# Patient Record
Sex: FEMALE | Race: WHITE | NOT HISPANIC OR LATINO | Employment: FULL TIME | URBAN - METROPOLITAN AREA
[De-identification: names, ages, dates, MRNs, and addresses within clinical notes are randomized per-mention and may not be internally consistent; named-entity substitution may affect disease eponyms.]

---

## 2018-10-10 PROBLEM — J30.89 ENVIRONMENTAL AND SEASONAL ALLERGIES: Status: ACTIVE | Noted: 2018-10-10

## 2018-10-10 PROBLEM — J45.909 ASTHMA: Status: ACTIVE | Noted: 2018-10-10

## 2019-08-07 PROBLEM — F32.9 REACTIVE DEPRESSION: Status: ACTIVE | Noted: 2019-08-07

## 2019-08-07 PROBLEM — I10 ESSENTIAL HYPERTENSION: Status: ACTIVE | Noted: 2019-08-07

## 2019-08-07 PROBLEM — F41.9 ANXIETY: Status: ACTIVE | Noted: 2019-08-07

## 2020-05-05 DIAGNOSIS — Z01.84 ANTIBODY RESPONSE EXAMINATION: ICD-10-CM

## 2020-06-17 PROBLEM — E78.00 ELEVATED CHOLESTEROL: Status: ACTIVE | Noted: 2020-06-17

## 2021-12-07 ENCOUNTER — OFFICE VISIT (OUTPATIENT)
Dept: NEUROLOGY | Facility: CLINIC | Age: 56
End: 2021-12-07
Payer: COMMERCIAL

## 2021-12-07 VITALS
SYSTOLIC BLOOD PRESSURE: 130 MMHG | HEART RATE: 84 BPM | DIASTOLIC BLOOD PRESSURE: 82 MMHG | HEIGHT: 64 IN | WEIGHT: 147.5 LBS | BODY MASS INDEX: 25.18 KG/M2 | RESPIRATION RATE: 16 BRPM

## 2021-12-07 DIAGNOSIS — G43.109 MIGRAINE WITH AURA AND WITHOUT STATUS MIGRAINOSUS, NOT INTRACTABLE: Primary | ICD-10-CM

## 2021-12-07 PROCEDURE — 99999 PR PBB SHADOW E&M-EST. PATIENT-LVL III: ICD-10-PCS | Mod: PBBFAC,,, | Performed by: PSYCHIATRY & NEUROLOGY

## 2021-12-07 PROCEDURE — 99999 PR PBB SHADOW E&M-EST. PATIENT-LVL III: CPT | Mod: PBBFAC,,, | Performed by: PSYCHIATRY & NEUROLOGY

## 2021-12-07 PROCEDURE — 99203 PR OFFICE/OUTPT VISIT, NEW, LEVL III, 30-44 MIN: ICD-10-PCS | Mod: S$GLB,,, | Performed by: PSYCHIATRY & NEUROLOGY

## 2021-12-07 PROCEDURE — 99203 OFFICE O/P NEW LOW 30 MIN: CPT | Mod: S$GLB,,, | Performed by: PSYCHIATRY & NEUROLOGY

## 2023-12-08 ENCOUNTER — HOSPITAL ENCOUNTER (INPATIENT)
Facility: HOSPITAL | Age: 58
LOS: 9 days | Discharge: HOME | DRG: 066 | End: 2023-12-17
Attending: EMERGENCY MEDICINE | Admitting: PSYCHIATRY & NEUROLOGY
Payer: COMMERCIAL

## 2023-12-08 DIAGNOSIS — I61.9 CEREBRAL BRAIN HEMORRHAGE: Primary | ICD-10-CM

## 2023-12-08 DIAGNOSIS — I10 ESSENTIAL HYPERTENSION: ICD-10-CM

## 2023-12-08 DIAGNOSIS — G44.1 OTHER VASCULAR HEADACHE: ICD-10-CM

## 2023-12-08 DIAGNOSIS — I60.9 NONTRAUMATIC SUBARACHNOID HEMORRHAGE: ICD-10-CM

## 2023-12-08 DIAGNOSIS — I60.9 SAH (SUBARACHNOID HEMORRHAGE): ICD-10-CM

## 2023-12-08 LAB
ABO + RH BLD: NORMAL
APTT PPP: 27 SEC (ref 21–32)
BASOPHILS # BLD AUTO: 0.03 K/UL (ref 0–0.2)
BASOPHILS NFR BLD: 0.4 % (ref 0–1.9)
BILIRUB UR QL STRIP: NEGATIVE
BLD GP AB SCN CELLS X3 SERPL QL: NORMAL
CHOLEST SERPL-MCNC: 181 MG/DL (ref 120–199)
CHOLEST/HDLC SERPL: 3.9 {RATIO} (ref 2–5)
CLARITY UR REFRACT.AUTO: CLEAR
COLOR UR AUTO: COLORLESS
DIFFERENTIAL METHOD: ABNORMAL
EOSINOPHIL # BLD AUTO: 0 K/UL (ref 0–0.5)
EOSINOPHIL NFR BLD: 0 % (ref 0–8)
ERYTHROCYTE [DISTWIDTH] IN BLOOD BY AUTOMATED COUNT: 12.8 % (ref 11.5–14.5)
ESTIMATED AVG GLUCOSE: 108 MG/DL (ref 68–131)
GLUCOSE UR QL STRIP: NEGATIVE
HBA1C MFR BLD: 5.4 % (ref 4–5.6)
HCT VFR BLD AUTO: 33.9 % (ref 37–48.5)
HDLC SERPL-MCNC: 46 MG/DL (ref 40–75)
HDLC SERPL: 25.4 % (ref 20–50)
HGB BLD-MCNC: 12.1 G/DL (ref 12–16)
HGB UR QL STRIP: NEGATIVE
IMM GRANULOCYTES # BLD AUTO: 0.02 K/UL (ref 0–0.04)
IMM GRANULOCYTES NFR BLD AUTO: 0.3 % (ref 0–0.5)
INR PPP: 1 (ref 0.8–1.2)
KETONES UR QL STRIP: ABNORMAL
LDLC SERPL CALC-MCNC: 128.4 MG/DL (ref 63–159)
LEUKOCYTE ESTERASE UR QL STRIP: NEGATIVE
LYMPHOCYTES # BLD AUTO: 0.7 K/UL (ref 1–4.8)
LYMPHOCYTES NFR BLD: 9.3 % (ref 18–48)
MCH RBC QN AUTO: 31.1 PG (ref 27–31)
MCHC RBC AUTO-ENTMCNC: 35.7 G/DL (ref 32–36)
MCV RBC AUTO: 87 FL (ref 82–98)
MONOCYTES # BLD AUTO: 0.3 K/UL (ref 0.3–1)
MONOCYTES NFR BLD: 3.8 % (ref 4–15)
NEUTROPHILS # BLD AUTO: 6.3 K/UL (ref 1.8–7.7)
NEUTROPHILS NFR BLD: 86.2 % (ref 38–73)
NITRITE UR QL STRIP: NEGATIVE
NONHDLC SERPL-MCNC: 135 MG/DL
NRBC BLD-RTO: 0 /100 WBC
PH UR STRIP: 8 [PH] (ref 5–8)
PLATELET # BLD AUTO: 191 K/UL (ref 150–450)
PMV BLD AUTO: 8.9 FL (ref 9.2–12.9)
POCT GLUCOSE: 105 MG/DL (ref 70–110)
PROT UR QL STRIP: NEGATIVE
PROTHROMBIN TIME: 10.8 SEC (ref 9–12.5)
RBC # BLD AUTO: 3.89 M/UL (ref 4–5.4)
SP GR UR STRIP: >1.03 (ref 1–1.03)
SPECIMEN OUTDATE: NORMAL
T4 FREE SERPL-MCNC: 0.91 NG/DL (ref 0.71–1.51)
TRIGL SERPL-MCNC: 33 MG/DL (ref 30–150)
TSH SERPL DL<=0.005 MIU/L-ACNC: 0.29 UIU/ML (ref 0.4–4)
URN SPEC COLLECT METH UR: ABNORMAL
WBC # BLD AUTO: 7.35 K/UL (ref 3.9–12.7)

## 2023-12-08 PROCEDURE — 86901 BLOOD TYPING SEROLOGIC RH(D): CPT

## 2023-12-08 PROCEDURE — 63600175 PHARM REV CODE 636 W HCPCS: Performed by: EMERGENCY MEDICINE

## 2023-12-08 PROCEDURE — 80061 LIPID PANEL: CPT

## 2023-12-08 PROCEDURE — 81003 URINALYSIS AUTO W/O SCOPE: CPT

## 2023-12-08 PROCEDURE — 99024 POSTOP FOLLOW-UP VISIT: CPT | Mod: ,,, | Performed by: PHYSICIAN ASSISTANT

## 2023-12-08 PROCEDURE — 85730 THROMBOPLASTIN TIME PARTIAL: CPT | Performed by: PHYSICIAN ASSISTANT

## 2023-12-08 PROCEDURE — 99291 CRITICAL CARE FIRST HOUR: CPT

## 2023-12-08 PROCEDURE — 96365 THER/PROPH/DIAG IV INF INIT: CPT

## 2023-12-08 PROCEDURE — 85610 PROTHROMBIN TIME: CPT | Performed by: PHYSICIAN ASSISTANT

## 2023-12-08 PROCEDURE — 20000000 HC ICU ROOM

## 2023-12-08 PROCEDURE — 99223 PR INITIAL HOSPITAL CARE,LEVL III: ICD-10-PCS | Mod: ,,, | Performed by: PSYCHIATRY & NEUROLOGY

## 2023-12-08 PROCEDURE — 99223 1ST HOSP IP/OBS HIGH 75: CPT | Mod: ,,, | Performed by: PSYCHIATRY & NEUROLOGY

## 2023-12-08 PROCEDURE — 99024 PR POST-OP FOLLOW-UP VISIT: ICD-10-PCS | Mod: ,,, | Performed by: PHYSICIAN ASSISTANT

## 2023-12-08 PROCEDURE — 85025 COMPLETE CBC W/AUTO DIFF WBC: CPT

## 2023-12-08 PROCEDURE — 25000003 PHARM REV CODE 250

## 2023-12-08 PROCEDURE — 94761 N-INVAS EAR/PLS OXIMETRY MLT: CPT

## 2023-12-08 PROCEDURE — 84443 ASSAY THYROID STIM HORMONE: CPT

## 2023-12-08 PROCEDURE — 83036 HEMOGLOBIN GLYCOSYLATED A1C: CPT

## 2023-12-08 PROCEDURE — 84439 ASSAY OF FREE THYROXINE: CPT

## 2023-12-08 PROCEDURE — 25000003 PHARM REV CODE 250: Performed by: PSYCHIATRY & NEUROLOGY

## 2023-12-08 PROCEDURE — 99291 PR CRITICAL CARE, E/M 30-74 MINUTES: ICD-10-PCS | Mod: ,,, | Performed by: PSYCHIATRY & NEUROLOGY

## 2023-12-08 PROCEDURE — 63600175 PHARM REV CODE 636 W HCPCS

## 2023-12-08 PROCEDURE — 99291 CRITICAL CARE FIRST HOUR: CPT | Mod: ,,, | Performed by: PSYCHIATRY & NEUROLOGY

## 2023-12-08 PROCEDURE — 25500020 PHARM REV CODE 255: Performed by: EMERGENCY MEDICINE

## 2023-12-08 RX ORDER — AMLODIPINE BESYLATE 5 MG/1
5 TABLET ORAL DAILY
Status: DISCONTINUED | OUTPATIENT
Start: 2023-12-09 | End: 2023-12-14

## 2023-12-08 RX ORDER — AMLODIPINE BESYLATE 5 MG/1
5 TABLET ORAL DAILY
Status: ON HOLD | COMMUNITY
End: 2023-12-14 | Stop reason: HOSPADM

## 2023-12-08 RX ORDER — AMOXICILLIN 250 MG
1 CAPSULE ORAL 2 TIMES DAILY
Status: DISCONTINUED | OUTPATIENT
Start: 2023-12-08 | End: 2023-12-08

## 2023-12-08 RX ORDER — LANOLIN ALCOHOL/MO/W.PET/CERES
800 CREAM (GRAM) TOPICAL
Status: DISCONTINUED | OUTPATIENT
Start: 2023-12-08 | End: 2023-12-15

## 2023-12-08 RX ORDER — ESTRADIOL 0.1 MG/D
1 PATCH TRANSDERMAL
COMMUNITY
End: 2023-12-08 | Stop reason: CLARIF

## 2023-12-08 RX ORDER — SODIUM,POTASSIUM PHOSPHATES 280-250MG
2 POWDER IN PACKET (EA) ORAL
Status: DISCONTINUED | OUTPATIENT
Start: 2023-12-08 | End: 2023-12-13

## 2023-12-08 RX ORDER — NIMODIPINE 30 MG/1
60 CAPSULE, LIQUID FILLED ORAL EVERY 4 HOURS
Status: DISCONTINUED | OUTPATIENT
Start: 2023-12-08 | End: 2023-12-12

## 2023-12-08 RX ORDER — LABETALOL HCL 20 MG/4 ML
10 SYRINGE (ML) INTRAVENOUS
Status: DISCONTINUED | OUTPATIENT
Start: 2023-12-08 | End: 2023-12-17 | Stop reason: HOSPADM

## 2023-12-08 RX ORDER — MAGNESIUM SULFATE HEPTAHYDRATE 40 MG/ML
2 INJECTION, SOLUTION INTRAVENOUS ONCE
Status: COMPLETED | OUTPATIENT
Start: 2023-12-08 | End: 2023-12-09

## 2023-12-08 RX ORDER — GABAPENTIN 300 MG/1
300 CAPSULE ORAL 3 TIMES DAILY
Status: DISCONTINUED | OUTPATIENT
Start: 2023-12-09 | End: 2023-12-15

## 2023-12-08 RX ORDER — OXYCODONE HYDROCHLORIDE 5 MG/1
5 TABLET ORAL EVERY 4 HOURS PRN
Status: DISCONTINUED | OUTPATIENT
Start: 2023-12-08 | End: 2023-12-17 | Stop reason: HOSPADM

## 2023-12-08 RX ORDER — NICARDIPINE HYDROCHLORIDE 0.2 MG/ML
0-15 INJECTION INTRAVENOUS CONTINUOUS
Status: DISCONTINUED | OUTPATIENT
Start: 2023-12-08 | End: 2023-12-11

## 2023-12-08 RX ORDER — NICARDIPINE HYDROCHLORIDE 0.2 MG/ML
INJECTION INTRAVENOUS
Status: COMPLETED
Start: 2023-12-08 | End: 2023-12-08

## 2023-12-08 RX ORDER — ACETAMINOPHEN 325 MG/1
650 TABLET ORAL EVERY 4 HOURS PRN
Status: DISCONTINUED | OUTPATIENT
Start: 2023-12-08 | End: 2023-12-14

## 2023-12-08 RX ORDER — METOCLOPRAMIDE HYDROCHLORIDE 5 MG/ML
10 INJECTION INTRAMUSCULAR; INTRAVENOUS ONCE
Status: COMPLETED | OUTPATIENT
Start: 2023-12-08 | End: 2023-12-08

## 2023-12-08 RX ORDER — ONDANSETRON 8 MG/1
8 TABLET, ORALLY DISINTEGRATING ORAL EVERY 8 HOURS PRN
Status: DISCONTINUED | OUTPATIENT
Start: 2023-12-08 | End: 2023-12-17 | Stop reason: HOSPADM

## 2023-12-08 RX ORDER — SODIUM CHLORIDE 0.9 % (FLUSH) 0.9 %
10 SYRINGE (ML) INJECTION
Status: DISCONTINUED | OUTPATIENT
Start: 2023-12-08 | End: 2023-12-08

## 2023-12-08 RX ORDER — METHOCARBAMOL 500 MG/1
500 TABLET, FILM COATED ORAL 4 TIMES DAILY
Status: DISCONTINUED | OUTPATIENT
Start: 2023-12-09 | End: 2023-12-15

## 2023-12-08 RX ORDER — PROGESTERONE 200 MG/1
200 CAPSULE ORAL DAILY
COMMUNITY
End: 2023-12-08 | Stop reason: CLARIF

## 2023-12-08 RX ORDER — TALC
6 POWDER (GRAM) TOPICAL NIGHTLY PRN
Status: DISCONTINUED | OUTPATIENT
Start: 2023-12-08 | End: 2023-12-17 | Stop reason: HOSPADM

## 2023-12-08 RX ORDER — LEVETIRACETAM 500 MG/1
500 TABLET ORAL 2 TIMES DAILY
Status: DISCONTINUED | OUTPATIENT
Start: 2023-12-08 | End: 2023-12-12

## 2023-12-08 RX ORDER — HYDROMORPHONE HYDROCHLORIDE 1 MG/ML
0.5 INJECTION, SOLUTION INTRAMUSCULAR; INTRAVENOUS; SUBCUTANEOUS
Status: COMPLETED | OUTPATIENT
Start: 2023-12-08 | End: 2023-12-08

## 2023-12-08 RX ADMIN — SENNOSIDES AND DOCUSATE SODIUM 1 TABLET: 8.6; 5 TABLET ORAL at 09:12

## 2023-12-08 RX ADMIN — NIMODIPINE 60 MG: 30 CAPSULE, LIQUID FILLED ORAL at 06:12

## 2023-12-08 RX ADMIN — NIMODIPINE 60 MG: 30 CAPSULE, LIQUID FILLED ORAL at 02:12

## 2023-12-08 RX ADMIN — METOCLOPRAMIDE 10 MG: 5 INJECTION, SOLUTION INTRAMUSCULAR; INTRAVENOUS at 11:12

## 2023-12-08 RX ADMIN — LEVETIRACETAM 500 MG: 500 TABLET, FILM COATED ORAL at 09:12

## 2023-12-08 RX ADMIN — IOHEXOL 75 ML: 350 INJECTION, SOLUTION INTRAVENOUS at 11:12

## 2023-12-08 RX ADMIN — HYDROMORPHONE HYDROCHLORIDE 0.5 MG: 1 INJECTION, SOLUTION INTRAMUSCULAR; INTRAVENOUS; SUBCUTANEOUS at 12:12

## 2023-12-08 RX ADMIN — NIMODIPINE 60 MG: 30 CAPSULE, LIQUID FILLED ORAL at 09:12

## 2023-12-08 RX ADMIN — OXYCODONE HYDROCHLORIDE 5 MG: 5 TABLET ORAL at 07:12

## 2023-12-08 RX ADMIN — NICARDIPINE HYDROCHLORIDE 5 MG/HR: 0.2 INJECTION, SOLUTION INTRAVENOUS at 11:12

## 2023-12-08 RX ADMIN — NICARDIPINE HYDROCHLORIDE 5 MG/HR: 0.2 INJECTION INTRAVENOUS at 11:12

## 2023-12-08 NOTE — H&P
[facility] - Emergency Dept  Neurocritical Care  History & Physical    Admit Date: 12/8/2023  Service Date: 12/08/2023  Length of Stay: 0    Subjective:     Chief Complaint: Subarachnoid hemorrhage    History of Present Illness: 59 y/o F w/ PMH migraines, anxiety, and HTN who [place] [facility] [place]. Patient states that she was working out this morning when she developed a sudden, severe headache, described as among the worst of her life, which prompted her to come to the [facility] [place]. According to patient, the headache's improved considerably after she took Excedrin and reduced her activity; if she sits with her eyes closed the pain is 2/10 in intensity and increases to 9/10 with movement. Upon [place] [facility] patient's BP was measured into the 200s systolics. The patient states that she had a BP into 180s at her last visit with her general practitioner but was not prescribed medication because her pressures were in normal at home. 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 The patient will be admitted to [facility] for further neuro-monitoring and higher level of care.      Past Medical History:   Diagnosis Date    Asthma     Lung collapse     Pneumothorax     spontaneous     Past Surgical History:   Procedure Laterality Date    APPENDECTOMY      bilateral groin hernia repair        Current Facility-Administered Medications on File Prior to Encounter   Medication Dose Route Frequency Provider Last Rate Last Admin    [COMPLETED] niCARdipine 40 mg/200 mL (0.2 mg/mL) infusion  0-15 mg/hr Intravenous ED 1 Time Jose Doss MD 12.5 mL/hr at 12/08/23 0940 2.5 mg/hr at 12/08/23 0940    [COMPLETED] ondansetron injection 4 mg  4 mg Intravenous ED 1 Time Jose Doss MD   4 mg at 12/08/23 0937    [DISCONTINUED] niCARdipine 40 mg/200 mL (0.2 mg/mL) infusion  0-15 mg/hr  Intravenous Continuous Jose Doss MD        [DISCONTINUED] ondansetron 4 mg/2 mL injection              Current Outpatient Medications on File Prior to Encounter   Medication Sig Dispense Refill    NON FORMULARY MEDICATION Bi-Est (50/50)/Prog/Test 4mg/100mg/1mg/ml Cream - APPLY 4 CLICKS TOPICALLY EVERY DAY AT BEDTIME AS DIRECTED      [DISCONTINUED] acyclovir (ZOVIRAX) 400 MG tablet Take 1 tablet (400 mg total) by mouth 3 (three) times daily. 271 tablet 0    [DISCONTINUED] APPLE CIDER VINEGAR ORAL Take by mouth.      [DISCONTINUED] cetirizine (ZYRTEC) 10 MG tablet Take 10 mg by mouth once daily.      [DISCONTINUED] dihydroergotamine (MIGRANAL) 0.5 mg/pump act. (4 mg/mL) nasal spray 1 spray by Nasal route as needed for Migraine. Use in one nostril as directed.  No more than 4 sprays in one hour 1 mL 0    [DISCONTINUED] fluticasone (FLONASE) 50 mcg/actuation nasal spray 1 spray by Each Nare route once daily.      [DISCONTINUED] multivit-min/ferrous fumarate (MULTI VITAMIN ORAL) Take by mouth.      [DISCONTINUED] topiramate (TOPAMAX) 50 MG tablet TAKE 1 TABLET BY MOUTH TWICE A  tablet 3      Allergies: Erythromycin and Pcn [penicillins]    N/A  Family History   Problem Relation Age of Onset    Cataracts Mother     Diabetes Mother     Hypertension Mother     Progressive Supranuclear Palsy Mother     Stroke Mother     Pulmonary embolism Mother     Cataracts Father     Heart disease Father     Hypertension Father     Asthma Father     Diabetes Father     Bipolar disorder Sister     Chronic fatigue Sister     Hyperlipidemia Sister      Social History     Tobacco Use    Smoking status: Never    Smokeless tobacco: Never   Substance Use Topics    Alcohol use: No    Drug use: No     Review of Systems   Constitutional:  Negative for fatigue and fever.   HENT: Negative.     Eyes:  Positive for photophobia.   Respiratory: Negative.     Cardiovascular:  Negative for chest pain and palpitations.   Gastrointestinal:  Negative.    Endocrine: Negative.    Genitourinary: Negative.    Musculoskeletal: Negative.  Negative for neck stiffness.   Skin: Negative.    Neurological:  Positive for headaches. Negative for dizziness, seizures, syncope, facial asymmetry, speech difficulty, weakness, light-headedness and numbness.   Psychiatric/Behavioral: Negative.       Objective:     Vitals:    Temp: 97.9 °F (36.6 °C)  Pulse: 96  BP: (!) 140/63  MAP (mmHg): 91  Resp: 12  SpO2: 96 %    Temp  Min: 97.9 °F (36.6 °C)  Max: 98.1 °F (36.7 °C)  Pulse  Min: 57  Max: 106  BP  Min: 105/58  Max: 210/105  MAP (mmHg)  Min: 77  Max: 150  Resp  Min: 10  Max: 20  SpO2  Min: 93 %  Max: 100 %    No intake/output data recorded.            Physical Exam  Constitutional:       Appearance: She is not toxic-appearing.   HENT:      Head: Normocephalic.      Nose: Nose normal.      Mouth/Throat:      Mouth: Mucous membranes are moist.   Eyes:      Pupils: Pupils are equal, round, and reactive to light.   Cardiovascular:      Rate and Rhythm: Regular rhythm. Tachycardia present.      Heart sounds: No murmur heard.  Pulmonary:      Effort: Pulmonary effort is normal.      Breath sounds: No stridor. No wheezing.   Abdominal:      General: Abdomen is flat. There is no distension.      Palpations: Abdomen is soft.      Tenderness: There is no abdominal tenderness.   Musculoskeletal:         General: Normal range of motion.      Cervical back: Normal range of motion.      Right lower leg: No edema.      Left lower leg: No edema.   Skin:     General: Skin is warm.   Neurological:      Mental Status: She is alert.      Comments: GCS 15  PEERL  5/5 BL UE and LE  EOMI  Sensation in tact  Cranial nerves in tact   Psychiatric:         Mood and Affect: Mood normal.         Behavior: Behavior normal.         Thought Content: Thought content normal.         Judgment: Judgment normal.              Today I personally reviewed pertinent medications, lines/drains/airways, imaging,  cardiology results, laboratory results, microbiology results,         Assessment/Plan:     Neuro  * Subarachnoid hemorrhage  59 y/o F w/ PMH migraines, anxiety, and HTN who presented to [facility] with SAH. Patient states that she was working out this morning when she developed a sudden, severe headache, described as among the worst of her life, which prompted her to come to the [facility] ED. According to patient, the headache's improved considerably after she took Excedrin and reduced her activity; if she sits with her eyes closed the pain is 2/10 in intensity and increases to 9/10 with movement. Upon presentation to [facility], patient's BP was measured into the 200s systolics. The patient states that she had a BP into 180s at her last visit with her general practitioner but was not prescribed medication because her pressures were in normal at home. 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 The patient will be admitted to [facility] for further neuro-monitoring and higher level of care.    Plan:  -Admit to [facility]  [facility]  -SBP <140  -Daily TCDs  -Follow up CT 4PM today  -Daily CBC, CMP, Mg, Phos, Lipids  -Nimodipine 60 mg q4hrs   -Keppra 500 mg BID 7 days  -Q1 neurochecks   -Pain control  -Consider echocardiogram  -Possible cerebral angiogram w/ IR per NSGY    Cardiac/Vascular  Elevated cholesterol  Hx of elevated lipoproteins   Will get new lipid panel     Essential hypertension  Previously on Losartan-HCTZ for HTN. Has not been on medication for extended period of time due to reported normal home pressures.  Will keep SBP <140 post SAH. Currently on Cardene drip. Will add Labetolol and Hydralazine PRNs.           The patient is being Prophylaxed for:  Venous Thromboembolism with: None  Stress Ulcer with: None  Ventilator Pneumonia with: not applicable    N/A  Family  History   Problem Relation Age of Onset    Cataracts Mother     Diabetes Mother     Hypertension Mother     Progressive Supranuclear Palsy Mother     Stroke Mother     Pulmonary embolism Mother     Cataracts Father     Heart disease Father     Hypertension Father     Asthma Father     Diabetes Father     Bipolar disorder Sister     Chronic fatigue Sister     Hyperlipidemia Sister        Activity Orders            Progressive Mobility Protocol (mobilize patient to their highest level of functioning at least twice daily) starting at 12/08 2000    Turn patient starting at 12/08 1400    Elevate HOB starting at 12/08 1324    Diet NPO: NPO starting at 12/08 1324          Full Code    Car Valentine MD  Neurocritical Care  [facility] - Emergency Dept

## 2023-12-08 NOTE — HPI
"Laura Newell is a 58 y.o. female with a significant medical history of asthma who presents to the hospital as a transfer from [facility] for evaluation of SAH.  The patient was in her usual state of health and at the gym.  She was lifting weights when she had acute onset headache.  She took Excedrin Migraine with some relief but presented to the [facility] ED about an hour after the onset of the HA.  On arrival to the OSH ED the patient's BP was 200s/100s. She denied history of HTN.  A CTH was obtained and revealed a SAH.  The patient was transferred to [facility] for higher level of care.    Currently the patient has a HA and photophobia.  She states "keeping still and not talking or moving much" helps decrease the HA though it remains.  She also states it improves w/her eyes closed.      "

## 2023-12-08 NOTE — SUBJECTIVE & OBJECTIVE
Past Medical History:   Diagnosis Date    Asthma     Lung collapse     Pneumothorax     spontaneous     Past Surgical History:   Procedure Laterality Date    APPENDECTOMY      bilateral groin hernia repair       Family History   Problem Relation Age of Onset    Cataracts Mother     Diabetes Mother     Hypertension Mother     Progressive Supranuclear Palsy Mother     Stroke Mother     Pulmonary embolism Mother     Cataracts Father     Heart disease Father     Hypertension Father     Asthma Father     Diabetes Father     Bipolar disorder Sister     Chronic fatigue Sister     Hyperlipidemia Sister      Social History     Tobacco Use    Smoking status: Never    Smokeless tobacco: Never   Substance Use Topics    Alcohol use: No    Drug use: No     Review of patient's allergies indicates:   Allergen Reactions    Erythromycin     Pcn [penicillins]        Medications: I have reviewed the current medication administration record.    Current Outpatient Medications   Medication Instructions    NON FORMULARY MEDICATION Bi-Est (50/50)/Prog/Test 4mg/100mg/1mg/ml Cream - APPLY 4 CLICKS TOPICALLY EVERY DAY AT BEDTIME AS DIRECTED       Review of Systems   Eyes:  Positive for photophobia. Negative for visual disturbance.   Neurological:  Positive for headaches. Negative for facial asymmetry, speech difficulty, weakness and numbness.     Objective:     Vital Signs (Most Recent):  Temp: 97.9 °F (36.6 °C) (12/08/23 1157)  Pulse: 106 (12/08/23 1215)  Resp: 20 (12/08/23 1244)  BP: (!) 178/84 (12/08/23 1231)  SpO2: 100 % (12/08/23 1231)    Vital Signs Range (Last 24H):  Temp:  [97.9 °F (36.6 °C)-98.1 °F (36.7 °C)]   Pulse:  []   Resp:  [10-20]   BP: (105-210)/()   SpO2:  [93 %-100 %]        Physical Exam  Vitals and nursing note reviewed.   Constitutional:       Appearance: She is ill-appearing.   HENT:      Mouth/Throat:      Mouth: Mucous membranes are moist.   Eyes:      General: No scleral icterus.        Right eye:  No discharge.         Left eye: No discharge.      Extraocular Movements: Extraocular movements intact.      Conjunctiva/sclera: Conjunctivae normal.   Cardiovascular:      Rate and Rhythm: Normal rate.   Pulmonary:      Effort: Pulmonary effort is normal. No respiratory distress.   Abdominal:      General: There is no distension.   Musculoskeletal:      Cervical back: Normal range of motion and neck supple.      Right lower leg: No edema.      Left lower leg: No edema.   Skin:     General: Skin is warm and dry.   Neurological:      Mental Status: She is alert and oriented to person, place, and time.      Sensory: No sensory deficit.      Motor: No weakness.   Psychiatric:         Mood and Affect: Mood normal.         Behavior: Behavior normal.              Neurological Exam:   LOC: alert  Attention Span: Good   Language: No aphasia  Articulation: No dysarthria  Orientation: Person, Place, Time   EOM (CN III, IV, VI): Full/intact  Facial Sensation (CN V): Normal  Facial Movement (CN VII): Symmetric facial expression    Motor: Arm left  Normal 5/5  Leg left  Normal 5/5  Arm right  Normal 5/5  Leg right Normal 5/5  Sensation: intact to light touch  Tone: Normal tone throughout      Laboratory:  CMP:   Recent Labs   Lab 12/08/23  0826   CALCIUM 9.4   ALBUMIN 4.7   PROT 7.6      K 4.3   CO2 25      BUN 15   CREATININE 0.88   ALKPHOS 97   ALT 23   AST 33   BILITOT 0.9     CBC:   Recent Labs   Lab 12/08/23  0826   WBC 4.80   RBC 4.44   HGB 13.8   HCT 41.0      MCV 92   MCH 31.1*   MCHC 33.7       Diagnostic Results:      Brain imaging:  CT pending    [facility] 12/8/2023 @0851 Impression: Small hemorrhage along the anterior and right lateral aspects of the katiuska, 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  Patchy hypodense areas involving the bilateral subcortical cerebral white matter, nonspecific and possibly reflecting chronic small vessel ischemic change.    Vessel Imaging:  CTA Head and Neck  12/8/2023 Impression: Stable subarachnoid hemorrhage.  No hydrocephalus.  Nonspecific presumed chronic white matter changes again identified.  No intracranial aneurysm or AVM is identified.    Cardiac Evaluation:   EKG 12/8/2023 NSR possible LAE

## 2023-12-08 NOTE — SUBJECTIVE & OBJECTIVE
Past Medical History:   Diagnosis Date    Asthma     Lung collapse     Pneumothorax     spontaneous     Past Surgical History:   Procedure Laterality Date    APPENDECTOMY      bilateral groin hernia repair        Current Facility-Administered Medications on File Prior to Encounter   Medication Dose Route Frequency Provider Last Rate Last Admin    [COMPLETED] niCARdipine 40 mg/200 mL (0.2 mg/mL) infusion  0-15 mg/hr Intravenous ED 1 Time Jose Doss MD 12.5 mL/hr at 12/08/23 0940 2.5 mg/hr at 12/08/23 0940    [COMPLETED] ondansetron injection 4 mg  4 mg Intravenous ED 1 Time Jose Doss MD   4 mg at 12/08/23 0937    [DISCONTINUED] niCARdipine 40 mg/200 mL (0.2 mg/mL) infusion  0-15 mg/hr Intravenous Continuous Jose Doss MD        [DISCONTINUED] ondansetron 4 mg/2 mL injection              Current Outpatient Medications on File Prior to Encounter   Medication Sig Dispense Refill    NON FORMULARY MEDICATION Bi-Est (50/50)/Prog/Test 4mg/100mg/1mg/ml Cream - APPLY 4 CLICKS TOPICALLY EVERY DAY AT BEDTIME AS DIRECTED      [DISCONTINUED] acyclovir (ZOVIRAX) 400 MG tablet Take 1 tablet (400 mg total) by mouth 3 (three) times daily. 271 tablet 0    [DISCONTINUED] APPLE CIDER VINEGAR ORAL Take by mouth.      [DISCONTINUED] cetirizine (ZYRTEC) 10 MG tablet Take 10 mg by mouth once daily.      [DISCONTINUED] dihydroergotamine (MIGRANAL) 0.5 mg/pump act. (4 mg/mL) nasal spray 1 spray by Nasal route as needed for Migraine. Use in one nostril as directed.  No more than 4 sprays in one hour 1 mL 0    [DISCONTINUED] fluticasone (FLONASE) 50 mcg/actuation nasal spray 1 spray by Each Nare route once daily.      [DISCONTINUED] multivit-min/ferrous fumarate (MULTI VITAMIN ORAL) Take by mouth.      [DISCONTINUED] topiramate (TOPAMAX) 50 MG tablet TAKE 1 TABLET BY MOUTH TWICE A  tablet 3      Allergies: Erythromycin and Pcn [penicillins]    N/A  Family History   Problem Relation Age of Onset    Cataracts  Mother     Diabetes Mother     Hypertension Mother     Progressive Supranuclear Palsy Mother     Stroke Mother     Pulmonary embolism Mother     Cataracts Father     Heart disease Father     Hypertension Father     Asthma Father     Diabetes Father     Bipolar disorder Sister     Chronic fatigue Sister     Hyperlipidemia Sister      Social History     Tobacco Use    Smoking status: Never    Smokeless tobacco: Never   Substance Use Topics    Alcohol use: No    Drug use: No     Review of Systems   Constitutional:  Negative for fatigue and fever.   HENT: Negative.     Eyes:  Positive for photophobia.   Respiratory: Negative.     Cardiovascular:  Negative for chest pain and palpitations.   Gastrointestinal: Negative.    Endocrine: Negative.    Genitourinary: Negative.    Musculoskeletal: Negative.  Negative for neck stiffness.   Skin: Negative.    Neurological:  Positive for headaches. Negative for dizziness, seizures, syncope, facial asymmetry, speech difficulty, weakness, light-headedness and numbness.   Psychiatric/Behavioral: Negative.       Objective:     Vitals:    Temp: 97.9 °F (36.6 °C)  Pulse: 96  BP: (!) 140/63  MAP (mmHg): 91  Resp: 12  SpO2: 96 %    Temp  Min: 97.9 °F (36.6 °C)  Max: 98.1 °F (36.7 °C)  Pulse  Min: 57  Max: 106  BP  Min: 105/58  Max: 210/105  MAP (mmHg)  Min: 77  Max: 150  Resp  Min: 10  Max: 20  SpO2  Min: 93 %  Max: 100 %    No intake/output data recorded.            Physical Exam  Constitutional:       Appearance: She is not toxic-appearing.   HENT:      Head: Normocephalic.      Nose: Nose normal.      Mouth/Throat:      Mouth: Mucous membranes are moist.   Eyes:      Pupils: Pupils are equal, round, and reactive to light.   Cardiovascular:      Rate and Rhythm: Regular rhythm. Tachycardia present.      Heart sounds: No murmur heard.  Pulmonary:      Effort: Pulmonary effort is normal.      Breath sounds: No stridor. No wheezing.   Abdominal:      General: Abdomen is flat. There is no  distension.      Palpations: Abdomen is soft.      Tenderness: There is no abdominal tenderness.   Musculoskeletal:         General: Normal range of motion.      Cervical back: Normal range of motion.      Right lower leg: No edema.      Left lower leg: No edema.   Skin:     General: Skin is warm.   Neurological:      Mental Status: She is alert.      Comments: GCS 15  PEERL  5/5 BL UE and LE  EOMI  Sensation in tact  Cranial nerves in tact   Psychiatric:         Mood and Affect: Mood normal.         Behavior: Behavior normal.         Thought Content: Thought content normal.         Judgment: Judgment normal.              Today I personally reviewed pertinent medications, lines/drains/airways, imaging, cardiology results, laboratory results, microbiology results,

## 2023-12-08 NOTE — HPI
59 yo female with history of HTN transferred from [facility] for NSGY evaluation of subarachnoid hemorrhage. She presented to the emergency room with complaint of acute onset headache while working out this am. States that she took Excedrin migraine which helped a little bit.  States that she has never had a headache like this before.  Patient's systolic blood pressures greater than 200 upon arrival.  CTH at OSH shows small hemorrhage along the anterior and right lateral aspects of the katiuska, no IVH. Pt is not on blood thinners.

## 2023-12-08 NOTE — CONSULTS
[facility] - Emergency Dept  Vascular Neurology  [facility]  Consult Note    Inpatient consult to Vascular Neurology  Consult performed by: Pricila Ramírez DNP, NP  Consult ordered by: Amol Chahal MD  Reason for consult: transfer, SAH      Inpatient consult to Vascular (Stroke) Neurology  Consult performed by: Pricila Ramírez DNP, NP  Consult ordered by: Car Singh MD        Assessment/Plan:     Essential hypertension  -Stroke risk factor.  Noted in the patient's record as of 8/2019.  Patient not currently prescribed medications.  -Initial SBP 200s.  -Currently on Cardene gtt  -Monitor for need to d/c on home BP meds    Subarachnoid hemorrhage  Laura Newell is a 58 y.o. female with a significant medical history of asthma who presents to the hospital as a [place] evaluation of SAH.  She was hypertensive on presentation to the OSH ED, 200s/100s.  A CTH showed SAH.  Cardene gtt was started.  The patient was transferred for higher level of care.      Antithrombotics for secondary stroke prevention: Antiplatelets: None: Intracerebral Hemorrhage    Statins for secondary stroke prevention and hyperlipidemia, if present:   Statins: None: Reason: Not indicated in acute SAH though if LDL is >70 consider starting    Aggressive risk factor modification: None     Rehab efforts: The patient has been evaluated by a stroke team provider and the therapy needs have been fully considered based off the presenting complaints and exam findings. The following therapy evaluations are needed: PT evaluate and treat, OT evaluate and treat    Diagnostics ordered/pending: CT scan of head without contrast to asses brain parenchyma, HgbA1C to assess blood glucose levels, Lipid Profile to assess cholesterol levels, TTE to assess cardiac function/status , TSH to assess thyroid function, Other: DSA    VTE prophylaxis: Mechanical prophylaxis: Place SCDs  None: Reason for No  Pharmacological VTE Prophylaxis: SAH    BP parameters: SAH: SBP<140        Elevated cholesterol  -Stroke risk factor.  Noted in the patient's record as of 6/2020.  Patient not currently prescribed statin.  -Lipid panel is pending        STROKE DOCUMENTATION          NIH Scale:  Interval: baseline  1a. Level of Consciousness: 0-->Alert, keenly responsive  1b. LOC Questions: 0-->Answers both questions correctly  1c. LOC Commands: 0-->Performs both tasks correctly  2. Best Gaze: 0-->Normal  3. Visual: 0-->No visual loss  4. Facial Palsy: 0-->Normal symmetrical movements  5a. Motor Arm, Left: 0-->No drift, limb holds 90 (or 45) degrees for full 10 secs  5b. Motor Arm, Right: 0-->No drift, limb holds 90 (or 45) degrees for full 10 secs  6a. Motor Leg, Left: 0-->No drift, leg holds 30 degree position for full 5 secs  6b. Motor Leg, Right: 0-->No drift, leg holds 30 degree position for full 5 secs  7. Limb Ataxia: 0-->Absent  8. Sensory: 0-->Normal, no sensory loss  9. Best Language: 0-->No aphasia, normal  10. Dysarthria: 0-->Normal  11. Extinction and Inattention (formerly Neglect): 0-->No abnormality  Total (NIH Stroke Scale): 0    Modified Huerfano Score: 0  Zaleski Coma Scale:15   ABCD2 Score:    [id number] Score:   HAS -BLED Score:   ICH Score:0  Hunt & Bravo Classification:Grade I      Thrombolysis Candidate? No, CT findings (ICH, SAH, Large core infarct)     Delays to Thrombolysis?  Not Applicable    Interventional Revascularization Candidate?   Is the patient eligible for mechanical endovascular reperfusion (SHAWN)?   No, SAH    Delays to Thrombectomy? Not Applicable    Hemorrhagic change of an Ischemic Stroke: Does this patient have an ischemic stroke with hemorrhagic changes? No     Subjective:     History of Present Illness:  Laura Newell is a 58 y.o. female with a significant medical history of asthma who presents to the hospital as a transfer from [facility] for evaluation of SAH.  The  "patient was in her usual state of health and at the gym.  She was lifting weights when she had acute onset headache.  She took Excedrin Migraine with some relief but presented to the OSH ED about an hour after the onset of the HA.  On arrival to the OSH ED the patient's BP was 200s/100s. She denied history of HTN.  A CTH was obtained and revealed a SAH.  The patient was transferred to [facility] for higher level of care.    Currently the patient has a HA and photophobia.  She states "keeping still and not talking or moving much" helps decrease the HA though it remains.  She also states it improves w/her eyes closed.              Past Medical History:   Diagnosis Date    Asthma     Lung collapse     Pneumothorax     spontaneous     Past Surgical History:   Procedure Laterality Date    APPENDECTOMY      bilateral groin hernia repair       Family History   Problem Relation Age of Onset    Cataracts Mother     Diabetes Mother     Hypertension Mother     Progressive Supranuclear Palsy Mother     Stroke Mother     Pulmonary embolism Mother     Cataracts Father     Heart disease Father     Hypertension Father     Asthma Father     Diabetes Father     Bipolar disorder Sister     Chronic fatigue Sister     Hyperlipidemia Sister      Social History     Tobacco Use    Smoking status: Never    Smokeless tobacco: Never   Substance Use Topics    Alcohol use: No    Drug use: No     Review of patient's allergies indicates:   Allergen Reactions    Erythromycin     Pcn [penicillins]        Medications: I have reviewed the current medication administration record.    Current Outpatient Medications   Medication Instructions    NON FORMULARY MEDICATION Bi-Est (50/50)/Prog/Test 4mg/100mg/1mg/ml Cream - APPLY 4 CLICKS TOPICALLY EVERY DAY AT BEDTIME AS DIRECTED       Review of Systems   Eyes:  Positive for photophobia. Negative for visual disturbance.   Neurological:  Positive for headaches. Negative for facial asymmetry, speech " difficulty, weakness and numbness.     Objective:     Vital Signs (Most Recent):  Temp: 97.9 °F (36.6 °C) (12/08/23 1157)  Pulse: 106 (12/08/23 1215)  Resp: 20 (12/08/23 1244)  BP: (!) 178/84 (12/08/23 1231)  SpO2: 100 % (12/08/23 1231)    Vital Signs Range (Last 24H):  Temp:  [97.9 °F (36.6 °C)-98.1 °F (36.7 °C)]   Pulse:  []   Resp:  [10-20]   BP: (105-210)/()   SpO2:  [93 %-100 %]        Physical Exam  Vitals and nursing note reviewed.   Constitutional:       Appearance: She is ill-appearing.   HENT:      Mouth/Throat:      Mouth: Mucous membranes are moist.   Eyes:      General: No scleral icterus.        Right eye: No discharge.         Left eye: No discharge.      Extraocular Movements: Extraocular movements intact.      Conjunctiva/sclera: Conjunctivae normal.   Cardiovascular:      Rate and Rhythm: Normal rate.   Pulmonary:      Effort: Pulmonary effort is normal. No respiratory distress.   Abdominal:      General: There is no distension.   Musculoskeletal:      Cervical back: Normal range of motion and neck supple.      Right lower leg: No edema.      Left lower leg: No edema.   Skin:     General: Skin is warm and dry.   Neurological:      Mental Status: She is alert and oriented to person, place, and time.      Sensory: No sensory deficit.      Motor: No weakness.   Psychiatric:         Mood and Affect: Mood normal.         Behavior: Behavior normal.              Neurological Exam:   LOC: alert  Attention Span: Good   Language: No aphasia  Articulation: No dysarthria  Orientation: Person, Place, Time   EOM (CN III, IV, VI): Full/intact  Facial Sensation (CN V): Normal  Facial Movement (CN VII): Symmetric facial expression    Motor: Arm left  Normal 5/5  Leg left  Normal 5/5  Arm right  Normal 5/5  Leg right Normal 5/5  Sensation: intact to light touch  Tone: Normal tone throughout      Laboratory:  CMP:   Recent Labs   Lab 12/08/23  0826   CALCIUM 9.4   ALBUMIN 4.7   PROT 7.6      K 4.3    CO2 25      BUN 15   CREATININE 0.88   ALKPHOS 97   ALT 23   AST 33   BILITOT 0.9     CBC:   Recent Labs   Lab 12/08/23  0826   WBC 4.80   RBC 4.44   HGB 13.8   HCT 41.0      MCV 92   MCH 31.1*   MCHC 33.7       Diagnostic Results:      Brain imaging:  CTH pending    CTH 12/8/2023 @0851 Impression: Small hemorrhage along the anterior and right lateral aspects of the katiuska, 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  Patchy hypodense areas involving the bilateral subcortical cerebral white matter, nonspecific and possibly reflecting chronic small vessel ischemic change.    Vessel Imaging:  CTA Head and Neck 12/8/2023 Impression: Stable subarachnoid hemorrhage.  No hydrocephalus.  Nonspecific presumed chronic white matter changes again identified.  No intracranial aneurysm or AVM is identified.    Cardiac Evaluation:   EKG 12/8/2023 NSR possible AIDA Ramírez, LENCHO, NP  [facility]  Department of Vascular Neurology   [facility] - Emergency Dept

## 2023-12-08 NOTE — HPI
57 y/o F w/ PMH migraines, anxiety, and HTN who presented to [facility] with SAH. Patient states that she was working out this morning when she developed a sudden, severe headache, described as among the worst of her life, which prompted her to come to the [facility] ED. According to patient, the headache's improved considerably after she took Excedrin and reduced her activity; if she sits with her eyes closed the pain is 2/10 in intensity and increases to 9/10 with movement. Upon presentation to [facility], patient's BP was measured into the 200s systolics. The patient states that she had a BP into 180s at her last visit with her general practitioner but was not prescribed medication because her pressures were in normal at home. 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 The patient will be admitted to [facility] for further neuro-monitoring and higher level of care.

## 2023-12-08 NOTE — ED NOTES
"C/C: 58 y.o. female arrived to ED via EMS transfer from [place] for NSGY consult. Patient dx'd with "ruptured aneurysm at the katiuska" upon working out this morning. Patient endorses 5/10 HA when speaking or stimulated, but otherwise feels asymptomatic at rest. Denies numbness/tingling, unilateral weakness, N/V, or visual changes.     APPEARANCE: awake, alert, and appears to be in mild discomfort. Pain score 5/10. Pt placed on cont cardiac monitoring, auto BP cuff, and cont pulse ox. Bed in lowest and locked position with side rails up x2.    SKIN: warm, dry and intact. No visible breakdown or bruising noted to extremities.   MUSCULOSKELETAL: Head normocephalic, atraumatic. Patient moving all extremities spontaneously, no obvious swelling or deformities noted. Ambulates independently.  RESPIRATORY: Airway open and patent. Denies shortness of breath. Respirations even, unlabored, equal bilaterally on inspiration and expiration.   CARDIAC: Regular HR. Denies CP, 2+ DP pulses bilaterally; no peripheral edema.  ABDOMEN: S/ND/NT, Denies N/V/D. Pt denies abnormal BM.   : Pt voids spontaneously, denies dysuria, hematuria, urinary frequency, or incontinence  NEUROLOGIC: AAO x 4; speaking and following commands appropriately. Equal strength in all extremities; denies numbness/tingling. +bifocal use. No dysarthria or aphasia. +occipital HA  "

## 2023-12-08 NOTE — ED PROVIDER NOTES
Encounter Date: 12/8/2023       History     Chief Complaint   Patient presents with    Transfer     Arrives via EMS from [facility] needing neuro for a ruptured basilar tip aneurysm on cardene drip 5mg      HPI  58-year-old female with past medical history as noted below coming in as a transfer for subarachnoid hemorrhage concern for ruptured aneurism.  She was working out in her usual state of health when she developed sudden onset severe headache.  She presents to outside hospital which showed CT scan as per below.    She was started on nicardipine drip and transferred here for neurosurgery.    She is complaining of a 5/10 headache, and some nausea worse with movement.  No chest pain, shortness breath, abdominal pain.  No neuro complaints.    She is not on blood thinners, however she did take some Excedrin after the headache started.  She thinks it is Excedrin with Tylenol and caffeine but is unsure of the exact and gradients.    Review of patient's allergies indicates:   Allergen Reactions    Erythromycin     Pcn [penicillins]      Past Medical History:   Diagnosis Date    Asthma     Lung collapse     Pneumothorax     spontaneous     Past Surgical History:   Procedure Laterality Date    APPENDECTOMY      bilateral groin hernia repair       Family History   Problem Relation Age of Onset    Cataracts Mother     Diabetes Mother     Hypertension Mother     Progressive Supranuclear Palsy Mother     Stroke Mother     Pulmonary embolism Mother     Cataracts Father     Heart disease Father     Hypertension Father     Asthma Father     Diabetes Father     Bipolar disorder Sister     Chronic fatigue Sister     Hyperlipidemia Sister      Social History     Tobacco Use    Smoking status: Never    Smokeless tobacco: Never   Substance Use Topics    Alcohol use: No    Drug use: No     Review of Systems    Physical Exam     Initial Vitals   BP Pulse Resp Temp SpO2   12/08/23 1115 12/08/23 1115 12/08/23 1115 12/08/23 1157 12/08/23  1115   (!) 143/91 98 16 97.9 °F (36.6 °C) 98 %      MAP       --                Physical Exam    Physical Exam:  CONSTITUTIONAL: Well developed, well nourished, in no acute distress.  HENT: Normocephalic, atraumatic    EYES: Sclerae anicteric, pupils equal round reactive, extraocular is are intact.   NECK: Supple, no thyroid enlargement  CARDIOVASCULAR: Regular rate and rhythm without any murmurs, gallops, rubs.  RESPIRATORY: Speaking in full sentences. Breathing comfortably. Auscultation of the lungs revealed normal breath sounds b/l, no wheezing, no rales, no rhonchi.  ABDOMEN: Soft and nontender, no masses, no rebound or guarding   NEUROLOGIC: Alert, interacting normally. No facial droop.  Intact strength upper and lower extremities, normal sensation to light touch upper and lower extremities, normal finger-nose bilaterally.  MSK: Moving all four extremities.  Skin: Warm and dry. No visible rash on exposed areas of skin.    Psych: Mood and affect normal.       ED Course   Procedures  Labs Reviewed   CBC W/ AUTO DIFFERENTIAL - Abnormal; Notable for the following components:       Result Value    RBC 3.89 (*)     Hematocrit 33.9 (*)     MCH 31.1 (*)     MPV 8.9 (*)     Lymph # 0.7 (*)     Gran % 86.2 (*)     Lymph % 9.3 (*)     Mono % 3.8 (*)     All other components within normal limits   TSH - Abnormal; Notable for the following components:    TSH 0.291 (*)     All other components within normal limits   APTT   PROTIME-INR   LIPID PANEL   HEMOGLOBIN A1C   T4, FREE   URINALYSIS, REFLEX TO URINE CULTURE   TYPE & SCREEN   POCT GLUCOSE   POCT GLUCOSE MONITORING CONTINUOUS          Imaging Results              CT Head Without Contrast (Final result)  Result time 12/08/23 17:16:55      Final result by Vic Browning MD (12/08/23 17:16:55)                   Impression:      Stable appearance of the subarachnoid hemorrhage.  No hydrocephalus.    Nonspecific prominent white matter changes.      Electronically signed  by: Vic Browning  Date:    12/08/2023  Time:    17:16               Narrative:    EXAMINATION:  CT HEAD WITHOUT CONTRAST    CLINICAL HISTORY:  Subarachnoid hemorrhage (SAH) suspected;    TECHNIQUE:  Low dose axial images were obtained through the head.  Coronal and sagittal reformations were also performed. Contrast was not administered.    COMPARISON:  12/08/2023    FINDINGS:  There is a stable appearance of mild subarachnoid hemorrhage along the right side of the brainstem.  No hydrocephalus.  No evidence of new hemorrhage.    No acute territorial infarct.  Prominent areas of decreased attenuation within the periventricular and subcortical white matter again noted nonspecific but may reflect advanced chronic small vessel ischemic change or other leukoencephalopathy, for clinical correlation as the appearance is somewhat atypical for more typically visualized chronic small vessel ischemic change.                                       CTA Head and Neck (xpd) (Final result)  Result time 12/08/23 12:03:59      Final result by Vic Browning MD (12/08/23 12:03:59)                   Impression:      Stable subarachnoid hemorrhage.  No hydrocephalus.    Nonspecific presumed chronic white matter changes again identified.    No intracranial aneurysm or AVM is identified.      Electronically signed by: Vic Browning  Date:    12/08/2023  Time:    12:03               Narrative:    EXAMINATION:  CTA HEAD AND NECK (XPD)    CLINICAL HISTORY:  Subarachnoid hemorrhage.    TECHNIQUE:  Non contrast low dose axial images were obtained through the head.  CT angiogram was performed from the level of the nat to the top of the head following the IV administration of 75mL of Omnipaque 350.   Sagittal and coronal reconstructions and maximum intensity projection reconstructions were performed. Arterial stenosis percentages are based on NASCET measurement criteria.    3D reformats were created on an independent workstation to  evaluate the [id number].    COMPARISON:  Head CT 12/08/2023    FINDINGS:  Head:    Mild subarachnoid hemorrhage along the right side of the brainstem extending into the ambient cistern.  No intraventricular hemorrhage is identified.  No hydrocephalus.    No acute territorial infarct.  Decreased attenuation within the periventricular and subcortical white matter is nonspecific but may reflect moderate chronic small vessel ischemic change versus other leukoencephalopathy.    No enhancing intracranial mass.    CTA:    There is no advanced stenosis at the origins of the vessels from the aortic arch.    No advanced stenosis at the origin of the vertebral arteries from the subclavian arteries. No advanced stenosis of the vertebral arteries in the neck.    There is no significant stenosis at the carotid bifurcations by NASCET criteria.    Intracranially there is no major branch advanced stenosis/occlusion at the [place] of fair.  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    No aneurysm or AVM is identified.  The venous sinuses are patent.    No soft tissue mass is identified in the neck.                                       Medications   niCARdipine 40 mg/200 mL (0.2 mg/mL) infusion (2.5 mg/hr Intravenous Rate/Dose Change 12/8/23 1899)   potassium bicarbonate disintegrating tablet 50 mEq (has no administration in time range)   potassium bicarbonate disintegrating tablet 35 mEq (has no administration in time range)   potassium bicarbonate disintegrating tablet 60 mEq (has no administration in time range)   magnesium oxide tablet 800 mg (has no administration in time range)   magnesium oxide tablet 800 mg (has no administration in time range)   potassium, sodium phosphates 280-160-250 mg packet 2 packet (has no administration in time range)   potassium, sodium phosphates 280-160-250 mg packet 2 packet (has no administration in time range)   potassium, sodium phosphates 280-160-250 mg packet 2 packet (has no  administration in time range)   acetaminophen tablet 650 mg (has no administration in time range)   oxyCODONE immediate release tablet 5 mg (has no administration in time range)   ondansetron disintegrating tablet 8 mg (has no administration in time range)   melatonin tablet 6 mg (has no administration in time range)   niMODipine capsule 60 mg (60 mg Oral Given 12/8/23 1411)   labetalol 20 mg/4 mL (5 mg/mL) IV syring (has no administration in time range)   senna-docusate 8.6-50 mg per tablet 1 tablet (has no administration in time range)   levETIRAcetam tablet 500 mg (has no administration in time range)   iohexoL (OMNIPAQUE 350) injection 75 mL (75 mLs Intravenous Given 12/8/23 1138)   HYDROmorphone injection 0.5 mg (0.5 mg Intravenous Given 12/8/23 1244)     Medical Decision Making  Amount and/or Complexity of Data Reviewed  Radiology: ordered.    Risk  Prescription drug management.  Decision regarding hospitalization.      58-year-old female with past medical history as noted coming in with subarachnoid hemorrhage not on blood thinners but did take some Excedrin after the headache started.  She is neuro intact.  On nicardipine drip.    Nicardipine drip ordered, will order CTA of the head and neck.  She declines further pain control this time.    Consult with Neuro Critical Care, Neurosurgery, vascular team.    Update:    CTA shows no aneurysms and no increased bleeding.  Given small dose of Dilaudid for pain control and up titrating nicardipine to meet blood pressure bolus.    Discussed platelets with neuro surgery team given her recent use of Excedrin, given the fact that bleed is stable plan is to not give at this time.    Admit to neuro critical care.    Critical Care Time:     The high probability of sudden, clinically significant deterioration in the patient's condition required the highest level of my preparedness to intervene urgently.    Services included the following: chart data review, reviewing  nursing notes and/or old charts, documentation time, consultant collaboration regarding findings and treatment options, medication orders and management, direct patient care, vital sign assessments and ordering, interpreting and reviewing diagnostic studies/lab tests.     I spent 35 minutes on total Critical Care time, which includes only time during which I was engaged in work directly related to the patient's care, as described above, whether at the bedside or elsewhere in the Emergency Department.  It did not include time spent on separately billable procedures nor did it include the time spent by residents, students, nurses or physician assistants on this patient's care.    Critical Care was needed secondary to the following conditions:  Subarachnoid hemorrhage                                      Clinical Impression:  Final diagnoses:  [I60.9] SAH (subarachnoid hemorrhage)          ED Disposition Condition    Admit                 Amol Chahal MD  12/08/23 1735       Amol Chahal MD  12/08/23 1737

## 2023-12-08 NOTE — CONSULTS
[facility] - Emergency Dept  Neurosurgery  Consult Note    Inpatient consult to Neurosurgery  Consult performed by: Kitty Shepherd PA-C  Consult ordered by: Amol Chahal MD        Subjective:     Chief Complaint/Reason for Admission: subarachnoid hemorrhage    History of Present Illness: 57 yo female with history of HTN transferred from [facility] for NSGY evaluation of subarachnoid hemorrhage. She presented to the emergency room with complaint of acute onset headache while working out this am. States that she took Excedrin migraine which helped a little bit.  States that she has never had a headache like this before.  Patient's systolic blood pressures greater than 200 upon arrival.  CTH at OSH shows small hemorrhage along the anterior and right lateral aspects of the katiuska, no IVH. Pt is not on blood thinners.      (Not in a hospital admission)      Review of patient's allergies indicates:   Allergen Reactions    Erythromycin     Pcn [penicillins]        Past Medical History:   Diagnosis Date    Asthma     Lung collapse     Pneumothorax     spontaneous     Past Surgical History:   Procedure Laterality Date    APPENDECTOMY      bilateral groin hernia repair       Family History       Problem Relation (Age of Onset)    Asthma Father    Bipolar disorder Sister    Cataracts Mother, Father    Chronic fatigue Sister    Diabetes Mother, Father    Heart disease Father    Hyperlipidemia Sister    Hypertension Mother, Father    Progressive Supranuclear Palsy Mother    Pulmonary embolism Mother    Stroke Mother          Tobacco Use    Smoking status: Never    Smokeless tobacco: Never   Substance and Sexual Activity    Alcohol use: No    Drug use: No    Sexual activity: Yes     Partners: Male     Review of Systems  Objective:     Weight: 63.5 kg (140 lb)  Body mass index is 25.61 kg/m².  Vital Signs (Most Recent):  Temp: 97.9 °F (36.6 °C) (12/08/23 1157)  Pulse: 96 (12/08/23 1300)  Resp: 12 (12/08/23 1300)  BP:  "(!) 140/63 (12/08/23 1300)  SpO2: 96 % (12/08/23 1300) Vital Signs (24h Range):  Temp:  [97.9 °F (36.6 °C)-98.1 °F (36.7 °C)] 97.9 °F (36.6 °C)  Pulse:  [] 96  Resp:  [10-20] 12  SpO2:  [93 %-100 %] 96 %  BP: (105-210)/() 140/63               Physical Exam     General: well developed, well nourished, no distress.   Head: normocephalic, atraumatic  Neurologic: Alert and oriented. Thought content appropriate.  GCS: Motor: 6/Verbal: 5/Eyes: 4 GCS Total: 15  Mental Status: Awake, Alert, Oriented x3  Cranial nerves: face symmetric, tongue midline, CN II-XII grossly intact.   Eyes: pupils equal, round, reactive to light with accomodation, EOMI.   Sensory: intact to light touch throughout  Motor Strength:Moves all extremities spontaneously with good tone.  Full strength upper and lower extremities. No abnormal movements seen.   Pronator Drift: no drift noted  Finger-to-nose: Intact bilaterally  Mild nuchal rigidity     Significant Labs:  Recent Labs   Lab 12/08/23  0826         K 4.3      CO2 25   BUN 15   CREATININE 0.88   CALCIUM 9.4     Recent Labs   Lab 12/08/23  0826   WBC 4.80   HGB 13.8   HCT 41.0        No results for input(s): "LABPT", "INR", "APTT" in the last 48 hours.  Microbiology Results (last 7 days)       ** No results found for the last 168 hours. **          All pertinent labs from the last 24 hours have been reviewed.    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 Patchy hypodense areas involving the bilateral subcortical cerebral white matter, nonspecific and possibly reflecting chronic small vessel ischemic change. Findings discussed with Dr. Doss at the time of this dictation. Electronically signed by: Praveen Olivas MD Date:    12/08/2023 Time:    09:08   Assessment/Plan:     Subarachnoid " hemorrhage  59 yo female with history of HTN who presents with nontraumatic prepontine subarachnoid hemorrhage, HH2F2    -Admit to [facility]  -Q1h neuro checks  -Imaging and Diagnostics reviewed  -CTA shows stable subarachnoid hemorrhage.  No hydrocephalus. No intracranial aneurysm or AVM is identified.  -Recommend cerebral angiogram with IR  -SBP<140  -HOB @30  -Keppra 500mg BID x 7 days  -TCDs daily  x 14d  -Nimotop 60q4h x 21d  -Eunatremic  -Continue to follow clinically and notify NSGY with any decline in neuro status. Further recommendations pending CTA  -Discussed with TONEY SalgadoC  Neurosurgery  [facility] - Emergency Dept

## 2023-12-08 NOTE — ED NOTES
Patient given socks, two warm blankets, and pillow for comfort measures. Denies any further complaints. Supportive spouse remains at bedside. Care ongoing.

## 2023-12-08 NOTE — SUBJECTIVE & OBJECTIVE
(Not in a hospital admission)      Review of patient's allergies indicates:   Allergen Reactions    Erythromycin     Pcn [penicillins]        Past Medical History:   Diagnosis Date    Asthma     Lung collapse     Pneumothorax     spontaneous     Past Surgical History:   Procedure Laterality Date    APPENDECTOMY      bilateral groin hernia repair       Family History       Problem Relation (Age of Onset)    Asthma Father    Bipolar disorder Sister    Cataracts Mother, Father    Chronic fatigue Sister    Diabetes Mother, Father    Heart disease Father    Hyperlipidemia Sister    Hypertension Mother, Father    Progressive Supranuclear Palsy Mother    Pulmonary embolism Mother    Stroke Mother          Tobacco Use    Smoking status: Never    Smokeless tobacco: Never   Substance and Sexual Activity    Alcohol use: No    Drug use: No    Sexual activity: Yes     Partners: Male     Review of Systems  Objective:     Weight: 63.5 kg (140 lb)  Body mass index is 25.61 kg/m².  Vital Signs (Most Recent):  Temp: 97.9 °F (36.6 °C) (12/08/23 1157)  Pulse: 96 (12/08/23 1300)  Resp: 12 (12/08/23 1300)  BP: (!) 140/63 (12/08/23 1300)  SpO2: 96 % (12/08/23 1300) Vital Signs (24h Range):  Temp:  [97.9 °F (36.6 °C)-98.1 °F (36.7 °C)] 97.9 °F (36.6 °C)  Pulse:  [] 96  Resp:  [10-20] 12  SpO2:  [93 %-100 %] 96 %  BP: (105-210)/() 140/63               Physical Exam     General: well developed, well nourished, no distress.   Head: normocephalic, atraumatic  Neurologic: Alert and oriented. Thought content appropriate.  GCS: Motor: 6/Verbal: 5/Eyes: 4 GCS Total: 15  Mental Status: Awake, Alert, Oriented x3  Cranial nerves: face symmetric, tongue midline, CN II-XII grossly intact.   Eyes: pupils equal, round, reactive to light with accomodation, EOMI.   Sensory: intact to light touch throughout  Motor Strength:Moves all extremities spontaneously with good tone.  Full strength upper and lower extremities. No abnormal movements seen.  "  Pronator Drift: no drift noted  Finger-to-nose: Intact bilaterally  Mild nuchal rigidity     Significant Labs:  Recent Labs   Lab 12/08/23  0826         K 4.3      CO2 25   BUN 15   CREATININE 0.88   CALCIUM 9.4     Recent Labs   Lab 12/08/23  0826   WBC 4.80   HGB 13.8   HCT 41.0        No results for input(s): "LABPT", "INR", "APTT" in the last 48 hours.  Microbiology Results (last 7 days)       ** No results found for the last 168 hours. **          All pertinent labs from the last 24 hours have been reviewed.    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 Patchy hypodense areas involving the bilateral subcortical cerebral white matter, nonspecific and possibly reflecting chronic small vessel ischemic change. Findings discussed with Dr. Doss at the time of this dictation. Electronically signed by: Praveen Olivas MD Date:    12/08/2023 Time:    09:08   "

## 2023-12-09 LAB
ALBUMIN SERPL BCP-MCNC: 3.7 G/DL (ref 3.5–5.2)
ALP SERPL-CCNC: 81 U/L (ref 55–135)
ALT SERPL W/O P-5'-P-CCNC: 15 U/L (ref 10–44)
ANION GAP SERPL CALC-SCNC: 9 MMOL/L (ref 8–16)
ASCENDING AORTA: 2.7 CM
AST SERPL-CCNC: 18 U/L (ref 10–40)
AV INDEX (PROSTH): 0.85
AV MEAN GRADIENT: 4 MMHG
AV PEAK GRADIENT: 9 MMHG
AV VALVE AREA BY VELOCITY RATIO: 2.13 CM²
AV VALVE AREA: 2.51 CM²
AV VELOCITY RATIO: 0.72
BASOPHILS # BLD AUTO: 0.04 K/UL (ref 0–0.2)
BASOPHILS NFR BLD: 0.5 % (ref 0–1.9)
BILIRUB SERPL-MCNC: 1 MG/DL (ref 0.1–1)
BSA FOR ECHO PROCEDURE: 1.71 M2
BUN SERPL-MCNC: 11 MG/DL (ref 6–20)
CALCIUM SERPL-MCNC: 8.9 MG/DL (ref 8.7–10.5)
CHLORIDE SERPL-SCNC: 109 MMOL/L (ref 95–110)
CO2 SERPL-SCNC: 18 MMOL/L (ref 23–29)
CREAT SERPL-MCNC: 0.9 MG/DL (ref 0.5–1.4)
CV ECHO LV RWT: 0.48 CM
DIFFERENTIAL METHOD: ABNORMAL
DOP CALC AO PEAK VEL: 1.51 M/S
DOP CALC AO VTI: 30.62 CM
DOP CALC LVOT AREA: 3 CM2
DOP CALC LVOT DIAMETER: 1.94 CM
DOP CALC LVOT PEAK VEL: 1.09 M/S
DOP CALC LVOT STROKE VOLUME: 76.76 CM3
DOP CALCLVOT PEAK VEL VTI: 25.98 CM
E WAVE DECELERATION TIME: 170.69 MSEC
E/A RATIO: 1.18
E/E' RATIO: 9.52 M/S
ECHO LV POSTERIOR WALL: 0.79 CM (ref 0.6–1.1)
EJECTION FRACTION: 65 %
EOSINOPHIL # BLD AUTO: 0.1 K/UL (ref 0–0.5)
EOSINOPHIL NFR BLD: 0.9 % (ref 0–8)
ERYTHROCYTE [DISTWIDTH] IN BLOOD BY AUTOMATED COUNT: 12.8 % (ref 11.5–14.5)
EST. GFR  (NO RACE VARIABLE): >60 ML/MIN/1.73 M^2
FRACTIONAL SHORTENING: 30 % (ref 28–44)
GLUCOSE SERPL-MCNC: 108 MG/DL (ref 70–110)
HCT VFR BLD AUTO: 37.3 % (ref 37–48.5)
HGB BLD-MCNC: 13.2 G/DL (ref 12–16)
IMM GRANULOCYTES # BLD AUTO: 0.02 K/UL (ref 0–0.04)
IMM GRANULOCYTES NFR BLD AUTO: 0.2 % (ref 0–0.5)
INTERVENTRICULAR SEPTUM: 0.91 CM (ref 0.6–1.1)
IVRT: 114.18 MSEC
LA MAJOR: 5.17 CM
LA MINOR: 4.77 CM
LA WIDTH: 3.53 CM
LEFT ATRIUM SIZE: 2.77 CM
LEFT ATRIUM VOLUME INDEX MOD: 25.7 ML/M2
LEFT ATRIUM VOLUME INDEX: 24.3 ML/M2
LEFT ATRIUM VOLUME MOD: 43.7 CM3
LEFT ATRIUM VOLUME: 41.24 CM3
LEFT INTERNAL DIMENSION IN SYSTOLE: 2.33 CM (ref 2.1–4)
LEFT VENTRICLE DIASTOLIC VOLUME INDEX: 26.34 ML/M2
LEFT VENTRICLE DIASTOLIC VOLUME: 44.77 ML
LEFT VENTRICLE MASS INDEX: 44 G/M2
LEFT VENTRICLE SYSTOLIC VOLUME INDEX: 11 ML/M2
LEFT VENTRICLE SYSTOLIC VOLUME: 18.78 ML
LEFT VENTRICULAR INTERNAL DIMENSION IN DIASTOLE: 3.32 CM (ref 3.5–6)
LEFT VENTRICULAR MASS: 75.41 G
LV LATERAL E/E' RATIO: 9.09 M/S
LV SEPTAL E/E' RATIO: 10 M/S
LYMPHOCYTES # BLD AUTO: 1.8 K/UL (ref 1–4.8)
LYMPHOCYTES NFR BLD: 21.8 % (ref 18–48)
MAGNESIUM SERPL-MCNC: 3.3 MG/DL (ref 1.6–2.6)
MCH RBC QN AUTO: 30.7 PG (ref 27–31)
MCHC RBC AUTO-ENTMCNC: 35.4 G/DL (ref 32–36)
MCV RBC AUTO: 87 FL (ref 82–98)
MONOCYTES # BLD AUTO: 0.6 K/UL (ref 0.3–1)
MONOCYTES NFR BLD: 6.9 % (ref 4–15)
MV PEAK A VEL: 0.85 M/S
MV PEAK E VEL: 1 M/S
MV STENOSIS PRESSURE HALF TIME: 49.5 MS
MV VALVE AREA P 1/2 METHOD: 4.44 CM2
NEUTROPHILS # BLD AUTO: 5.7 K/UL (ref 1.8–7.7)
NEUTROPHILS NFR BLD: 69.7 % (ref 38–73)
NRBC BLD-RTO: 0 /100 WBC
PHOSPHATE SERPL-MCNC: 3.4 MG/DL (ref 2.7–4.5)
PISA TR MAX VEL: 1.93 M/S
PLATELET # BLD AUTO: 227 K/UL (ref 150–450)
PMV BLD AUTO: 9.1 FL (ref 9.2–12.9)
POTASSIUM SERPL-SCNC: 3.9 MMOL/L (ref 3.5–5.1)
PROT SERPL-MCNC: 6.9 G/DL (ref 6–8.4)
RA MAJOR: 3.99 CM
RA PRESSURE ESTIMATED: 3 MMHG
RA WIDTH: 2.82 CM
RBC # BLD AUTO: 4.3 M/UL (ref 4–5.4)
RIGHT VENTRICULAR END-DIASTOLIC DIMENSION: 2.79 CM
RV TB RVSP: 5 MMHG
SINUS: 3.27 CM
SODIUM SERPL-SCNC: 136 MMOL/L (ref 136–145)
STJ: 2.67 CM
TDI LATERAL: 0.11 M/S
TDI SEPTAL: 0.1 M/S
TDI: 0.11 M/S
TR MAX PG: 15 MMHG
TRICUSPID ANNULAR PLANE SYSTOLIC EXCURSION: 1.45 CM
TV REST PULMONARY ARTERY PRESSURE: 18 MMHG
WBC # BLD AUTO: 8.13 K/UL (ref 3.9–12.7)
Z-SCORE OF LEFT VENTRICULAR DIMENSION IN END DIASTOLE: -3.53
Z-SCORE OF LEFT VENTRICULAR DIMENSION IN END SYSTOLE: -1.81

## 2023-12-09 PROCEDURE — 25000003 PHARM REV CODE 250

## 2023-12-09 PROCEDURE — 97530 THERAPEUTIC ACTIVITIES: CPT

## 2023-12-09 PROCEDURE — 84100 ASSAY OF PHOSPHORUS: CPT

## 2023-12-09 PROCEDURE — 92610 EVALUATE SWALLOWING FUNCTION: CPT

## 2023-12-09 PROCEDURE — 99291 CRITICAL CARE FIRST HOUR: CPT | Mod: ,,, | Performed by: PSYCHIATRY & NEUROLOGY

## 2023-12-09 PROCEDURE — 85025 COMPLETE CBC W/AUTO DIFF WBC: CPT

## 2023-12-09 PROCEDURE — 99233 SBSQ HOSP IP/OBS HIGH 50: CPT | Mod: ,,, | Performed by: NEUROLOGICAL SURGERY

## 2023-12-09 PROCEDURE — 94761 N-INVAS EAR/PLS OXIMETRY MLT: CPT

## 2023-12-09 PROCEDURE — 99233 PR SUBSEQUENT HOSPITAL CARE,LEVL III: ICD-10-PCS | Mod: ,,, | Performed by: NEUROLOGICAL SURGERY

## 2023-12-09 PROCEDURE — 97535 SELF CARE MNGMENT TRAINING: CPT

## 2023-12-09 PROCEDURE — 83735 ASSAY OF MAGNESIUM: CPT

## 2023-12-09 PROCEDURE — 25000003 PHARM REV CODE 250: Performed by: PSYCHIATRY & NEUROLOGY

## 2023-12-09 PROCEDURE — 63600175 PHARM REV CODE 636 W HCPCS

## 2023-12-09 PROCEDURE — 97165 OT EVAL LOW COMPLEX 30 MIN: CPT

## 2023-12-09 PROCEDURE — 99291 PR CRITICAL CARE, E/M 30-74 MINUTES: ICD-10-PCS | Mod: ,,, | Performed by: PSYCHIATRY & NEUROLOGY

## 2023-12-09 PROCEDURE — 20000000 HC ICU ROOM

## 2023-12-09 PROCEDURE — 99233 SBSQ HOSP IP/OBS HIGH 50: CPT | Mod: ,,, | Performed by: STUDENT IN AN ORGANIZED HEALTH CARE EDUCATION/TRAINING PROGRAM

## 2023-12-09 PROCEDURE — 99233 PR SUBSEQUENT HOSPITAL CARE,LEVL III: ICD-10-PCS | Mod: ,,, | Performed by: STUDENT IN AN ORGANIZED HEALTH CARE EDUCATION/TRAINING PROGRAM

## 2023-12-09 PROCEDURE — 80053 COMPREHEN METABOLIC PANEL: CPT

## 2023-12-09 RX ADMIN — LEVETIRACETAM 500 MG: 500 TABLET, FILM COATED ORAL at 09:12

## 2023-12-09 RX ADMIN — SODIUM CHLORIDE 500 ML: 9 INJECTION, SOLUTION INTRAVENOUS at 12:12

## 2023-12-09 RX ADMIN — METHOCARBAMOL 500 MG: 500 TABLET ORAL at 09:12

## 2023-12-09 RX ADMIN — GABAPENTIN 300 MG: 300 CAPSULE ORAL at 02:12

## 2023-12-09 RX ADMIN — ACETAMINOPHEN 650 MG: 325 TABLET ORAL at 05:12

## 2023-12-09 RX ADMIN — AMLODIPINE BESYLATE 5 MG: 5 TABLET ORAL at 09:12

## 2023-12-09 RX ADMIN — NIMODIPINE 60 MG: 30 CAPSULE, LIQUID FILLED ORAL at 01:12

## 2023-12-09 RX ADMIN — GABAPENTIN 300 MG: 300 CAPSULE ORAL at 09:12

## 2023-12-09 RX ADMIN — ONDANSETRON 8 MG: 8 TABLET, ORALLY DISINTEGRATING ORAL at 10:12

## 2023-12-09 RX ADMIN — NIMODIPINE 60 MG: 30 CAPSULE, LIQUID FILLED ORAL at 06:12

## 2023-12-09 RX ADMIN — NIMODIPINE 60 MG: 30 CAPSULE, LIQUID FILLED ORAL at 09:12

## 2023-12-09 RX ADMIN — NIMODIPINE 60 MG: 30 CAPSULE, LIQUID FILLED ORAL at 05:12

## 2023-12-09 RX ADMIN — OXYCODONE HYDROCHLORIDE 5 MG: 5 TABLET ORAL at 07:12

## 2023-12-09 RX ADMIN — MAGNESIUM SULFATE HEPTAHYDRATE 2 G: 40 INJECTION, SOLUTION INTRAVENOUS at 12:12

## 2023-12-09 RX ADMIN — OXYCODONE HYDROCHLORIDE 5 MG: 5 TABLET ORAL at 01:12

## 2023-12-09 RX ADMIN — ACETAMINOPHEN 650 MG: 325 TABLET ORAL at 07:12

## 2023-12-09 RX ADMIN — METHOCARBAMOL 500 MG: 500 TABLET ORAL at 05:12

## 2023-12-09 NOTE — PLAN OF CARE
Goals remain appropriate.  Problem: Occupational Therapy  Goal: Occupational Therapy Goal  Description: Goals set 12/9 to be addressed for 14 days with expiration date, 12/23:  Patient will increase functional independence with ADLs by performing:  Patient will demonstrate stand pivot transfers with independence.   Not met  Patient will demonstrate grooming while standing with independence.   Not met  Patient will demonstrate upper body dressing with independence while seated EOB.   Not met  Patient will demonstrate lower body dressing with independence while seated EOB.   Not met  Patient will demonstrate toileting with independence.   Not met  Patient will demonstrate bathing while seated EOB with independence.   Not met  Patient's family / caregiver will demonstrate independence and safety with assisting patient with self-care skills and functional mobility.     Not met          Outcome: Ongoing, Progressing

## 2023-12-09 NOTE — HOSPITAL COURSE
12/9/23 patient reports improvement in HA, rates pain 2/10, persistent photophobia, exam nonfocal, CTA negative for aneurysm or other cerebrovascular abnormality, DSA pending, TCD pending  12/12/23: NAEON. Neuro exam stable. Patient continues to have intermittent headaches, neck pain, and nausea. Patient reports relief with zofran. Tylenol was given for headache and neck pain. Daily TCDs in progress, no evidence of vasospasms noted for today's TCD. Patient stable for NPU stepdown.  12/13/23: NAEON. Neuro exam stable. Patient continues to have persistent photophobia and headaches. TCDs stable. Patient pending NPU stepdown.  12/14/23: HA more uncontrolled today with new phonophobia, STAT CTH pending, increasing amlodipine, adjusting HA medication regimen, repeating CTA tomorrow  12/15/23: HA exacerbated overnight, improved this AM.  Reglan, standing tylenol, and increasing GBP helped.  Pending CTA.

## 2023-12-09 NOTE — HOSPITAL COURSE
12/9/23: Follow up CTH shows stable SAH. Likely to go for cerebral angiogram later this afternoon.  12/10/23: 1 liter bolus  12/11/23: Post SAH day 3. NAEO. TCDs stable. To go for cerebral angiogram today. Headache pain controlled w/ current pain regimen.  12/12/23: Normal cerebral angiogram. Will go for CTA Friday. If normal, can discharge home. Discontinued Keppra and Nimodipine.

## 2023-12-09 NOTE — SUBJECTIVE & OBJECTIVE
Interval History: 12/9: HA improving this AM, no n/v. Hypertensive initially. Pending angiogram.     Medications:  Continuous Infusions:   nicardipine Stopped (12/08/23 2322)     Scheduled Meds:   amLODIPine  5 mg Oral Daily    gabapentin  300 mg Oral TID    levETIRAcetam  500 mg Oral BID    methocarbamoL  500 mg Oral QID    niMODipine  60 mg Oral Q4H     PRN Meds:acetaminophen, labetalol, magnesium oxide, magnesium oxide, melatonin, ondansetron, oxyCODONE, potassium bicarbonate, potassium bicarbonate, potassium bicarbonate, potassium, sodium phosphates, potassium, sodium phosphates, potassium, sodium phosphates     Review of Systems  Objective:     Weight: 65.1 kg (143 lb 8.3 oz)  Body mass index is 24.64 kg/m².  Vital Signs (Most Recent):  Temp: 98.1 °F (36.7 °C) (12/09/23 0705)  Pulse: 72 (12/09/23 0825)  Resp: 16 (12/09/23 0705)  BP: (!) 125/58 (12/09/23 0705)  SpO2: 96 % (12/09/23 0825) Vital Signs (24h Range):  Temp:  [97.9 °F (36.6 °C)-98.2 °F (36.8 °C)] 98.1 °F (36.7 °C)  Pulse:  [] 72  Resp:  [10-28] 16  SpO2:  [91 %-100 %] 96 %  BP: (101-198)/(52-98) 125/58     Date 12/09/23 0700 - 12/10/23 0659   Shift 0616-1239 0165-7895 0987-8037 24 Hour Total   INTAKE   P.O. 400   400   Shift Total(mL/kg) 400(6.1)   400(6.1)   OUTPUT   Urine(mL/kg/hr) 900   900   Shift Total(mL/kg) 900(13.8)   900(13.8)   Weight (kg) 65.1 65.1 65.1 65.1                               Physical Exam         Neurosurgery Physical Exam    Physical Exam  General: well developed, well nourished, no distress.   Head: normocephalic, atraumatic  Neurologic: Alert and oriented. Thought content appropriate.  GCS: Motor: 6/Verbal: 5/Eyes: 4 GCS Total: 15  Mental Status: Awake, Alert, Oriented x3  Cranial nerves: face symmetric, tongue midline, CN II-XII grossly intact.   Eyes: pupils equal, round, reactive to light with accomodation, EOMI.   Sensory: intact to light touch throughout  Motor Strength:Moves all extremities spontaneously with  good tone.  Full strength upper and lower extremities. No abnormal movements seen.   Pronator Drift: no drift noted  Finger-to-nose: Intact bilaterally  Mild nuchal rigidity     Significant Labs:  Recent Labs   Lab 12/08/23  0826 12/09/23  0202    108    136   K 4.3 3.9    109   CO2 25 18*   BUN 15 11   CREATININE 0.88 0.9   CALCIUM 9.4 8.9   MG  --  3.3*     Recent Labs   Lab 12/08/23  0826 12/08/23  1403 12/09/23  0202   WBC 4.80 7.35 8.13   HGB 13.8 12.1 13.2   HCT 41.0 33.9* 37.3    191 227     Recent Labs   Lab 12/08/23  1214   INR 1.0   APTT 27.0     Microbiology Results (last 7 days)       ** No results found for the last 168 hours. **          All pertinent labs from the last 24 hours have been reviewed.    Significant Diagnostics:  CT: CT Head Without Contrast    Result Date: 12/8/2023  Stable appearance of the subarachnoid hemorrhage.  No hydrocephalus. Nonspecific prominent white matter changes. 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 Patchy hypodense areas involving the bilateral subcortical cerebral white matter, nonspecific and possibly reflecting chronic small vessel ischemic change. Findings discussed with Dr. Doss at the time of this dictation. Electronically signed by: Praveen Olivas MD Date:    12/08/2023 Time:    09:08   MRI: No results found in the last 24 hours.

## 2023-12-09 NOTE — PROGRESS NOTES
[facility] - Neuro Critical Care  Vascular Neurology  Comprehensive Stroke Center  Progress Note    Assessment/Plan:     * Nontraumatic subarachnoid hemorrhage  Laura Newell is a 58 y.o. female with a significant medical history of asthma who presents to the hospital as a transfer from [facility] for evaluation of SAH.  She was hypertensive on presentation to the OSH ED, 200s/100s.  CT with perimesencephalic SAH. CTA negative for aneurysm or other cerebrovascular abnormalities. DSA pending    Continue daily TCDs, nimodipine daily      Antithrombotics for secondary stroke prevention: Antiplatelets: None: Intracerebral Hemorrhage    Statins for secondary stroke prevention and hyperlipidemia, if present:   Statins: None: Reason: SAH    Aggressive risk factor modification: None     Rehab efforts: The patient has been evaluated by a stroke team provider and the therapy needs have been fully considered based off the presenting complaints and exam findings. The following therapy evaluations are needed: PT evaluate and treat, OT evaluate and treat    Diagnostics ordered/pending: Other: DSA, daily TCDs    VTE prophylaxis: Mechanical prophylaxis: Place SCDs  None: Reason for No Pharmacological VTE Prophylaxis: SAH    BP parameters: SAH: SBP<140        Elevated cholesterol  -Stroke risk factor.  Noted in the patient's record as of 6/2020.  Patient not currently prescribed statin.  -Lipid panel WNL  -statin not indicated    Essential hypertension  -Stroke risk factor.  Noted in the patient's record as of 8/2019.  Patient not currently prescribed medications.  -Initial SBP 200s.  -SBP <140  -Initially on cardene, currently off at this time  -on amlodipine 5 mg daily  -Monitor for need to d/c on home BP meds         12/9/23 patient reports improvement in HA, rates pain 2/10, persistent photophobia, exam nonfocal, CTA negative for aneurysm or other cerebrovascular abnormality, DSA pending, TCD  pending    STROKE DOCUMENTATION        NIH Scale:  1a. Level of Consciousness: 0-->Alert, keenly responsive  1b. LOC Questions: 0-->Answers both questions correctly  1c. LOC Commands: 0-->Performs both tasks correctly  2. Best Gaze: 0-->Normal  3. Visual: 0-->No visual loss  4. Facial Palsy: 0-->Normal symmetrical movements  5a. Motor Arm, Left: 0-->No drift, limb holds 90 (or 45) degrees for full 10 secs  5b. Motor Arm, Right: 0-->No drift, limb holds 90 (or 45) degrees for full 10 secs  6a. Motor Leg, Left: 0-->No drift, leg holds 30 degree position for full 5 secs  6b. Motor Leg, Right: 0-->No drift, leg holds 30 degree position for full 5 secs  7. Limb Ataxia: 0-->Absent  8. Sensory: 0-->Normal, no sensory loss  9. Best Language: 0-->No aphasia, normal  10. Dysarthria: 0-->Normal  11. Extinction and Inattention (formerly Neglect): 0-->No abnormality  Total (NIH Stroke Scale): 0       Modified [place] Score: 0  [place] Coma Scale:    ABCD2 Score:    [id number] Score:   HAS -BLED Score:   ICH Score:0  Hunt & Bravo Classification:Grade I     Hemorrhagic change of an Ischemic Stroke: Does this patient have an ischemic stroke with hemorrhagic changes? No     Neurologic Chief Complaint: HA and photophobia    Subjective:     Interval History: Patient is seen for follow-up neurological assessment and treatment recommendations:  patient reports improvement in HA, rates pain 2/10, persistent photophobia, exam nonfocal, CTA negative for aneurysm or other cerebrovascular abnormality, DSA pending, TCD pending    HPI, Past Medical, Family, and Social History remains the same as documented in the initial encounter.     Review of Systems   Eyes:  Positive for photophobia.   Neurological:  Positive for headaches. Negative for facial asymmetry, speech difficulty, weakness and numbness.     Scheduled Meds:   amLODIPine  5 mg Oral Daily    gabapentin  300 mg Oral TID    levETIRAcetam  500 mg Oral BID    methocarbamoL  500 mg Oral  QID    niMODipine  60 mg Oral Q4H     Continuous Infusions:   nicardipine Stopped (12/08/23 2322)     PRN Meds:acetaminophen, labetalol, magnesium oxide, magnesium oxide, melatonin, ondansetron, oxyCODONE, potassium bicarbonate, potassium bicarbonate, potassium bicarbonate, potassium, sodium phosphates, potassium, sodium phosphates, potassium, sodium phosphates    Objective:     Vital Signs (Most Recent):  Temp: 98.2 °F (36.8 °C) (12/09/23 1105)  Pulse: 80 (12/09/23 1305)  Resp: 20 (12/09/23 1308)  BP: 102/65 (12/09/23 1305)  SpO2: 100 % (12/09/23 1305)  BP Location: Left arm    Vital Signs Range (Last 24H):  Temp:  [98 °F (36.7 °C)-98.2 °F (36.8 °C)]   Pulse:  []   Resp:  [10-28]   BP: ()/(52-74)   SpO2:  [91 %-100 %]   BP Location: Left arm       Physical Exam  Vitals reviewed.   Constitutional:       General: She is not in acute distress.  HENT:      Head: Normocephalic and atraumatic.   Cardiovascular:      Rate and Rhythm: Normal rate.   Pulmonary:      Effort: Pulmonary effort is normal. No respiratory distress.   Skin:     General: Skin is warm and dry.   Neurological:      Mental Status: She is alert.              Neurological Exam:   LOC: alert  Attention Span: Good   Language: No aphasia  Articulation: No dysarthria  Orientation: Person, Place, Time   Visual Fields: Full  EOM (CN III, IV, VI): Full/intact  Facial Movement (CN VII): Symmetric facial expression    Motor: Arm left  Normal 5/5  Leg left  Normal 5/5  Arm right  Normal 5/5  Leg right Normal 5/5  Cerebellum: No evidence of appendicular or axial ataxia  Sensation: Intact to light touch, temperature and vibration  Tone: Normal tone throughout    Laboratory:  CMP:   Recent Labs   Lab 12/09/23 0202   CALCIUM 8.9   ALBUMIN 3.7   PROT 6.9      K 3.9   CO2 18*      BUN 11   CREATININE 0.9   ALKPHOS 81   ALT 15   AST 18   BILITOT 1.0     CBC:   Recent Labs   Lab 12/09/23 0202   WBC 8.13   RBC 4.30   HGB 13.2   HCT 37.3     "  MCV 87   MCH 30.7   MCHC 35.4     Lipid Panel:   Recent Labs   Lab 12/08/23  1403   CHOL 181   LDLCALC 128.4   HDL 46   TRIG 33     Coagulation:   Recent Labs   Lab 12/08/23  1214   INR 1.0   APTT 27.0     Platelet Aggregation Study: No results for input(s): "PLTAGG", "PLTAGINTERP", "PLTAGREGLACO", "ADPPLTAGGREG" in the last 168 hours.  Hgb A1C:   Recent Labs   Lab 12/08/23  1403   HGBA1C 5.4     TSH:   Recent Labs   Lab 12/08/23  1403   TSH 0.291*       Diagnostic Results     Brain Imaging   CT 12/8/2023 @1657  Impression:     Stable appearance of the subarachnoid hemorrhage.  No hydrocephalus.     Nonspecific prominent white matter changes.     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  Patchy hypodense areas involving the bilateral subcortical cerebral white matter, nonspecific and possibly reflecting chronic small vessel ischemic change.    Vessel Imaging   CTA Head and Neck 12/8/2023     Impression: Stable subarachnoid hemorrhage.  No hydrocephalus.  Nonspecific presumed chronic white matter changes again identified.  No intracranial aneurysm or AVM is identified.    Cardiac Imaging   TTE pending    Cande Ham PA-C  Comprehensive Stroke Center  Department of Vascular Neurology   [facility] - Neuro Critical Care        "

## 2023-12-09 NOTE — HOSPITAL COURSE
12/9: HA improving this AM, no n/v. Hypertensive initially. Pending angiogram.   12/10 naeon, angiogram today.  12/11/2023 NAEON. Neuro exam stable  12/12/2023 NAEON Neuro exam stable. Vasospasm watch  12/13/2023 naeon exam stable, continue to monitor until PBD7  12/14: PBD 6. NAEO. Complains of persistent headache but no new complaints. Exam stable.   12/15/2023 PBD 7. NAEON. AFVSS. Pt reports bad HA last night but much improved this am after Reglan and other meds. No other complaints. Awaiting repeat CTA today.   12/16/2023: PBD 8. CTA with resolution of SAH and otherwise negative. MRI pending. Patient with headache improved with current regimen.

## 2023-12-09 NOTE — NURSING
Patient arrived to [facility] from ED 1     Type of stroke/diagnosis:  SAH     Current symptoms: AAO x 4, photophobia and headache present. Moving everything spontaneously. No numbness or tingling    Skin Assessment done: Yes  Wounds noted: none  *If wounds noted, was Wound Care consulted? no  *If wounds noted, LDA placed? no  Skin Assessment Verified by:  Clayton Portillo Completed? Yes    Patient Belongings on Admit: apple watch    [facility] notified: Carol JAY

## 2023-12-09 NOTE — PLAN OF CARE
Recommendations     1. When able, ADAT to Regular.   2. RD to monitor & follow-up.     Goals: Meet % EEN, EPN by RD f/u date  Nutrition Goal Status: new  Communication of RD Recs: reviewed with RN

## 2023-12-09 NOTE — SUBJECTIVE & OBJECTIVE
Neurologic Chief Complaint: HA and photophobia    Subjective:     Interval History: Patient is seen for follow-up neurological assessment and treatment recommendations:  patient reports improvement in HA, rates pain 2/10, persistent photophobia, exam nonfocal, CTA negative for aneurysm or other cerebrovascular abnormality, DSA pending, TCD pending    HPI, Past Medical, Family, and Social History remains the same as documented in the initial encounter.     Review of Systems   Eyes:  Positive for photophobia.   Neurological:  Positive for headaches. Negative for facial asymmetry, speech difficulty, weakness and numbness.     Scheduled Meds:   amLODIPine  5 mg Oral Daily    gabapentin  300 mg Oral TID    levETIRAcetam  500 mg Oral BID    methocarbamoL  500 mg Oral QID    niMODipine  60 mg Oral Q4H     Continuous Infusions:   nicardipine Stopped (12/08/23 2322)     PRN Meds:acetaminophen, labetalol, magnesium oxide, magnesium oxide, melatonin, ondansetron, oxyCODONE, potassium bicarbonate, potassium bicarbonate, potassium bicarbonate, potassium, sodium phosphates, potassium, sodium phosphates, potassium, sodium phosphates    Objective:     Vital Signs (Most Recent):  Temp: 98.2 °F (36.8 °C) (12/09/23 1105)  Pulse: 80 (12/09/23 1305)  Resp: 20 (12/09/23 1308)  BP: 102/65 (12/09/23 1305)  SpO2: 100 % (12/09/23 1305)  BP Location: Left arm    Vital Signs Range (Last 24H):  Temp:  [98 °F (36.7 °C)-98.2 °F (36.8 °C)]   Pulse:  []   Resp:  [10-28]   BP: ()/(52-74)   SpO2:  [91 %-100 %]   BP Location: Left arm       Physical Exam  Vitals reviewed.   Constitutional:       General: She is not in acute distress.  HENT:      Head: Normocephalic and atraumatic.   Cardiovascular:      Rate and Rhythm: Normal rate.   Pulmonary:      Effort: Pulmonary effort is normal. No respiratory distress.   Skin:     General: Skin is warm and dry.   Neurological:      Mental Status: She is alert.              Neurological Exam:   LOC:  "alert  Attention Span: Good   Language: No aphasia  Articulation: No dysarthria  Orientation: Person, Place, Time   Visual Fields: Full  EOM (CN III, IV, VI): Full/intact  Facial Movement (CN VII): Symmetric facial expression    Motor: Arm left  Normal 5/5  Leg left  Normal 5/5  Arm right  Normal 5/5  Leg right Normal 5/5  Cerebellum: No evidence of appendicular or axial ataxia  Sensation: Intact to light touch, temperature and vibration  Tone: Normal tone throughout    Laboratory:  CMP:   Recent Labs   Lab 12/09/23  0202   CALCIUM 8.9   ALBUMIN 3.7   PROT 6.9      K 3.9   CO2 18*      BUN 11   CREATININE 0.9   ALKPHOS 81   ALT 15   AST 18   BILITOT 1.0     CBC:   Recent Labs   Lab 12/09/23  0202   WBC 8.13   RBC 4.30   HGB 13.2   HCT 37.3      MCV 87   MCH 30.7   MCHC 35.4     Lipid Panel:   Recent Labs   Lab 12/08/23  1403   CHOL 181   LDLCALC 128.4   HDL 46   TRIG 33     Coagulation:   Recent Labs   Lab 12/08/23  1214   INR 1.0   APTT 27.0     Platelet Aggregation Study: No results for input(s): "PLTAGG", "PLTAGINTERP", "PLTAGREGLACO", "ADPPLTAGGREG" in the last 168 hours.  Hgb A1C:   Recent Labs   Lab 12/08/23  1403   HGBA1C 5.4     TSH:   Recent Labs   Lab 12/08/23  1403   TSH 0.291*       Diagnostic Results     Brain Imaging   [facility] 12/8/2023 @1657  Impression:     Stable appearance of the subarachnoid hemorrhage.  No hydrocephalus.     Nonspecific prominent white matter changes.     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  Patchy hypodense areas involving the bilateral subcortical cerebral white matter, nonspecific and possibly reflecting chronic small vessel ischemic change.    Vessel Imaging   [facility] 12/8/2023     Impression: Stable subarachnoid hemorrhage.  No hydrocephalus.  Nonspecific presumed chronic white matter changes again identified.  No " intracranial aneurysm or AVM is identified.    Cardiac Imaging   TTE pending

## 2023-12-09 NOTE — CONSULTS
"[facility] - Neuro Critical Care  Adult Nutrition  Consult Note    SUMMARY     Recommendations    1. When able, ADAT to Regular.   2. RD to monitor & follow-up.    Goals: Meet % EEN, EPN by RD f/u date  Nutrition Goal Status: new  Communication of RD Recs: reviewed with RN    Assessment and Plan    Nutrition Problem:  Inadequate energy intake    Related to (etiology):   Inability to consume sufficient energy     Signs and Symptoms (as evidenced by):   NPO    Interventions(treatment strategy):  Collaboration of nutrition care w/ other providers  ADAT    Nutrition Diagnosis Status:   New    Reason for Assessment    Reason For Assessment: consult  Diagnosis: other (see comments) (SAH)  Relevant Medical History: HTN  Interdisciplinary Rounds: did not attend    General Information Comments: SLP recs Regular diet w/ thin liquids, however pt remains NPO. Pt reports excellent appetite PTA & UBW of 147#. Appears nourished w/ no indicators of malnutrition.  Nutrition Discharge Planning: Adequate PO intake    Nutrition/Diet History    Patient Reported Diet/Restrictions/Preferences: general  Spiritual, Cultural Beliefs, Jewish Practices, Values that Affect Care: no  Factors Affecting Nutritional Intake: NPO    Anthropometrics    Temp: 98.1 °F (36.7 °C)  Height Method: Stated  Height: 5' 4" (162.6 cm)  Height (inches): 64 in  Weight Method: Bed Scale  Weight: 65.1 kg (143 lb 8.3 oz)  Weight (lb): 143.52 lb  Ideal Body Weight (IBW), Female: 120 lb  % Ideal Body Weight, Female (lb): 119.6 %  BMI (Calculated): 24.6  BMI Grade: 18.5-24.9 - normal    Lab/Procedures/Meds    Pertinent Labs Reviewed: reviewed  Pertinent Medications Reviewed: reviewed  Pertinent Medications Comments: Nicardipine    Estimated/Assessed Needs    Weight Used For Calorie Calculations: 65.1 kg (143 lb 8.3 oz)    Energy Calorie Requirements (kcal): 1508 kcal/d  Energy Need Method: Jayleen Brothers (1.25 PAL)    Protein Requirements: 65 g/d (1 " g/kg)  Weight Used For Protein Calculations: 65.1 kg (143 lb 8.3 oz)    Estimated Fluid Requirement Method: other (see comments) (Per MD or 1 mL/kcal)  RDA Method (mL): 1508    Nutrition Prescription Ordered    Current Diet Order: NPO    Evaluation of Received Nutrient/Fluid Intake    I/O: -4.5L since admit    Comments: LBM: 12/8    Nutrition Risk    Level of Risk/Frequency of Follow-up:  (1x/week)     Monitor and Evaluation    Food and Nutrient Intake: food and beverage intake, energy intake  Food and Nutrient Adminstration: diet order  Physical Activity and Function: nutrition-related ADLs and IADLs  Anthropometric Measurements: weight change  Biochemical Data, Medical Tests and Procedures: glucose/endocrine profile, lipid profile, inflammatory profile, gastrointestinal profile, electrolyte and renal panel  Nutrition-Focused Physical Findings: overall appearance     Nutrition Follow-Up    RD Follow-up?: Yes

## 2023-12-09 NOTE — ED NOTES
Took report from Verna RN and Carol RN, and assumed care of pt at this time with Lisa ESPARZA. Pt resting comfortably and independently repositioned in stretcher with bed locked in lowest position for safety. NAD noted at this time. Respirations even and unlabored and visible chest rise noted.  Patient offered bathroom assistance and denies need at this time. Pt instructed to call if assistance is needed. Pt on continuous cardiac, BP, and O2 monitoring. Call light within reach. Family at bedside. No needs at this time. Will continue to monitor.

## 2023-12-09 NOTE — SUBJECTIVE & OBJECTIVE
Interval History:  NAEO. Continues to experience headache and photophobia.  Follow up CTH shows stable SAH. Likely to go for cerebral angiogram later this afternoon.    Review of Systems   Constitutional:  Negative for fatigue and fever.   HENT: Negative.     Eyes:  Positive for photophobia.   Respiratory: Negative.     Cardiovascular:  Negative for chest pain and palpitations.   Gastrointestinal: Negative.    Endocrine: Negative.    Genitourinary: Negative.    Musculoskeletal: Negative.  Negative for neck stiffness.   Skin: Negative.    Neurological:  Positive for headaches. Negative for dizziness, seizures, syncope, facial asymmetry, speech difficulty, weakness, light-headedness and numbness.   Psychiatric/Behavioral: Negative.         Objective:     Vitals:  Temp: 98.1 °F (36.7 °C)  Pulse: 70  Rhythm: normal sinus rhythm  BP: 106/61  MAP (mmHg): 78  Resp: 16  SpO2: 97 %    Temp  Min: 97.9 °F (36.6 °C)  Max: 98.2 °F (36.8 °C)  Pulse  Min: 69  Max: 111  BP  Min: 101/55  Max: 178/84  MAP (mmHg)  Min: 73  Max: 120  Resp  Min: 10  Max: 28  SpO2  Min: 91 %  Max: 100 %    12/08 0701 - 12/09 0700  In: 847 [I.V.:355.4]  Out: 800 [Urine:800]            Physical Exam  Constitutional:       Appearance: She is not toxic-appearing.   HENT:      Head: Normocephalic.      Nose: Nose normal.      Mouth/Throat:      Mouth: Mucous membranes are moist.   Eyes:      Pupils: Pupils are equal, round, and reactive to light.   Cardiovascular:      Rate and Rhythm: Normal rate and regular rhythm.      Heart sounds: No murmur heard.  Pulmonary:      Effort: Pulmonary effort is normal.      Breath sounds: No stridor. No wheezing.   Abdominal:      General: Abdomen is flat. There is no distension.      Palpations: Abdomen is soft.      Tenderness: There is no abdominal tenderness.   Musculoskeletal:         General: Normal range of motion.      Cervical back: Normal range of motion.      Right lower leg: No edema.      Left lower leg: No  edema.   Skin:     General: Skin is warm.   Neurological:      Mental Status: She is alert.      Comments: GCS 15  PEERL  5/5 BL UE and LE  EOMI  Sensation in tact  Cranial nerves in tact   Psychiatric:         Mood and Affect: Mood normal.         Behavior: Behavior normal.         Thought Content: Thought content normal.         Judgment: Judgment normal.              Medications:  Continuousnicardipine, Last Rate: Stopped (12/08/23 3012)    ScheduledamLODIPine, 5 mg, Daily  gabapentin, 300 mg, TID  levETIRAcetam, 500 mg, BID  methocarbamoL, 500 mg, QID  niMODipine, 60 mg, Q4H    PRNacetaminophen, 650 mg, Q4H PRN  labetalol, 10 mg, Q15 Min PRN  magnesium oxide, 800 mg, PRN  magnesium oxide, 800 mg, PRN  melatonin, 6 mg, Nightly PRN  ondansetron, 8 mg, Q8H PRN  oxyCODONE, 5 mg, Q4H PRN  potassium bicarbonate, 35 mEq, PRN  potassium bicarbonate, 50 mEq, PRN  potassium bicarbonate, 60 mEq, PRN  potassium, sodium phosphates, 2 packet, PRN  potassium, sodium phosphates, 2 packet, PRN  potassium, sodium phosphates, 2 packet, PRN      Today I personally reviewed pertinent medications, lines/drains/airways, imaging, cardiology results, laboratory results, microbiology results, notably:    Diet  Diet Adult Regular (IDDSI Level 7) Thin  Diet Adult Regular (IDDSI Level 7) Thin

## 2023-12-09 NOTE — PLAN OF CARE
Problem: SLP  Goal: SLP Goal  Description: Speech Language Pathology Goals  Goals expected to be met by 12/16:  1. Pt will demonstrate tolerance of regular solids with thin liquids without overt s/s airway threat  2. Pt will participate in assessment of speech/language/cognitive skills to determine future therapeutic plan of care.   Outcome: Ongoing, Progressing  SLP rec: Regular diet with thin liquids following safe swallow precautions. Continue ST per POC.  12/9/2023

## 2023-12-09 NOTE — PT/OT/SLP EVAL
"Occupational Therapy   Evaluation    Name: Laura Newell  MRN: [id number]  Admitting Diagnosis: Subarachnoid hemorrhage  Recent Surgery: * No surgery found *      Recommendations:     Discharge Recommendations: No Therapy Indicated  Discharge Equipment Recommendations:  none  Barriers to discharge:  None    Assessment:     Laura Newell is a 58 y.o. female with a medical diagnosis of Subarachnoid hemorrhage.  She presents with performance deficits affecting function: impaired self care skills, impaired functional mobility.      Rehab Prognosis: Good; patient would benefit from acute skilled OT services to address these deficits and reach maximum level of function.       Plan:     Patient to be seen 1 x/week to address the above listed problems via neuromuscular re-education, therapeutic activities, therapeutic exercises, self-care/home management  Plan of Care Expires: 01/06/24  Plan of Care Reviewed with: patient    Subjective     Patient:  "I was working out and then got an extreme headache that went down my neck."    Occupational Profile:  Patient resides in [place] with her  in one story home with no steps to enter.  Patient is right handed.  PTA patient independent with ADLs including driving. Currently owns no DME.  Patient assists in caring for her father who lives in [place] with 3 steps to enter his home.  Patient works: RN, Charge Master Nurse .  Hobbies: reading, working out.    Pain/Comfort:  Pain Rating 1: 0/10  Pain Rating Post-Intervention 1: 0/10    Patients cultural, spiritual, Anabaptist conflicts given the current situation: no    Objective:     Communicated with: Nurse prior to session.  Patient found supine with bed alarm, peripheral IV, telemetry, pulse ox (continuous), PureWick, blood pressure cuff upon OT entry to room.    General Precautions: Standard, fall  Orthopedic Precautions: N/A  Braces: N/A  Respiratory Status: Room air    Occupational Performance:    Bed Mobility:  "   Patient completed Rolling/Turning to Left with  modified independence  Patient completed Rolling/Turning to Right with modified independence  Patient completed Supine to Sit with modified independence  Patient completed Sit to Supine with modified independence    Functional Mobility/Transfers:  Modified independent with stand pivot transfers    Activities of Daily Living:  Grooming: modified independence    Upper Body Dressing: modified independence    Lower Body Dressing: modified independence      Cognitive/Visual Perceptual:  Cognitive/Psychosocial Skills:     -       Oriented to: Person, Place, Time, and Situation   -       Follows Commands/attention:Follows one-step commands  -       Communication: clear/fluent    Physical Exam:  Postural examination/scapula alignment:    -       Rounded shoulders  Upper Extremity Range of Motion:     -       Right Upper Extremity: WNL  -       Left Upper Extremity: WNL  Upper Extremity Strength:    -       Right Upper Extremity: WNL  -       Left Upper Extremity: WNL    AMPAC 6 Click ADL:  AMPAC Total Score: 24    Treatment & Education:  Patient alert and oriented x 3; able to follow 4/4 one step commands.  Patient attentive and interactive throughout the session.      Patient left supine with all lines intact, call button in reach, and bed alarm on    GOALS:   Multidisciplinary Problems       Occupational Therapy Goals          Problem: Occupational Therapy    Goal Priority Disciplines Outcome Interventions   Occupational Therapy Goal     OT, PT/OT Ongoing, Progressing    Description: Goals set 12/9 to be addressed for 14 days with expiration date, 12/23:  Patient will increase functional independence with ADLs by performing:  Patient will demonstrate stand pivot transfers with independence.   Not met  Patient will demonstrate grooming while standing with independence.   Not met  Patient will demonstrate upper body dressing with independence while seated EOB.   Not  met  Patient will demonstrate lower body dressing with independence while seated EOB.   Not met  Patient will demonstrate toileting with independence.   Not met  Patient will demonstrate bathing while seated EOB with independence.   Not met  Patient's family / caregiver will demonstrate independence and safety with assisting patient with self-care skills and functional mobility.     Not met                               History:     Past Medical History:   Diagnosis Date    Asthma     Lung collapse     Pneumothorax     spontaneous         Past Surgical History:   Procedure Laterality Date    APPENDECTOMY      bilateral groin hernia repair         Time Tracking:     OT Date of Treatment: 12/09/23  OT Start Time: 0510  OT Stop Time: 0528  OT Total Time (min): 18 min    Billable Minutes:Evaluation 10  Therapeutic Activity 8    12/9/2023

## 2023-12-09 NOTE — PROGRESS NOTES
[facility] - Neuro Critical Care  Neurosurgery  Progress Note    Subjective:     History of Present Illness: 59 yo female with history of HTN transferred from [facility] for NSGY evaluation of subarachnoid hemorrhage. She presented to the emergency room with complaint of acute onset headache while working out this am. States that she took Excedrin migraine which helped a little bit.  States that she has never had a headache like this before.  Patient's systolic blood pressures greater than 200 upon arrival.  CTH at OSH shows small hemorrhage along the anterior and right lateral aspects of the katiuska, no IVH. Pt is not on blood thinners.      Post-Op Info:  * No surgery found *       Interval History: 12/9: HA improving this AM, no n/v. Hypertensive initially. Pending angiogram.     Medications:  Continuous Infusions:   nicardipine Stopped (12/08/23 2322)     Scheduled Meds:   amLODIPine  5 mg Oral Daily    gabapentin  300 mg Oral TID    levETIRAcetam  500 mg Oral BID    methocarbamoL  500 mg Oral QID    niMODipine  60 mg Oral Q4H     PRN Meds:acetaminophen, labetalol, magnesium oxide, magnesium oxide, melatonin, ondansetron, oxyCODONE, potassium bicarbonate, potassium bicarbonate, potassium bicarbonate, potassium, sodium phosphates, potassium, sodium phosphates, potassium, sodium phosphates     Review of Systems  Objective:     Weight: 65.1 kg (143 lb 8.3 oz)  Body mass index is 24.64 kg/m².  Vital Signs (Most Recent):  Temp: 98.1 °F (36.7 °C) (12/09/23 0705)  Pulse: 72 (12/09/23 0825)  Resp: 16 (12/09/23 0705)  BP: (!) 125/58 (12/09/23 0705)  SpO2: 96 % (12/09/23 0825) Vital Signs (24h Range):  Temp:  [97.9 °F (36.6 °C)-98.2 °F (36.8 °C)] 98.1 °F (36.7 °C)  Pulse:  [] 72  Resp:  [10-28] 16  SpO2:  [91 %-100 %] 96 %  BP: (101-198)/(52-98) 125/58     Date 12/09/23 0700 - 12/10/23 0659   Shift 5361-9223 0873-3134 4417-2471 24 Hour Total   INTAKE   P.O. 400   400   Shift Total(mL/kg) 400(6.1)   400(6.1)    OUTPUT   Urine(mL/kg/hr) 900   900   Shift Total(mL/kg) 900(13.8)   900(13.8)   Weight (kg) 65.1 65.1 65.1 65.1                               Physical Exam         Neurosurgery Physical Exam    Physical Exam  General: well developed, well nourished, no distress.   Head: normocephalic, atraumatic  Neurologic: Alert and oriented. Thought content appropriate.  GCS: Motor: 6/Verbal: 5/Eyes: 4 GCS Total: 15  Mental Status: Awake, Alert, Oriented x3  Cranial nerves: face symmetric, tongue midline, CN II-XII grossly intact.   Eyes: pupils equal, round, reactive to light with accomodation, EOMI.   Sensory: intact to light touch throughout  Motor Strength:Moves all extremities spontaneously with good tone.  Full strength upper and lower extremities. No abnormal movements seen.   Pronator Drift: no drift noted  Finger-to-nose: Intact bilaterally  Mild nuchal rigidity     Significant Labs:  Recent Labs   Lab 12/08/23  0826 12/09/23  0202    108    136   K 4.3 3.9    109   CO2 25 18*   BUN 15 11   CREATININE 0.88 0.9   CALCIUM 9.4 8.9   MG  --  3.3*     Recent Labs   Lab 12/08/23  0826 12/08/23  1403 12/09/23  0202   WBC 4.80 7.35 8.13   HGB 13.8 12.1 13.2   HCT 41.0 33.9* 37.3    191 227     Recent Labs   Lab 12/08/23  1214   INR 1.0   APTT 27.0     Microbiology Results (last 7 days)       ** No results found for the last 168 hours. **          All pertinent labs from the last 24 hours have been reviewed.    Significant Diagnostics:  CT: CT Head Without Contrast    Result Date: 12/8/2023  Stable appearance of the subarachnoid hemorrhage.  No hydrocephalus. Nonspecific prominent white matter changes. 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 Patchy hypodense areas involving the  bilateral subcortical cerebral white matter, nonspecific and possibly reflecting chronic small vessel ischemic change. Findings discussed with Dr. Doss at the time of this dictation. Electronically signed by: Praveen Olivas MD Date:    12/08/2023 Time:    09:08   MRI: No results found in the last 24 hours.  Assessment/Plan:     * Subarachnoid hemorrhage  57 yo female with history of HTN who presents with nontraumatic prepontine subarachnoid hemorrhage, HH2F2    -Admit to [facility]  -Q1h neuro checks  -Imaging and Diagnostics reviewed  -CTA shows stable subarachnoid hemorrhage.  No hydrocephalus. No intracranial aneurysm or AVM is identified.  -Recommend cerebral angiogram with IR  -SBP<140  -HOB @30  -Keppra 500mg BID x 7 days  -TCDs daily  x 14d  -Nimotop 60q4h x 21d  -Eunatremic  -Continue to follow clinically and notify NSGY with any decline in neuro status. Further recommendations pending CTA  -Discussed with Dr. Ron Cason MD  Neurosurgery  [facility] - Neuro Critical Care

## 2023-12-09 NOTE — PROGRESS NOTES
[facility]  Neurocritical Care  Progress Note    Admit Date: 12/8/2023  Service Date: 12/09/2023  Length of Stay: 1    Subjective:     Chief Complaint: Subarachnoid hemorrhage    History of Present Illness: 57 y/o F w/ PMH migraines, anxiety, and HTN who presented to [facility] with SAH. Patient states that she was working out this morning when she developed a sudden, severe headache, described as among the worst of her life, which prompted her to come to the [facility] ED. According to patient, the headache's improved considerably after she took Excedrin and reduced her activity; if she sits with her eyes closed the pain is 2/10 in intensity and increases to 9/10 with movement. Upon presentation to [facility], patient's BP was measured into the 200s systolics. The patient states that she had a BP into 180s at her last visit with her general practitioner but was not prescribed medication because her pressures were in normal at home. 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 The patient will be admitted to [facility] for further neuro-monitoring and higher level of care.    Hospital Course: 12/9/23: Follow up CTH shows stable SAH. Likely to go for cerebral angiogram later this afternoon.    Interval History:  NAEO. Continues to experience headache and photophobia.  Follow up CTH shows stable SAH. Likely to go for cerebral angiogram later this afternoon.    Review of Systems   Constitutional:  Negative for fatigue and fever.   HENT: Negative.     Eyes:  Positive for photophobia.   Respiratory: Negative.     Cardiovascular:  Negative for chest pain and palpitations.   Gastrointestinal: Negative.    Endocrine: Negative.    Genitourinary: Negative.    Musculoskeletal: Negative.  Negative for neck stiffness.   Skin: Negative.    Neurological:  Positive for headaches. Negative for  dizziness, seizures, syncope, facial asymmetry, speech difficulty, weakness, light-headedness and numbness.   Psychiatric/Behavioral: Negative.         Objective:     Vitals:  Temp: 98.1 °F (36.7 °C)  Pulse: 70  Rhythm: normal sinus rhythm  BP: 106/61  MAP (mmHg): 78  Resp: 16  SpO2: 97 %    Temp  Min: 97.9 °F (36.6 °C)  Max: 98.2 °F (36.8 °C)  Pulse  Min: 69  Max: 111  BP  Min: 101/55  Max: 178/84  MAP (mmHg)  Min: 73  Max: 120  Resp  Min: 10  Max: 28  SpO2  Min: 91 %  Max: 100 %    12/08 0701 - 12/09 0700  In: 847 [I.V.:355.4]  Out: 800 [Urine:800]            Physical Exam  Constitutional:       Appearance: She is not toxic-appearing.   HENT:      Head: Normocephalic.      Nose: Nose normal.      Mouth/Throat:      Mouth: Mucous membranes are moist.   Eyes:      Pupils: Pupils are equal, round, and reactive to light.   Cardiovascular:      Rate and Rhythm: Normal rate and regular rhythm.      Heart sounds: No murmur heard.  Pulmonary:      Effort: Pulmonary effort is normal.      Breath sounds: No stridor. No wheezing.   Abdominal:      General: Abdomen is flat. There is no distension.      Palpations: Abdomen is soft.      Tenderness: There is no abdominal tenderness.   Musculoskeletal:         General: Normal range of motion.      Cervical back: Normal range of motion.      Right lower leg: No edema.      Left lower leg: No edema.   Skin:     General: Skin is warm.   Neurological:      Mental Status: She is alert.      Comments: GCS 15  PEERL  5/5 BL UE and LE  EOMI  Sensation in tact  Cranial nerves in tact   Psychiatric:         Mood and Affect: Mood normal.         Behavior: Behavior normal.         Thought Content: Thought content normal.         Judgment: Judgment normal.              Medications:  Continuousnicardipine, Last Rate: Stopped (12/08/23 2322)    ScheduledamLODIPine, 5 mg, Daily  gabapentin, 300 mg, TID  levETIRAcetam, 500 mg, BID  methocarbamoL, 500 mg, QID  niMODipine, 60 mg,  Q4H    PRNacetaminophen, 650 mg, Q4H PRN  labetalol, 10 mg, Q15 Min PRN  magnesium oxide, 800 mg, PRN  magnesium oxide, 800 mg, PRN  melatonin, 6 mg, Nightly PRN  ondansetron, 8 mg, Q8H PRN  oxyCODONE, 5 mg, Q4H PRN  potassium bicarbonate, 35 mEq, PRN  potassium bicarbonate, 50 mEq, PRN  potassium bicarbonate, 60 mEq, PRN  potassium, sodium phosphates, 2 packet, PRN  potassium, sodium phosphates, 2 packet, PRN  potassium, sodium phosphates, 2 packet, PRN      Today I personally reviewed pertinent medications, lines/drains/airways, imaging, cardiology results, laboratory results, microbiology results, notably:    Diet  Diet Adult Regular (IDDSI Level 7) Thin  Diet Adult Regular (IDDSI Level 7) Thin        Assessment/Plan:     Neuro  * Subarachnoid hemorrhage  59 y/o F w/ PMH migraines, anxiety, and HTN who presented to [facility] with SAH. Patient states that she was working out this morning when she developed a sudden, severe headache, described as among the worst of her life, which prompted her to come to the [facility] ED. According to patient, the headache's improved considerably after she took Excedrin and reduced her activity; if she sits with her eyes closed the pain is 2/10 in intensity and increases to 9/10 with movement. Upon presentation to [facility], patient's BP was measured into the 200s systolics. The patient states that she had a BP into 180s at her last visit with her general practitioner but was not prescribed medication because her pressures were in normal at home. 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 The patient will be admitted to [facility] for further neuro-monitoring and higher level of care.    Plan:  -Admit to [facility]  -NSGY and Vasc Neuro following  -SBP <140  -Daily TCDs  -Follow up CT 4PM today  -Daily CBC, CMP, Mg, Phos, Lipids  -Nimodipine 60 mg q4hrs   -Keppra  500 mg BID 7 days  -Q1 neurochecks   -Pain control  -Consider echocardiogram  -Possible cerebral angiogram w/ IR per NSGY    12/9: Will go for cerebral angiogram w/ neuro IR today to assess for presence of aneurysm or AVM    Cardiac/Vascular  Elevated cholesterol  Hx of elevated lipoproteins   Will get new lipid panel     Essential hypertension  Previously on Losartan-HCTZ for HTN. Has not been on medication for extended period of time due to reported normal home pressures.  Will keep SBP <140 post SAH. Currently on Cardene drip. Will add Labetolol and Hydralazine PRNs.     12/9: BP controlled of cardene drip. Amlodipine 5mg added.          The patient is being Prophylaxed for:  Venous Thromboembolism with: None  Stress Ulcer with: None  Ventilator Pneumonia with: not applicable    Activity Orders            Diet Adult Regular (IDDSI Level 7) Thin: Regular starting at 12/09 1109    Progressive Mobility Protocol (mobilize patient to their highest level of functioning at least twice daily) starting at 12/08 2000    Turn patient starting at 12/08 1400    Elevate HOB starting at 12/08 1324          Full Code    Car Valentine MD  Neurocritical Care  [facility] Neuro [facility]

## 2023-12-10 PROBLEM — I61.9 CEREBRAL BRAIN HEMORRHAGE: Status: ACTIVE | Noted: 2023-12-10

## 2023-12-10 PROBLEM — R51.9 CEPHALALGIA: Status: ACTIVE | Noted: 2023-12-10

## 2023-12-10 LAB
ALBUMIN SERPL BCP-MCNC: 3.4 G/DL (ref 3.5–5.2)
ALP SERPL-CCNC: 76 U/L (ref 55–135)
ALT SERPL W/O P-5'-P-CCNC: 14 U/L (ref 10–44)
ANION GAP SERPL CALC-SCNC: 7 MMOL/L (ref 8–16)
AST SERPL-CCNC: 14 U/L (ref 10–40)
BASOPHILS # BLD AUTO: 0.06 K/UL (ref 0–0.2)
BASOPHILS NFR BLD: 1.1 % (ref 0–1.9)
BILIRUB SERPL-MCNC: 0.8 MG/DL (ref 0.1–1)
BUN SERPL-MCNC: 12 MG/DL (ref 6–20)
CALCIUM SERPL-MCNC: 8.9 MG/DL (ref 8.7–10.5)
CHLORIDE SERPL-SCNC: 109 MMOL/L (ref 95–110)
CO2 SERPL-SCNC: 21 MMOL/L (ref 23–29)
CREAT SERPL-MCNC: 0.9 MG/DL (ref 0.5–1.4)
DIFFERENTIAL METHOD: ABNORMAL
EOSINOPHIL # BLD AUTO: 0.2 K/UL (ref 0–0.5)
EOSINOPHIL NFR BLD: 4 % (ref 0–8)
ERYTHROCYTE [DISTWIDTH] IN BLOOD BY AUTOMATED COUNT: 12.6 % (ref 11.5–14.5)
EST. GFR  (NO RACE VARIABLE): >60 ML/MIN/1.73 M^2
GLUCOSE SERPL-MCNC: 111 MG/DL (ref 70–110)
HCT VFR BLD AUTO: 39.1 % (ref 37–48.5)
HGB BLD-MCNC: 13.1 G/DL (ref 12–16)
IMM GRANULOCYTES # BLD AUTO: 0.01 K/UL (ref 0–0.04)
IMM GRANULOCYTES NFR BLD AUTO: 0.2 % (ref 0–0.5)
LYMPHOCYTES # BLD AUTO: 2 K/UL (ref 1–4.8)
LYMPHOCYTES NFR BLD: 36.1 % (ref 18–48)
MAGNESIUM SERPL-MCNC: 1.9 MG/DL (ref 1.6–2.6)
MCH RBC QN AUTO: 30.8 PG (ref 27–31)
MCHC RBC AUTO-ENTMCNC: 33.5 G/DL (ref 32–36)
MCV RBC AUTO: 92 FL (ref 82–98)
MONOCYTES # BLD AUTO: 0.5 K/UL (ref 0.3–1)
MONOCYTES NFR BLD: 8.8 % (ref 4–15)
NEUTROPHILS # BLD AUTO: 2.7 K/UL (ref 1.8–7.7)
NEUTROPHILS NFR BLD: 49.8 % (ref 38–73)
NRBC BLD-RTO: 0 /100 WBC
PHOSPHATE SERPL-MCNC: 2.9 MG/DL (ref 2.7–4.5)
PLATELET # BLD AUTO: 266 K/UL (ref 150–450)
PMV BLD AUTO: 9.1 FL (ref 9.2–12.9)
POTASSIUM SERPL-SCNC: 3.8 MMOL/L (ref 3.5–5.1)
PROT SERPL-MCNC: 6.5 G/DL (ref 6–8.4)
RBC # BLD AUTO: 4.26 M/UL (ref 4–5.4)
SODIUM SERPL-SCNC: 137 MMOL/L (ref 136–145)
WBC # BLD AUTO: 5.46 K/UL (ref 3.9–12.7)

## 2023-12-10 PROCEDURE — 85025 COMPLETE CBC W/AUTO DIFF WBC: CPT

## 2023-12-10 PROCEDURE — 99233 PR SUBSEQUENT HOSPITAL CARE,LEVL III: ICD-10-PCS | Mod: FS,,, | Performed by: NEUROLOGICAL SURGERY

## 2023-12-10 PROCEDURE — 80053 COMPREHEN METABOLIC PANEL: CPT

## 2023-12-10 PROCEDURE — 94761 N-INVAS EAR/PLS OXIMETRY MLT: CPT

## 2023-12-10 PROCEDURE — 25000003 PHARM REV CODE 250

## 2023-12-10 PROCEDURE — 25000003 PHARM REV CODE 250: Performed by: NURSE PRACTITIONER

## 2023-12-10 PROCEDURE — 99499 NO LOS: ICD-10-PCS | Mod: ,,, | Performed by: NURSE PRACTITIONER

## 2023-12-10 PROCEDURE — 83735 ASSAY OF MAGNESIUM: CPT

## 2023-12-10 PROCEDURE — 84100 ASSAY OF PHOSPHORUS: CPT

## 2023-12-10 PROCEDURE — 25000003 PHARM REV CODE 250: Performed by: PSYCHIATRY & NEUROLOGY

## 2023-12-10 PROCEDURE — 20000000 HC ICU ROOM

## 2023-12-10 PROCEDURE — 97161 PT EVAL LOW COMPLEX 20 MIN: CPT

## 2023-12-10 PROCEDURE — 99291 PR CRITICAL CARE, E/M 30-74 MINUTES: ICD-10-PCS | Mod: FS,,, | Performed by: PSYCHIATRY & NEUROLOGY

## 2023-12-10 PROCEDURE — 99499 UNLISTED E&M SERVICE: CPT | Mod: ,,, | Performed by: NURSE PRACTITIONER

## 2023-12-10 PROCEDURE — 99233 SBSQ HOSP IP/OBS HIGH 50: CPT | Mod: FS,,, | Performed by: NEUROLOGICAL SURGERY

## 2023-12-10 PROCEDURE — 99291 CRITICAL CARE FIRST HOUR: CPT | Mod: FS,,, | Performed by: PSYCHIATRY & NEUROLOGY

## 2023-12-10 RX ADMIN — LEVETIRACETAM 500 MG: 500 TABLET, FILM COATED ORAL at 09:12

## 2023-12-10 RX ADMIN — OXYCODONE HYDROCHLORIDE 5 MG: 5 TABLET ORAL at 03:12

## 2023-12-10 RX ADMIN — METHOCARBAMOL 500 MG: 500 TABLET ORAL at 08:12

## 2023-12-10 RX ADMIN — METHOCARBAMOL 500 MG: 500 TABLET ORAL at 09:12

## 2023-12-10 RX ADMIN — NIMODIPINE 60 MG: 30 CAPSULE, LIQUID FILLED ORAL at 09:12

## 2023-12-10 RX ADMIN — GABAPENTIN 300 MG: 300 CAPSULE ORAL at 09:12

## 2023-12-10 RX ADMIN — ACETAMINOPHEN 650 MG: 325 TABLET ORAL at 08:12

## 2023-12-10 RX ADMIN — GABAPENTIN 300 MG: 300 CAPSULE ORAL at 08:12

## 2023-12-10 RX ADMIN — ACETAMINOPHEN 650 MG: 325 TABLET ORAL at 06:12

## 2023-12-10 RX ADMIN — NIMODIPINE 60 MG: 30 CAPSULE, LIQUID FILLED ORAL at 02:12

## 2023-12-10 RX ADMIN — AMLODIPINE BESYLATE 5 MG: 5 TABLET ORAL at 08:12

## 2023-12-10 RX ADMIN — NIMODIPINE 60 MG: 30 CAPSULE, LIQUID FILLED ORAL at 06:12

## 2023-12-10 RX ADMIN — LEVETIRACETAM 500 MG: 500 TABLET, FILM COATED ORAL at 08:12

## 2023-12-10 RX ADMIN — METHOCARBAMOL 500 MG: 500 TABLET ORAL at 05:12

## 2023-12-10 RX ADMIN — NIMODIPINE 60 MG: 30 CAPSULE, LIQUID FILLED ORAL at 01:12

## 2023-12-10 RX ADMIN — OXYCODONE HYDROCHLORIDE 5 MG: 5 TABLET ORAL at 01:12

## 2023-12-10 RX ADMIN — GABAPENTIN 300 MG: 300 CAPSULE ORAL at 02:12

## 2023-12-10 RX ADMIN — NIMODIPINE 60 MG: 30 CAPSULE, LIQUID FILLED ORAL at 05:12

## 2023-12-10 RX ADMIN — ONDANSETRON 8 MG: 8 TABLET, ORALLY DISINTEGRATING ORAL at 11:12

## 2023-12-10 RX ADMIN — SODIUM CHLORIDE 1000 ML: 9 INJECTION, SOLUTION INTRAVENOUS at 08:12

## 2023-12-10 NOTE — PLAN OF CARE
"[facility] Care Plan    POC reviewed with Laura Newell and family at 1400. Pt verbalized understanding. Questions and concerns addressed. No acute events today. Pt progressing toward goals. Will continue to monitor. See below and flowsheets for full assessment and VS info.     -daily TCD completed  -echo completed  -SLP with patient  -headache mgmt with PRN meds  -possible angiogram tmr, NPO at midnight        Is this a stroke patient? yes- Stroke booklet reviewed with patient and family, risk factors identified for patient and stroke booklet remains at bedside for ongoing education.     Neuro:  [place] Coma Scale  Best Eye Response: 4-->(E4) spontaneous  Best Motor Response: 6-->(M6) obeys commands  Best Verbal Response: 5-->(V5) oriented  Balbir Coma Scale Score: 15  Assessment Qualifiers: patient not sedated/intubated  Pupil PERRLA: yes     24 hr Temp:  [98 °F (36.7 °C)-98.5 °F (36.9 °C)]     CV:   Rhythm: normal sinus rhythm  BP goals:   SBP < 140  MAP > 65    Resp:           Plan: N/A    GI/:     Diet/Nutrition Received: NPO  Last Bowel Movement: 12/08/23  Voiding Characteristics: voids spontaneously without difficulty    Intake/Output Summary (Last 24 hours) at 12/9/2023 1823  Last data filed at 12/9/2023 1805  Gross per 24 hour   Intake 1846.96 ml   Output 2000 ml   Net -153.04 ml          Labs/Accuchecks:  Recent Labs   Lab 12/09/23  0202   WBC 8.13   RBC 4.30   HGB 13.2   HCT 37.3         Recent Labs   Lab 12/09/23  0202      K 3.9   CO2 18*      BUN 11   CREATININE 0.9   ALKPHOS 81   ALT 15   AST 18   BILITOT 1.0      Recent Labs   Lab 12/08/23  1214   INR 1.0   APTT 27.0    No results for input(s): "CPK", "CPKMB", "TROPONINI", "MB" in the last 168 hours.    Electrolytes: N/A - electrolytes WDL  Accuchecks: Q6H    Gtts:   nicardipine Stopped (12/08/23 2322)       LDA/Wounds:  Lines/Drains/Airways       Peripheral Intravenous Line  Duration                  Peripheral IV - Single Lumen " 12/08/23 0931 20 G Anterior;Left;Proximal Forearm 1 day         Peripheral IV - Single Lumen 12/08/23 2037 18 G Right Antecubital <1 day                  Wounds: No  Wound care consulted: No

## 2023-12-10 NOTE — SUBJECTIVE & OBJECTIVE
Interval History:  1 liter bolus, possible angio today    Review of Systems   Reason unable to perform ROS: decrease LOC.   Constitutional: Negative.    HENT: Negative.     Eyes: Negative.    Respiratory: Negative.     Cardiovascular: Negative.    Gastrointestinal: Negative.    Endocrine: Negative.    Genitourinary: Negative.    Musculoskeletal: Negative.    Skin: Negative.    Neurological:  Positive for headaches.      Unable to obtain a complete ROS due to level of consciousness.  Objective:     Vitals:  Temp: 98.2 °F (36.8 °C)  Pulse: 75  Rhythm: normal sinus rhythm  BP: (!) 113/59  MAP (mmHg): 82  Resp: 16  SpO2: 97 %    Temp  Min: 98.2 °F (36.8 °C)  Max: 98.6 °F (37 °C)  Pulse  Min: 66  Max: 86  BP  Min: 98/55  Max: 141/69  MAP (mmHg)  Min: 73  Max: 102  Resp  Min: 13  Max: 22  SpO2  Min: 93 %  Max: 100 %    12/09 0701 - 12/10 0700  In: 1850 [P.O.:1850]  Out: 2900 [Urine:2900]            Physical Exam  Vitals and nursing note reviewed.   Constitutional:       Appearance: She is ill-appearing.   HENT:      Head: Normocephalic.      Nose: Nose normal.      Mouth/Throat:      Mouth: Mucous membranes are moist.      Pharynx: Oropharynx is clear.   Eyes:      Pupils: Pupils are equal, round, and reactive to light.   Cardiovascular:      Rate and Rhythm: Normal rate and regular rhythm.      Pulses: Normal pulses.      Heart sounds: Normal heart sounds.   Pulmonary:      Effort: Pulmonary effort is normal.      Breath sounds: Normal breath sounds.   Abdominal:      General: Bowel sounds are normal.      Palpations: Abdomen is soft.   Skin:     General: Skin is warm and dry.      Capillary Refill: Capillary refill takes 2 to 3 seconds.   Neurological:      Comments: GCS 15  PEERL  5/5 BL UE and LE  EOMI  Sensation in tact  Cranial nerves intact            Medications:  Continuousnicardipine, Last Rate: Stopped (12/08/23 3812)    ScheduledamLODIPine, 5 mg, Daily  gabapentin, 300 mg, TID  levETIRAcetam, 500 mg,  BID  methocarbamoL, 500 mg, QID  niMODipine, 60 mg, Q4H    PRNacetaminophen, 650 mg, Q4H PRN  labetalol, 10 mg, Q15 Min PRN  magnesium oxide, 800 mg, PRN  magnesium oxide, 800 mg, PRN  melatonin, 6 mg, Nightly PRN  ondansetron, 8 mg, Q8H PRN  oxyCODONE, 5 mg, Q4H PRN  potassium bicarbonate, 35 mEq, PRN  potassium bicarbonate, 50 mEq, PRN  potassium bicarbonate, 60 mEq, PRN  potassium, sodium phosphates, 2 packet, PRN  potassium, sodium phosphates, 2 packet, PRN  potassium, sodium phosphates, 2 packet, PRN      Today I personally reviewed pertinent medications, lines/drains/airways, imaging, cardiology results, laboratory results, microbiology results, notably:    Diet  Diet NPO  Diet NPO

## 2023-12-10 NOTE — CONSULTS
Inpatient consult to Physical Medicine Rehab  Consult performed by: Cande Fontana NP  Consult ordered by: Car Singh MD  Reason for consult: Assesss rehab needs      Reviewed patient history and current admission.  Rehab team following.  Full consult to follow.    MANSOOR Patrick, FNP-C  Physical Medicine & Rehabilitation   12/10/2023

## 2023-12-10 NOTE — SUBJECTIVE & OBJECTIVE
Interval History: 12/10 naeon, angiogram today.    Medications:  Continuous Infusions:   nicardipine Stopped (12/08/23 2322)     Scheduled Meds:   amLODIPine  5 mg Oral Daily    gabapentin  300 mg Oral TID    levETIRAcetam  500 mg Oral BID    methocarbamoL  500 mg Oral QID    niMODipine  60 mg Oral Q4H     PRN Meds:acetaminophen, labetalol, magnesium oxide, magnesium oxide, melatonin, ondansetron, oxyCODONE, potassium bicarbonate, potassium bicarbonate, potassium bicarbonate, potassium, sodium phosphates, potassium, sodium phosphates, potassium, sodium phosphates     Review of Systems  Objective:     Weight: 64.9 kg (143 lb)  Body mass index is 24.55 kg/m².  Vital Signs (Most Recent):  Temp: 98.3 °F (36.8 °C) (12/10/23 0705)  Pulse: 72 (12/10/23 0905)  Resp: 18 (12/10/23 0905)  BP: 121/71 (12/10/23 0905)  SpO2: 97 % (12/10/23 0905) Vital Signs (24h Range):  Temp:  [98.2 °F (36.8 °C)-98.6 °F (37 °C)] 98.3 °F (36.8 °C)  Pulse:  [65-86] 72  Resp:  [11-22] 18  SpO2:  [93 %-100 %] 97 %  BP: ()/(52-77) 121/71     Date 12/10/23 0700 - 12/11/23 0659   Shift 1580-0202 8340-7212 5839-5133 24 Hour Total   INTAKE   P.O. 50   50   IV Piggyback 645.5   645.5   Shift Total(mL/kg) 695.5(10.7)   695.5(10.7)   OUTPUT   Urine(mL/kg/hr) 300   300   Shift Total(mL/kg) 300(4.6)   300(4.6)   Weight (kg) 64.9 64.9 64.9 64.9                                 Physical Exam         Neurosurgery Physical Exam    Physical Exam  General: well developed, well nourished, no distress.   Head: normocephalic, atraumatic  Neurologic: Alert and oriented. Thought content appropriate.  GCS: Motor: 6/Verbal: 5/Eyes: 4 GCS Total: 15  Mental Status: Awake, Alert, Oriented x3  Cranial nerves: face symmetric, tongue midline, CN II-XII grossly intact.   Eyes: pupils equal, round, reactive to light with accomodation, EOMI.   Sensory: intact to light touch throughout  Motor Strength:Moves all extremities spontaneously with good tone.  Full strength upper and  lower extremities. No abnormal movements seen.   Pronator Drift: no drift noted  Finger-to-nose: Intact bilaterally  Mild nuchal rigidity     Significant Labs:  Recent Labs   Lab 12/09/23  0202 12/10/23  0207    111*    137   K 3.9 3.8    109   CO2 18* 21*   BUN 11 12   CREATININE 0.9 0.9   CALCIUM 8.9 8.9   MG 3.3* 1.9       Recent Labs   Lab 12/08/23  1403 12/09/23  0202 12/10/23  0207   WBC 7.35 8.13 5.46   HGB 12.1 13.2 13.1   HCT 33.9* 37.3 39.1    227 266       Recent Labs   Lab 12/08/23  1214   INR 1.0   APTT 27.0       Microbiology Results (last 7 days)       ** No results found for the last 168 hours. **          All pertinent labs from the last 24 hours have been reviewed.    Significant Diagnostics:  CT: CT Head Without Contrast    Result Date: 12/8/2023  Stable appearance of the subarachnoid hemorrhage.  No hydrocephalus. Nonspecific prominent white matter changes. 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 Patchy hypodense areas involving the bilateral subcortical cerebral white matter, nonspecific and possibly reflecting chronic small vessel ischemic change. Findings discussed with Dr. Doss at the time of this dictation. Electronically signed by: Praveen Olivas MD Date:    12/08/2023 Time:    09:08   MRI: No results found in the last 24 hours.

## 2023-12-10 NOTE — PROGRESS NOTES
[facility] Neuro Critical Care  Neurocritical Care  Progress Note    Admit Date: 12/8/2023  Service Date: 12/10/2023  Length of Stay: 2    Subjective:     Chief Complaint: Nontraumatic subarachnoid hemorrhage    History of Present Illness: 57 y/o F w/ PMH migraines, anxiety, and HTN who presented to [facility] with SAH. Patient states that she was working out this morning when she developed a sudden, severe headache, described as among the worst of her life, which prompted her to come to the [facility] ED. According to patient, the headache's improved considerably after she took Excedrin and reduced her activity; if she sits with her eyes closed the pain is 2/10 in intensity and increases to 9/10 with movement. Upon presentation to [facility], patient's BP was measured into the 200s systolics. The patient states that she had a BP into 180s at her last visit with her general practitioner but was not prescribed medication because her pressures were in normal at home. 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 The patient will be admitted to [facility] for further neuro-monitoring and higher level of care.    Hospital Course: 12/9/23: Follow up CTH shows stable SAH. Likely to go for cerebral angiogram later this afternoon.  12/10/23: 1 liter bolus    Interval History:  1 liter bolus, possible angio today    Review of Systems   Reason unable to perform ROS: decrease LOC.   Constitutional: Negative.    HENT: Negative.     Eyes: Negative.    Respiratory: Negative.     Cardiovascular: Negative.    Gastrointestinal: Negative.    Endocrine: Negative.    Genitourinary: Negative.    Musculoskeletal: Negative.    Skin: Negative.    Neurological:  Positive for headaches.      Unable to obtain a complete ROS due to level of consciousness.  Objective:     Vitals:  Temp: 98.2 °F (36.8 °C)  Pulse: 75  Rhythm:  normal sinus rhythm  BP: (!) 113/59  MAP (mmHg): 82  Resp: 16  SpO2: 97 %    Temp  Min: 98.2 °F (36.8 °C)  Max: 98.6 °F (37 °C)  Pulse  Min: 66  Max: 86  BP  Min: 98/55  Max: 141/69  MAP (mmHg)  Min: 73  Max: 102  Resp  Min: 13  Max: 22  SpO2  Min: 93 %  Max: 100 %    12/09 0701 - 12/10 0700  In: 1850 [P.O.:1850]  Out: 2900 [Urine:2900]            Physical Exam  Vitals and nursing note reviewed.   Constitutional:       Appearance: She is ill-appearing.   HENT:      Head: Normocephalic.      Nose: Nose normal.      Mouth/Throat:      Mouth: Mucous membranes are moist.      Pharynx: Oropharynx is clear.   Eyes:      Pupils: Pupils are equal, round, and reactive to light.   Cardiovascular:      Rate and Rhythm: Normal rate and regular rhythm.      Pulses: Normal pulses.      Heart sounds: Normal heart sounds.   Pulmonary:      Effort: Pulmonary effort is normal.      Breath sounds: Normal breath sounds.   Abdominal:      General: Bowel sounds are normal.      Palpations: Abdomen is soft.   Skin:     General: Skin is warm and dry.      Capillary Refill: Capillary refill takes 2 to 3 seconds.   Neurological:      Comments: GCS 15  PEERL  5/5 BL UE and LE  EOMI  Sensation in tact  Cranial nerves intact            Medications:  Continuousnicardipine, Last Rate: Stopped (12/08/23 2322)    ScheduledamLODIPine, 5 mg, Daily  gabapentin, 300 mg, TID  levETIRAcetam, 500 mg, BID  methocarbamoL, 500 mg, QID  niMODipine, 60 mg, Q4H    PRNacetaminophen, 650 mg, Q4H PRN  labetalol, 10 mg, Q15 Min PRN  magnesium oxide, 800 mg, PRN  magnesium oxide, 800 mg, PRN  melatonin, 6 mg, Nightly PRN  ondansetron, 8 mg, Q8H PRN  oxyCODONE, 5 mg, Q4H PRN  potassium bicarbonate, 35 mEq, PRN  potassium bicarbonate, 50 mEq, PRN  potassium bicarbonate, 60 mEq, PRN  potassium, sodium phosphates, 2 packet, PRN  potassium, sodium phosphates, 2 packet, PRN  potassium, sodium phosphates, 2 packet, PRN      Today I personally reviewed pertinent  medications, lines/drains/airways, imaging, cardiology results, laboratory results, microbiology results, notably:    Diet  Diet NPO  Diet NPO        Assessment/Plan:     Neuro  * Nontraumatic subarachnoid hemorrhage  57 y/o F w/ PMH migraines, anxiety, and HTN who presented to [facility] with SAH. Patient states that she was working out this morning when she developed a sudden, severe headache, described as among the worst of her life, which prompted her to come to the [facility] ED. According to patient, the headache's improved considerably after she took Excedrin and reduced her activity; if she sits with her eyes closed the pain is 2/10 in intensity and increases to 9/10 with movement. Upon presentation to [facility], patient's BP was measured into the 200s systolics. The patient states that she had a BP into 180s at her last visit with her general practitioner but was not prescribed medication because her pressures were in normal at home. 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 The patient will be admitted to [facility] for further neuro-monitoring and higher level of care.    Plan:  -Admit to [facility]  -NSGY and [facility] Neuro following  -SBP <140  -Daily TCDs  -Follow up CT 4PM today  -Daily CBC, CMP, Mg, Phos, Lipids  -Nimodipine 60 mg q4hrs   -Keppra 500 mg BID 7 days  -Q1 neurochecks   -Pain control  -Consider echocardiogram  -Possible cerebral angiogram w/ IR per NSGY    12/9: Will go for cerebral angiogram w/ neuro IR today to assess for presence of aneurysm or AVM  12/10: angio today    Cardiac/Vascular  Elevated cholesterol  Hx of elevated lipoproteins   Will get new lipid panel     Essential hypertension  Previously on Losartan-HCTZ for HTN. Has not been on medication for extended period of time due to reported normal home pressures.  Will keep SBP <140 post SAH. Currently on Cardene  drip. Will add Labetolol and Hydralazine PRNs.     12/9: BP controlled of cardene drip. Amlodipine 5mg added.          The patient is being Prophylaxed for:  Venous Thromboembolism with: Mechanical or Chemical  Stress Ulcer with: Not Applicable   Ventilator Pneumonia with: not applicable    Activity Orders            Diet NPO: NPO starting at 12/10 0001    Progressive Mobility Protocol (mobilize patient to their highest level of functioning at least twice daily) starting at 12/08 2000    Turn patient starting at 12/08 1400    Elevate HOB starting at 12/08 1324          Full Code  Critical care time 40 mins  Danii Stack NP  Neurocritical Care  [facility] - Neuro Critical Care

## 2023-12-10 NOTE — PT/OT/SLP EVAL
"Physical Therapy Evaluation and Discharge Note    Patient Name:  Laura Newell   MRN:  [id number]  Admitting Diagnosis:  Nontraumatic subarachnoid hemorrhage   Recent Surgery: Procedure(s) (LRB):  ANGIOGRAM-CEREBRAL; Need Anesthesia; Dr. Byron Iniguez (N/A) Day of Surgery  Admit Date: 12/8/2023  Length of Stay: 2 days    Recommendations:     Discharge Recommendations:  No Therapy Indicated   Discharge Equipment Recommendations: none   Justification for Equipment: N/A  Barriers to discharge: None    Assessment:     Laura Newell is a 58 y.o. female admitted with a medical diagnosis of Nontraumatic subarachnoid hemorrhage. .  At this time, patient is functioning at their prior level of function and does not require further acute PT services.     Highest level of mobility achieved this visit: ambulates 20ft w/ HHA and CGA    Treatment Tolerated: Well    Plan:     During this hospitalization, patient does not require further acute PT services.  Please re-consult if situation changes.      Subjective     ISAIAS Drew notified prior to session. Pt's  present upon PT entrance into room.    Chief Complaint: light sensitivity  Patient/Family Comments/goals: "I no longer have pain in my head like I did"  Pain/Comfort:  Pain Rating 1: 0/10    Social History:  Residence: lives with their spouse 1-story house with 0 MARTHA.  Support available: Spouse  Equipment Used: none  Equipment Owned (not using): None  Prior level of function: independent  Work: Employed.   Drive: yes.     Objective:     Additional staff present: none    Patient found left sidelying with bed alarm, telemetry, pulse ox (continuous), blood pressure cuff upon PT entry to room.    General Precautions: Standard, fall   Orthopedic Precautions:N/A   Braces: N/A   Body mass index is 24.55 kg/m².  Oxygen Device: Room Air  Vitals: /63 (BP Location: Left arm, Patient Position: Lying)   Pulse 70   Temp 97.6 °F (36.4 °C) (Axillary)   Resp 18   Ht 5' 4" (1.626 m)  "  Wt 64.9 kg (143 lb)   SpO2 97%   BMI 24.55 kg/m²     Exam:  Cognition:   Alert and Cooperative  Command following: Follows two-step verbal commands  Fluency: clear/fluent  Skin Integrity: Visible skin intact  Edema: None noted  Sensation: Intact to light touch  Hearing: Intact  Vision:  Intact  Coordination: grossly intact  Range of Motion:  RLE: WFL  LLE: WFL  Strength Exam:  Bilateral Lower Extremity Strength: grossly WFL    Outcome Measures:  AM-PAC 6 CLICK MOBILITY  Turning over in bed (including adjusting bedclothes, sheets and blankets)?: 4  Sitting down on and standing up from a chair with arms (e.g., wheelchair, bedside commode, etc.): 4  Moving from lying on back to sitting on the side of the bed?: 4  Moving to and from a bed to a chair (including a wheelchair)?: 4  Need to walk in hospital room?: 3  Climbing 3-5 steps with a railing?: 3  Basic Mobility Total Score: 22     Functional Mobility:    Bed Mobility:   Rolling/Turning to Left: independence  Supine to Sit: supervision; from R side of bed  Scooting anteriorly to EOB to have both feet planted on floor: supervision  Sit to Supine: supervision; to L side of bed    Sitting Balance at Edge of Bed:  Assistance Level Required: Supervision  Time: 5 min  Postural deviations noted: no deviations noted    Transfers:   Sit <> Stand Transfer: contact guard assistance with no assistive device   Stand <> Sit Transfer: contact guard assistance with no assistive device   [id number] from EOB    Standing Balance:  Assistance Level Required: Contact Guard Assistance  Patient used: no assistive device   Time: 5 min  Postural deviations noted: no deviations noted    Gait:   Patient ambulated: 20ft   Patient required: contact guard  Patient used:  no assistive device   Gait Pattern observed: swing-through gait  Gait Deviation(s): occasional unsteady gait  Impairments due to: impaired balance  all lines remained intact throughout ambulation trial  Comments: pt does  everything with eyes closed due to sensitivity    Education:  Time provided for education, counseling and discussion of health disposition in regards to patient's current status  Pt educated on proper body mechanics, safety techniques, and energy conservation with PT facilitation and cueing throughout session    Patient left HOB elevated with all lines intact, call button in reach, and  present.    History:     Past Medical History:   Diagnosis Date    Asthma     Lung collapse     Pneumothorax     spontaneous       Past Surgical History:   Procedure Laterality Date    APPENDECTOMY      bilateral groin hernia repair         Time Tracking:     PT Received On: 12/10/23  PT Start Time: 1102     PT Stop Time: 1112  PT Total Time (min): 10 min     Billable Minutes: Evaluation 1 procedure    Filemon West PT, DPT  12/10/2023

## 2023-12-10 NOTE — PROGRESS NOTES
[facility] - Neuro Critical Care  Neurosurgery  Progress Note    Subjective:     History of Present Illness: 59 yo female with history of HTN transferred from [facility] for NSGY evaluation of subarachnoid hemorrhage. She presented to the emergency room with complaint of acute onset headache while working out this am. States that she took Excedrin migraine which helped a little bit.  States that she has never had a headache like this before.  Patient's systolic blood pressures greater than 200 upon arrival.  CTH at OSH shows small hemorrhage along the anterior and right lateral aspects of the katiuska, no IVH. Pt is not on blood thinners.      Post-Op Info:  Procedure(s) (LRB):  ANGIOGRAM-CEREBRAL; Need Anesthesia; Dr. Byron Iniguez (N/A)       Interval History: 12/10 kasie, angiogram today.    Medications:  Continuous Infusions:   nicardipine Stopped (12/08/23 2322)     Scheduled Meds:   amLODIPine  5 mg Oral Daily    gabapentin  300 mg Oral TID    levETIRAcetam  500 mg Oral BID    methocarbamoL  500 mg Oral QID    niMODipine  60 mg Oral Q4H     PRN Meds:acetaminophen, labetalol, magnesium oxide, magnesium oxide, melatonin, ondansetron, oxyCODONE, potassium bicarbonate, potassium bicarbonate, potassium bicarbonate, potassium, sodium phosphates, potassium, sodium phosphates, potassium, sodium phosphates     Review of Systems  Objective:     Weight: 64.9 kg (143 lb)  Body mass index is 24.55 kg/m².  Vital Signs (Most Recent):  Temp: 98.3 °F (36.8 °C) (12/10/23 0705)  Pulse: 72 (12/10/23 0905)  Resp: 18 (12/10/23 0905)  BP: 121/71 (12/10/23 0905)  SpO2: 97 % (12/10/23 0905) Vital Signs (24h Range):  Temp:  [98.2 °F (36.8 °C)-98.6 °F (37 °C)] 98.3 °F (36.8 °C)  Pulse:  [65-86] 72  Resp:  [11-22] 18  SpO2:  [93 %-100 %] 97 %  BP: ()/(52-77) 121/71     Date 12/10/23 0700 - 12/11/23 0659   Shift 5344-4960 3065-1798 0800-3761 24 Hour Total   INTAKE   P.O. 50   50   IV Piggyback 645.5   645.5   Shift Total(mL/kg)  695.5(10.7)   695.5(10.7)   OUTPUT   Urine(mL/kg/hr) 300   300   Shift Total(mL/kg) 300(4.6)   300(4.6)   Weight (kg) 64.9 64.9 64.9 64.9                                Physical Exam         Neurosurgery Physical Exam    Physical Exam  General: well developed, well nourished, no distress.   Head: normocephalic, atraumatic  Neurologic: Alert and oriented. Thought content appropriate.  GCS: Motor: 6/Verbal: 5/Eyes: 4 GCS Total: 15  Mental Status: Awake, Alert, Oriented x3  Cranial nerves: face symmetric, tongue midline, CN II-XII grossly intact.   Eyes: pupils equal, round, reactive to light with accomodation, EOMI.   Sensory: intact to light touch throughout  Motor Strength:Moves all extremities spontaneously with good tone.  Full strength upper and lower extremities. No abnormal movements seen.   Pronator Drift: no drift noted  Finger-to-nose: Intact bilaterally  Mild nuchal rigidity     Significant Labs:  Recent Labs   Lab 12/09/23  0202 12/10/23  0207    111*    137   K 3.9 3.8    109   CO2 18* 21*   BUN 11 12   CREATININE 0.9 0.9   CALCIUM 8.9 8.9   MG 3.3* 1.9       Recent Labs   Lab 12/08/23  1403 12/09/23  0202 12/10/23  0207   WBC 7.35 8.13 5.46   HGB 12.1 13.2 13.1   HCT 33.9* 37.3 39.1    227 266       Recent Labs   Lab 12/08/23  1214   INR 1.0   APTT 27.0       Microbiology Results (last 7 days)       ** No results found for the last 168 hours. **          All pertinent labs from the last 24 hours have been reviewed.    Significant Diagnostics:  CT: CT Head Without Contrast    Result Date: 12/8/2023  Stable appearance of the subarachnoid hemorrhage.  No hydrocephalus. Nonspecific prominent white matter changes. 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 Patchy  hypodense areas involving the bilateral subcortical cerebral white matter, nonspecific and possibly reflecting chronic small vessel ischemic change. Findings discussed with Dr. Doss at the time of this dictation. Electronically signed by: Praveen Olivas MD Date:    12/08/2023 Time:    09:08   MRI: No results found in the last 24 hours.  Assessment/Plan:     * Nontraumatic subarachnoid hemorrhage  59 yo female with history of HTN who presents with nontraumatic prepontine subarachnoid hemorrhage, HH2F2    -Admit to [facility]  -Q1h neuro checks  -Imaging and Diagnostics reviewed  -CTA shows stable subarachnoid hemorrhage.  No hydrocephalus. No intracranial aneurysm or AVM is identified.  -Recommend cerebral angiogram with IR  -SBP<140  -HOB @30  -Keppra 500mg BID x 7 days  -TCDs daily  x 14d  -Nimotop 60q4h x 21d  -Eunatremic  -Continue to follow clinically and notify NSGY with any decline in neuro status. Further recommendations pending CTA  -Discussed with Dr. Ron Miles MD  Neurosurgery  [facility] - Neuro Critical Care

## 2023-12-10 NOTE — PLAN OF CARE
"[facility] Care Plan    POC reviewed with Laura Newell and family at 0300. Pt verbalized understanding. Questions and concerns addressed. No acute events overnight. Pt progressing toward goals. Will continue to monitor. See below and flowsheets for full assessment and VS info.     -NPO at midnight  -DSA with IR planned today   -PRN oxy utilized for headache control. Patient states she gets most relief with this.   -Some dizziness and nausea overnight. No vomiting. Neuro exam unchanged. PRN zofran utilized for nausea with some relief. Remains alert, often sleeping but easily aroused.   -BP WDL without use of cardene or PRN meds.        Is this a stroke patient? yes- Stroke booklet reviewed with patient, risk factors identified for patient and stroke booklet remains at bedside for ongoing education.     Neuro:  Balbir Coma Scale  Best Eye Response: 4-->(E4) spontaneous  Best Motor Response: 6-->(M6) obeys commands  Best Verbal Response: 5-->(V5) oriented  [place] Coma Scale Score: 15  Assessment Qualifiers: patient not sedated/intubated  Pupil PERRLA: yes     24hr Temp:  [98.1 °F (36.7 °C)-98.6 °F (37 °C)]     CV:   Rhythm: normal sinus rhythm  BP goals:   SBP < 140  MAP > 65    Resp:           Plan: N/A    GI/:     Diet/Nutrition Received: NPO  Last Bowel Movement: 12/08/23  Voiding Characteristics: voids spontaneously without difficulty    Intake/Output Summary (Last 24 hours) at 12/10/2023 0510  Last data filed at 12/10/2023 0202  Gross per 24 hour   Intake 1850 ml   Output 2900 ml   Net -1050 ml          Labs/Accuchecks:  Recent Labs   Lab 12/10/23  0207   WBC 5.46   RBC 4.26   HGB 13.1   HCT 39.1         Recent Labs   Lab 12/10/23  0207      K 3.8   CO2 21*      BUN 12   CREATININE 0.9   ALKPHOS 76   ALT 14   AST 14   BILITOT 0.8      Recent Labs   Lab 12/08/23  1214   INR 1.0   APTT 27.0    No results for input(s): "CPK", "CPKMB", "TROPONINI", "MB" in the last 168 hours.    Electrolytes: N/A - " electrolytes WDL  Accuchecks: Q6H    Gtts:   nicardipine Stopped (12/08/23 2322)       LDA/Wounds:  Lines/Drains/Airways       Peripheral Intravenous Line  Duration                  Peripheral IV - Single Lumen 12/08/23 0931 20 G Anterior;Left;Proximal Forearm 1 day         Peripheral IV - Single Lumen 12/08/23 2037 18 G Right Antecubital 1 day                  Wounds: No  Wound care consulted: No

## 2023-12-11 LAB
ABO + RH BLD: NORMAL
ALBUMIN SERPL BCP-MCNC: 3.5 G/DL (ref 3.5–5.2)
ALP SERPL-CCNC: 70 U/L (ref 55–135)
ALT SERPL W/O P-5'-P-CCNC: 12 U/L (ref 10–44)
ANION GAP SERPL CALC-SCNC: 10 MMOL/L (ref 8–16)
AST SERPL-CCNC: 14 U/L (ref 10–40)
BASOPHILS # BLD AUTO: 0.08 K/UL (ref 0–0.2)
BASOPHILS NFR BLD: 1.6 % (ref 0–1.9)
BILIRUB SERPL-MCNC: 0.6 MG/DL (ref 0.1–1)
BLD GP AB SCN CELLS X3 SERPL QL: NORMAL
BUN SERPL-MCNC: 11 MG/DL (ref 6–20)
CALCIUM SERPL-MCNC: 9 MG/DL (ref 8.7–10.5)
CHLORIDE SERPL-SCNC: 107 MMOL/L (ref 95–110)
CO2 SERPL-SCNC: 20 MMOL/L (ref 23–29)
CREAT SERPL-MCNC: 0.8 MG/DL (ref 0.5–1.4)
DIFFERENTIAL METHOD: ABNORMAL
EOSINOPHIL # BLD AUTO: 0.3 K/UL (ref 0–0.5)
EOSINOPHIL NFR BLD: 5.1 % (ref 0–8)
ERYTHROCYTE [DISTWIDTH] IN BLOOD BY AUTOMATED COUNT: 12.1 % (ref 11.5–14.5)
EST. GFR  (NO RACE VARIABLE): >60 ML/MIN/1.73 M^2
GLUCOSE SERPL-MCNC: 81 MG/DL (ref 70–110)
HCT VFR BLD AUTO: 39 % (ref 37–48.5)
HGB BLD-MCNC: 13.1 G/DL (ref 12–16)
IMM GRANULOCYTES # BLD AUTO: 0.01 K/UL (ref 0–0.04)
IMM GRANULOCYTES NFR BLD AUTO: 0.2 % (ref 0–0.5)
LYMPHOCYTES # BLD AUTO: 1.6 K/UL (ref 1–4.8)
LYMPHOCYTES NFR BLD: 32.4 % (ref 18–48)
MAGNESIUM SERPL-MCNC: 1.8 MG/DL (ref 1.6–2.6)
MCH RBC QN AUTO: 30.7 PG (ref 27–31)
MCHC RBC AUTO-ENTMCNC: 33.6 G/DL (ref 32–36)
MCV RBC AUTO: 91 FL (ref 82–98)
MONOCYTES # BLD AUTO: 0.4 K/UL (ref 0.3–1)
MONOCYTES NFR BLD: 9 % (ref 4–15)
NEUTROPHILS # BLD AUTO: 2.5 K/UL (ref 1.8–7.7)
NEUTROPHILS NFR BLD: 51.7 % (ref 38–73)
NRBC BLD-RTO: 0 /100 WBC
PHOSPHATE SERPL-MCNC: 3 MG/DL (ref 2.7–4.5)
PLATELET # BLD AUTO: 242 K/UL (ref 150–450)
PMV BLD AUTO: 9.1 FL (ref 9.2–12.9)
POTASSIUM SERPL-SCNC: 3.7 MMOL/L (ref 3.5–5.1)
PROT SERPL-MCNC: 6.5 G/DL (ref 6–8.4)
RBC # BLD AUTO: 4.27 M/UL (ref 4–5.4)
SODIUM SERPL-SCNC: 137 MMOL/L (ref 136–145)
SPECIMEN OUTDATE: NORMAL
WBC # BLD AUTO: 4.88 K/UL (ref 3.9–12.7)

## 2023-12-11 PROCEDURE — 25000003 PHARM REV CODE 250: Performed by: NURSE PRACTITIONER

## 2023-12-11 PROCEDURE — 85025 COMPLETE CBC W/AUTO DIFF WBC: CPT

## 2023-12-11 PROCEDURE — 25000003 PHARM REV CODE 250

## 2023-12-11 PROCEDURE — 84100 ASSAY OF PHOSPHORUS: CPT

## 2023-12-11 PROCEDURE — 25000003 PHARM REV CODE 250: Performed by: STUDENT IN AN ORGANIZED HEALTH CARE EDUCATION/TRAINING PROGRAM

## 2023-12-11 PROCEDURE — 25000003 PHARM REV CODE 250: Performed by: PSYCHIATRY & NEUROLOGY

## 2023-12-11 PROCEDURE — 83735 ASSAY OF MAGNESIUM: CPT

## 2023-12-11 PROCEDURE — 63600175 PHARM REV CODE 636 W HCPCS

## 2023-12-11 PROCEDURE — 25000003 PHARM REV CODE 250: Performed by: PHYSICIAN ASSISTANT

## 2023-12-11 PROCEDURE — 86850 RBC ANTIBODY SCREEN: CPT | Performed by: PHYSICIAN ASSISTANT

## 2023-12-11 PROCEDURE — 80053 COMPREHEN METABOLIC PANEL: CPT

## 2023-12-11 PROCEDURE — 99233 SBSQ HOSP IP/OBS HIGH 50: CPT | Mod: ,,, | Performed by: PSYCHIATRY & NEUROLOGY

## 2023-12-11 PROCEDURE — 20000000 HC ICU ROOM

## 2023-12-11 PROCEDURE — 63600175 PHARM REV CODE 636 W HCPCS: Performed by: STUDENT IN AN ORGANIZED HEALTH CARE EDUCATION/TRAINING PROGRAM

## 2023-12-11 PROCEDURE — 99233 PR SUBSEQUENT HOSPITAL CARE,LEVL III: ICD-10-PCS | Mod: ,,, | Performed by: PSYCHIATRY & NEUROLOGY

## 2023-12-11 PROCEDURE — 94761 N-INVAS EAR/PLS OXIMETRY MLT: CPT

## 2023-12-11 RX ORDER — SODIUM CHLORIDE 9 MG/ML
INJECTION, SOLUTION INTRAVENOUS CONTINUOUS
Status: DISCONTINUED | OUTPATIENT
Start: 2023-12-11 | End: 2023-12-12

## 2023-12-11 RX ORDER — FENTANYL CITRATE 50 UG/ML
INJECTION, SOLUTION INTRAMUSCULAR; INTRAVENOUS
Status: COMPLETED | OUTPATIENT
Start: 2023-12-11 | End: 2023-12-11

## 2023-12-11 RX ORDER — LIDOCAINE HYDROCHLORIDE 10 MG/ML
INJECTION INFILTRATION; PERINEURAL
Status: COMPLETED | OUTPATIENT
Start: 2023-12-11 | End: 2023-12-11

## 2023-12-11 RX ORDER — MIDAZOLAM HYDROCHLORIDE 1 MG/ML
INJECTION INTRAMUSCULAR; INTRAVENOUS
Status: COMPLETED | OUTPATIENT
Start: 2023-12-11 | End: 2023-12-11

## 2023-12-11 RX ORDER — SODIUM CHLORIDE 0.9 % (FLUSH) 0.9 %
10 SYRINGE (ML) INJECTION
Status: DISCONTINUED | OUTPATIENT
Start: 2023-12-11 | End: 2023-12-12

## 2023-12-11 RX ADMIN — LEVETIRACETAM 500 MG: 500 TABLET, FILM COATED ORAL at 08:12

## 2023-12-11 RX ADMIN — NIMODIPINE 60 MG: 30 CAPSULE, LIQUID FILLED ORAL at 05:12

## 2023-12-11 RX ADMIN — ONDANSETRON 8 MG: 8 TABLET, ORALLY DISINTEGRATING ORAL at 09:12

## 2023-12-11 RX ADMIN — METHOCARBAMOL 500 MG: 500 TABLET ORAL at 05:12

## 2023-12-11 RX ADMIN — FENTANYL CITRATE 50 MCG: 50 INJECTION, SOLUTION INTRAMUSCULAR; INTRAVENOUS at 02:12

## 2023-12-11 RX ADMIN — MIDAZOLAM HYDROCHLORIDE 0.5 MG: 2 INJECTION, SOLUTION INTRAMUSCULAR; INTRAVENOUS at 02:12

## 2023-12-11 RX ADMIN — OXYCODONE HYDROCHLORIDE 5 MG: 5 TABLET ORAL at 02:12

## 2023-12-11 RX ADMIN — METHOCARBAMOL 500 MG: 500 TABLET ORAL at 09:12

## 2023-12-11 RX ADMIN — OXYCODONE HYDROCHLORIDE 5 MG: 5 TABLET ORAL at 07:12

## 2023-12-11 RX ADMIN — OXYCODONE HYDROCHLORIDE 5 MG: 5 TABLET ORAL at 03:12

## 2023-12-11 RX ADMIN — SODIUM CHLORIDE 1000 ML: 9 INJECTION, SOLUTION INTRAVENOUS at 04:12

## 2023-12-11 RX ADMIN — ONDANSETRON 8 MG: 8 TABLET, ORALLY DISINTEGRATING ORAL at 07:12

## 2023-12-11 RX ADMIN — SODIUM CHLORIDE: 0.9 INJECTION, SOLUTION INTRAVENOUS at 12:12

## 2023-12-11 RX ADMIN — NIMODIPINE 60 MG: 30 CAPSULE, LIQUID FILLED ORAL at 09:12

## 2023-12-11 RX ADMIN — LIDOCAINE HYDROCHLORIDE 5 ML: 10 INJECTION, SOLUTION INFILTRATION; PERINEURAL at 02:12

## 2023-12-11 RX ADMIN — LEVETIRACETAM 500 MG: 500 TABLET, FILM COATED ORAL at 09:12

## 2023-12-11 RX ADMIN — SODIUM CHLORIDE 500 ML: 9 INJECTION, SOLUTION INTRAVENOUS at 05:12

## 2023-12-11 RX ADMIN — LABETALOL HYDROCHLORIDE 10 MG: 5 INJECTION, SOLUTION INTRAVENOUS at 04:12

## 2023-12-11 RX ADMIN — MIDAZOLAM HYDROCHLORIDE 1 MG: 2 INJECTION, SOLUTION INTRAMUSCULAR; INTRAVENOUS at 02:12

## 2023-12-11 RX ADMIN — OXYCODONE HYDROCHLORIDE 5 MG: 5 TABLET ORAL at 09:12

## 2023-12-11 RX ADMIN — FENTANYL CITRATE 25 MCG: 50 INJECTION, SOLUTION INTRAMUSCULAR; INTRAVENOUS at 02:12

## 2023-12-11 RX ADMIN — GABAPENTIN 300 MG: 300 CAPSULE ORAL at 09:12

## 2023-12-11 RX ADMIN — NIMODIPINE 60 MG: 30 CAPSULE, LIQUID FILLED ORAL at 02:12

## 2023-12-11 NOTE — SUBJECTIVE & OBJECTIVE
"Interval History: NAEON. Neuro exam stable.    Medications:  Continuous Infusions:   sodium chloride 0.9%       Scheduled Meds:   amLODIPine  5 mg Oral Daily    gabapentin  300 mg Oral TID    levETIRAcetam  500 mg Oral BID    methocarbamoL  500 mg Oral QID    niMODipine  60 mg Oral Q4H     PRN Meds:acetaminophen, labetalol, magnesium oxide, magnesium oxide, melatonin, ondansetron, oxyCODONE, potassium bicarbonate, potassium bicarbonate, potassium bicarbonate, potassium, sodium phosphates, potassium, sodium phosphates, potassium, sodium phosphates     Review of Systems  Objective:     Weight: 64.9 kg (143 lb)  Body mass index is 24.55 kg/m².  Vital Signs (Most Recent):  Temp: 97.6 °F (36.4 °C) (12/11/23 1102)  Pulse: 75 (12/11/23 1102)  Resp: 16 (12/11/23 1102)  BP: 133/64 (12/11/23 1102)  SpO2: 98 % (12/11/23 1102) Vital Signs (24h Range):  Temp:  [97.4 °F (36.3 °C)-98 °F (36.7 °C)] 97.6 °F (36.4 °C)  Pulse:  [64-83] 75  Resp:  [10-20] 16  SpO2:  [94 %-100 %] 98 %  BP: (102-138)/(55-69) 133/64     Date 12/11/23 0700 - 12/12/23 0659   Shift 0880-6369 4770-0876 3876-1877 24 Hour Total   INTAKE   Shift Total(mL/kg)       OUTPUT   Urine(mL/kg/hr) 500   500   Shift Total(mL/kg) 500(7.7)   500(7.7)   Weight (kg) 64.9 64.9 64.9 64.9                               Physical Exam   Aox3. E4V5M6  PERRL, EOMI  CN II-XII intact grossly.  Face Symmetric, tongue midline, symmetric shoulder shrug.  BUE 5/5, BLE 5/5  Sensation intact throughout.  No drift.      Neurosurgery Physical Exam    Significant Labs:  Recent Labs   Lab 12/10/23  0207 12/11/23  0230   * 81    137   K 3.8 3.7    107   CO2 21* 20*   BUN 12 11   CREATININE 0.9 0.8   CALCIUM 8.9 9.0   MG 1.9 1.8     Recent Labs   Lab 12/10/23  0207 12/11/23  0230   WBC 5.46 4.88   HGB 13.1 13.1   HCT 39.1 39.0    242     No results for input(s): "LABPT", "INR", "APTT" in the last 48 hours.  Microbiology Results (last 7 days)       ** No results found for " the last 168 hours. **          All pertinent labs from the last 24 hours have been reviewed.    Significant Diagnostics:  I have reviewed and interpreted all pertinent imaging results/findings within the past 24 hours.

## 2023-12-11 NOTE — PLAN OF CARE
"[facility] Care Plan    POC reviewed with Laura Newell and family at 1400. Pt verbalized understanding. Questions and concerns addressed. No acute events today. Pt progressing toward goals. Will continue to monitor. See below and flowsheets for full assessment and VS info.     -1L NS bolus given per orders  -accuchecks dc'd  -pain mgmt w PRN medications  -PT with patient today  -team aware pt NPO since midnight, angio pending      Is this a stroke patient? yes- Stroke booklet reviewed with patient, risk factors identified for patient and stroke booklet remains at bedside for ongoing education.     Neuro:  Balbir Coma Scale  Best Eye Response: 4-->(E4) spontaneous  Best Motor Response: 6-->(M6) obeys commands  Best Verbal Response: 5-->(V5) oriented  [place] Coma Scale Score: 15  Assessment Qualifiers: patient not sedated/intubated  Pupil PERRLA: yes     24 hr Temp:  [97.6 °F (36.4 °C)-98.6 °F (37 °C)]     CV:   Rhythm: normal sinus rhythm  BP goals:   SBP < 140  MAP > 65    Resp:           Plan: N/A    GI/:     Diet/Nutrition Received: NPO  Last Bowel Movement: 12/08/23  Voiding Characteristics: voids spontaneously without difficulty    Intake/Output Summary (Last 24 hours) at 12/10/2023 1829  Last data filed at 12/10/2023 1705  Gross per 24 hour   Intake 1859.06 ml   Output 2850 ml   Net -990.94 ml          Labs/Accuchecks:  Recent Labs   Lab 12/10/23  0207   WBC 5.46   RBC 4.26   HGB 13.1   HCT 39.1         Recent Labs   Lab 12/10/23  0207      K 3.8   CO2 21*      BUN 12   CREATININE 0.9   ALKPHOS 76   ALT 14   AST 14   BILITOT 0.8      Recent Labs   Lab 12/08/23  1214   INR 1.0   APTT 27.0    No results for input(s): "CPK", "CPKMB", "TROPONINI", "MB" in the last 168 hours.    Electrolytes: N/A - electrolytes WDL  Accuchecks: none    Gtts:   nicardipine Stopped (12/08/23 2342)       LDA/Wounds:  Lines/Drains/Airways       Peripheral Intravenous Line  Duration                  Peripheral IV - " Single Lumen 12/08/23 0931 20 G Anterior;Left;Proximal Forearm 2 days         Peripheral IV - Single Lumen 12/08/23 2037 18 G Right Antecubital 1 day                  Wounds: No  Wound care consulted: No

## 2023-12-11 NOTE — CONSULTS
Interventional Neuroradiology Pre-procedure Note    Procedure: Diagnostic cerebral angiogram    History of Present Illness:  Laura Newell is a 58 y.o. female with PMH migraines, anxiety, and HTN who [place] [facility] [place] the anterior and right lateral aspects of the katiuska without IVH on 12/10. [facility] consulted for diagnostic angiogram. Admission H&P reviewed. Admission H&P reviewed.    ROS:   Hematological: no known coagulopathies  Respiratory: no shortness of breath  Cardiovascular: no chest pain  Gastrointestinal: no abdominal pain  Genito-Urinary: no dysuria  Musculoskeletal: negative  Neurological: +headache, no numbness/weakness     Scheduled Meds:    amLODIPine  5 mg Oral Daily    gabapentin  300 mg Oral TID    levETIRAcetam  500 mg Oral BID    methocarbamoL  500 mg Oral QID    niMODipine  60 mg Oral Q4H     Current Meds:   Current Facility-Administered Medications:     acetaminophen tablet 650 mg, 650 mg, Oral, Q4H PRN, Car Singh MD, 650 mg at 12/10/23 1854    amLODIPine tablet 5 mg, 5 mg, Oral, Daily, Carol Barreto PA-C, 5 mg at 12/10/23 0810    gabapentin capsule 300 mg, 300 mg, Oral, TID, Carol Barreto, PA-C, 300 mg at 12/10/23 2117    labetalol 20 mg/4 mL (5 mg/mL) IV syring, 10 mg, Intravenous, Q15 Min PRN, Car Singh MD    levETIRAcetam tablet 500 mg, 500 mg, Oral, BID, Car Singh MD, 500 mg at 12/10/23 2117    magnesium oxide tablet 800 mg, 800 mg, Oral, PRN, Car Singh MD    magnesium oxide tablet 800 mg, 800 mg, Oral, PRN, Car Singh MD    melatonin tablet 6 mg, 6 mg, Oral, Nightly PRN, Car Singh MD    methocarbamoL tablet 500 mg, 500 mg, Oral, QID, Carol Barreto, PA-C, 500 mg at 12/10/23 2117    niCARdipine 40 mg/200 mL (0.2 mg/mL) infusion, 0-15 mg/hr, Intravenous, Continuous, Amol Chahal MD, Stopped at 12/08/23 1701    niMODipine capsule 60 mg, 60 mg, Oral, Q4H, Adams Pope MD, 60 mg at 12/11/23 4203    ondansetron  "disintegrating tablet 8 mg, 8 mg, Oral, Q8H PRN, Car Singh MD, 8 mg at 12/11/23 0740    oxyCODONE immediate release tablet 5 mg, 5 mg, Oral, Q4H PRN, aCr Singh MD, 5 mg at 12/11/23 0741    potassium bicarbonate disintegrating tablet 35 mEq, 35 mEq, Oral, PRLEON, Car Singh MD    potassium bicarbonate disintegrating tablet 50 mEq, 50 mEq, Oral, PRN, Car Singh MD    potassium bicarbonate disintegrating tablet 60 mEq, 60 mEq, Oral, PRN, Car Singh MD    potassium, sodium phosphates 280-160-250 mg packet 2 packet, 2 packet, Oral, PRNSamantha Connor, MD    potassium, sodium phosphates 280-160-250 mg packet 2 packet, 2 packet, Oral, PRSamantha QUINTERO Connor, MD    potassium, sodium phosphates 280-160-250 mg packet 2 packet, 2 packet, Oral, PRSamantha QUINTERO Connor, MD   Continuous Infusions:    nicardipine Stopped (12/08/23 2322)     PRN Meds:acetaminophen, labetalol, magnesium oxide, magnesium oxide, melatonin, ondansetron, oxyCODONE, potassium bicarbonate, potassium bicarbonate, potassium bicarbonate, potassium, sodium phosphates, potassium, sodium phosphates, potassium, sodium phosphates    Allergies:   Review of patient's allergies indicates:   Allergen Reactions    Erythromycin     Pcn [penicillins]      Sedation Hx: No adverse events.    Labs:     WBC/Hgb/Hct/Plts:  4.88/13.1/39.0/242 (12/11 0230)  BUN/Cr/glu/ALT/AST/amyl/lip:  11/0.8/--/12/14/--/-- (12/11 0230)     Objective:  Vitals: Blood pressure 138/65, pulse 69, temperature 97.4 °F (36.3 °C), temperature source Oral, resp. rate 20, height 5' 4" (1.626 m), weight 64.9 kg (143 lb), SpO2 97 %.     Physical Exam:  General: well developed, well nourished, no distress.   Head: normocephalic, atraumatic  Neck: No tracheal deviation. No palpable masses. Full ROM.   Neurologic: Alert and oriented. Thought content appropriate.  GCS: E4 V5 M6; Total: 15  Language: No aphasia  Speech: No dysarthria  Cranial nerves: face symmetric, tongue midline, " CN II-XII grossly intact.   Eyes: pupils equal, round, reactive to light with accomodation, EOMI.   Pulmonary: normal respirations, no signs of respiratory distress  Abdomen: soft, non-distended, not tender to palpation  Vascular: Pulses 2+ and symmetric radial and dorsalis pedis. No LE edema.   Skin: Skin is warm, dry and intact.  Sensory: intact to light touch throughout  Motor Strength: Moves all extremities spontaneously with good tone.  Full strength upper and lower extremities. No abnormal movements seen.     ASA: 2  MAL: 2    Plan:  -Plan for cerebral angiogram   -Sedation Plan: moderate sedation   -All diagnostics and imaging reviewed  -Patient NPO since MN  -Risks & benefits of procedure explained in detail; patient consented and all questions answered  -Further reccs to follow procedure          Venkata Livingston MD, MHA  Fellow, NeuroEndovascular Surgery, [facility]  Neurologist, [facility], [place]

## 2023-12-11 NOTE — PROGRESS NOTES
[facility] - Neuro Critical Care  Neurocritical Care  Progress Note    Admit Date: 12/8/2023  Service Date: 12/11/2023  Length of Stay: 3    Subjective:     Chief Complaint: Nontraumatic subarachnoid hemorrhage    History of Present Illness: 57 y/o F w/ PMH migraines, anxiety, and HTN who presented to [facility] with SAH. Patient states that she was working out this morning when she developed a sudden, severe headache, described as among the worst of her life, which prompted her to come to the [facility] ED. According to patient, the headache's improved considerably after she took Excedrin and reduced her activity; if she sits with her eyes closed the pain is 2/10 in intensity and increases to 9/10 with movement. Upon presentation to [facility], patient's BP was measured into the 200s systolics. The patient states that she had a BP into 180s at her last visit with her general practitioner but was not prescribed medication because her pressures were in normal at home. 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 The patient will be admitted to [facility] for further neuro-monitoring and higher level of care.    Hospital Course: 12/9/23: Follow up CTH shows stable SAH. Likely to go for cerebral angiogram later this afternoon.  12/10/23: 1 liter bolus  12/11/23: Post SAH day 3. NAEO. TCDs stable. To go for cerebral angiogram today. Headache pain controlled w/ current pain regimen.    Interval History:  Post SAH day 3. NAEO. TCDs stable. To go for cerebral angiogram today. Headache pain controlled w/ current pain regimen.    Review of Systems   Constitutional:  Negative for fatigue and fever.   HENT: Negative.     Eyes:  Positive for photophobia.   Respiratory: Negative.     Cardiovascular:  Negative for chest pain and palpitations.   Gastrointestinal: Negative.    Endocrine: Negative.    Genitourinary:  Negative.    Musculoskeletal: Negative.  Negative for neck stiffness.   Skin: Negative.    Neurological:  Positive for headaches. Negative for dizziness, seizures, syncope, facial asymmetry, speech difficulty, weakness, light-headedness and numbness.   Psychiatric/Behavioral: Negative.         Objective:     Vitals:  Temp: 97.6 °F (36.4 °C)  Pulse: 69  Rhythm: normal sinus rhythm  BP: 127/64  MAP (mmHg): 90  Resp: 18  SpO2: 96 %    Temp  Min: 97.4 °F (36.3 °C)  Max: 98 °F (36.7 °C)  Pulse  Min: 64  Max: 83  BP  Min: 102/59  Max: 138/65  MAP (mmHg)  Min: 77  Max: 95  Resp  Min: 10  Max: 20  SpO2  Min: 94 %  Max: 100 %    12/10 0701 - 12/11 0700  In: 2602 [P.O.:170]  Out: 2850 [Urine:2850]            Physical Exam  Constitutional:       Appearance: She is not toxic-appearing.   HENT:      Head: Normocephalic.      Nose: Nose normal.      Mouth/Throat:      Mouth: Mucous membranes are moist.   Eyes:      Pupils: Pupils are equal, round, and reactive to light.   Cardiovascular:      Rate and Rhythm: Normal rate and regular rhythm.      Heart sounds: No murmur heard.  Pulmonary:      Effort: Pulmonary effort is normal.      Breath sounds: No stridor. No wheezing.   Abdominal:      General: Abdomen is flat. There is no distension.      Palpations: Abdomen is soft.      Tenderness: There is no abdominal tenderness.   Musculoskeletal:         General: Normal range of motion.      Cervical back: Normal range of motion.      Right lower leg: No edema.      Left lower leg: No edema.   Skin:     General: Skin is warm.   Neurological:      Mental Status: She is alert.      Comments: GCS 15  PEERL  5/5 BL UE and LE  EOMI  Sensation in tact  Cranial nerves in tact   Psychiatric:         Mood and Affect: Mood normal.         Behavior: Behavior normal.         Thought Content: Thought content normal.         Judgment: Judgment normal.              Medications:  Continuoussodium chloride 0.9%, Last Rate: 50 mL/hr at 12/11/23  1245    ScheduledamLODIPine, 5 mg, Daily  gabapentin, 300 mg, TID  levETIRAcetam, 500 mg, BID  methocarbamoL, 500 mg, QID  niMODipine, 60 mg, Q4H    PRNacetaminophen, 650 mg, Q4H PRN  labetalol, 10 mg, Q15 Min PRN  magnesium oxide, 800 mg, PRN  magnesium oxide, 800 mg, PRN  melatonin, 6 mg, Nightly PRN  ondansetron, 8 mg, Q8H PRN  oxyCODONE, 5 mg, Q4H PRN  potassium bicarbonate, 35 mEq, PRN  potassium bicarbonate, 50 mEq, PRN  potassium bicarbonate, 60 mEq, PRN  potassium, sodium phosphates, 2 packet, PRN  potassium, sodium phosphates, 2 packet, PRN  potassium, sodium phosphates, 2 packet, PRN      Today I personally reviewed pertinent medications, lines/drains/airways, imaging, cardiology results, laboratory results, microbiology results, notably:    Diet  Diet NPO  Diet NPO        Assessment/Plan:     Neuro  * Nontraumatic subarachnoid hemorrhage  57 y/o F w/ PMH migraines, anxiety, and HTN who [place] [facility] [place]. Patient states that she was working out this morning when she developed a sudden, severe headache, described as among the worst of her life, which prompted her to come to the [facility] ED. According to patient, the headache's improved considerably after she took Excedrin and reduced her activity; if she sits with her eyes closed the pain is 2/10 in intensity and increases to 9/10 with movement. Upon presentation to [facility], patient's BP was measured into the 200s systolics. The patient states that she had a BP into 180s at her last visit with her general practitioner but was not prescribed medication because her pressures were in normal at home. 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 The patient will be admitted to [facility] for further neuro-monitoring and higher level of care.    Plan:  -Admit to NCC  [facility]  -SBP <140  -Daily  TCDs  -Follow up CT 4PM today  -Daily CBC, CMP, Mg, Phos, Lipids  -Nimodipine 60 mg q4hrs   -Keppra 500 mg BID 7 days  -Q1 neurochecks   -Pain control  -Consider echocardiogram  -Possible cerebral angiogram w/ IR per NSGY    12/9: Will go for cerebral angiogram w/ neuro IR today to assess for presence of aneurysm or AVM  12/10: angio today    12/11: To go for cerebral angiogram today. TCDs stable, showing no vasospasm.     Cerebral brain hemorrhage        Aggressive risk factor modification: HTN     Rehab efforts: The patient has been evaluated by a stroke team provider and the therapy needs have been fully considered based off the presenting complaints and exam findings. The following therapy evaluations are needed: None    Diagnostics ordered/pending: Other: Cerebral angiogram, Echocardiogram    VTE prophylaxis: None: Reason for No Pharmacological VTE Prophylaxis: SAH    BP parameters: SAH: <140        Cardiac/Vascular  Elevated cholesterol  Hx of elevated lipoproteins   Will get new lipid panel     Essential hypertension  Previously on Losartan-HCTZ for HTN. Has not been on medication for extended period of time due to reported normal home pressures.  Will keep SBP <140 post SAH. Currently on Cardene drip. Will add Labetolol and Hydralazine PRNs.     12/9: BP controlled of cardene drip. Amlodipine 5mg added.              The patient is being Prophylaxed for:  Venous Thromboembolism with: None  Stress Ulcer with: Not Applicable   Ventilator Pneumonia with: not applicable    Activity Orders            Diet NPO: NPO starting at 12/10 0001    Progressive Mobility Protocol (mobilize patient to their highest level of functioning at least twice daily) starting at 12/08 2000    Turn patient starting at 12/08 1400    Elevate HOB starting at 12/08 1324          Full Code    Car Valentine MD  Neurocritical Care  [facility] - Neuro Critical Care

## 2023-12-11 NOTE — H&P
Interventional Neuroradiology Pre-procedure Note    Procedure: Diagnostic cerebral angiogram    History of Present Illness:  Laura Newell is a 58 y.o. female with PMH migraines, anxiety, and HTN who presented to [facility] with SAH along the anterior and right lateral aspects of the katiuska without IVH on 12/10. CRYSTAL consulted for diagnostic angiogram. Admission H&P reviewed.    ROS:   Hematological: no known coagulopathies  Respiratory: no shortness of breath  Cardiovascular: no chest pain  Gastrointestinal: no abdominal pain  Genito-Urinary: no dysuria  Musculoskeletal: negative  Neurological: +headache, no numbness/weakness    Scheduled Meds:    amLODIPine  5 mg Oral Daily    gabapentin  300 mg Oral TID    levETIRAcetam  500 mg Oral BID    methocarbamoL  500 mg Oral QID    niMODipine  60 mg Oral Q4H     Current Meds:   Current Facility-Administered Medications:     acetaminophen tablet 650 mg, 650 mg, Oral, Q4H PRN, Car Singh MD, 650 mg at 12/10/23 1854    amLODIPine tablet 5 mg, 5 mg, Oral, Daily, Carol Barreto PA-C, 5 mg at 12/10/23 0810    gabapentin capsule 300 mg, 300 mg, Oral, TID, Carol Barreto, PA-C, 300 mg at 12/10/23 2117    labetalol 20 mg/4 mL (5 mg/mL) IV syring, 10 mg, Intravenous, Q15 Min PRN, Car Singh MD    levETIRAcetam tablet 500 mg, 500 mg, Oral, BID, Car Singh MD, 500 mg at 12/10/23 2117    magnesium oxide tablet 800 mg, 800 mg, Oral, PRN, Car Singh MD    magnesium oxide tablet 800 mg, 800 mg, Oral, PRN, Car Singh MD    melatonin tablet 6 mg, 6 mg, Oral, Nightly PRN, Car Singh MD    methocarbamoL tablet 500 mg, 500 mg, Oral, QID, Carol Barreto, PA-C, 500 mg at 12/10/23 2117    niCARdipine 40 mg/200 mL (0.2 mg/mL) infusion, 0-15 mg/hr, Intravenous, Continuous, Amol Chahal MD, Stopped at 12/08/23 2322    niMODipine capsule 60 mg, 60 mg, Oral, Q4H, Adams Pope MD, 60 mg at 12/11/23 8753    ondansetron disintegrating tablet 8 mg, 8 mg,  "Oral, Q8H PRN, Car Singh MD, 8 mg at 12/11/23 0740    oxyCODONE immediate release tablet 5 mg, 5 mg, Oral, Q4H PRNSamantha Connor, MD, 5 mg at 12/11/23 0741    potassium bicarbonate disintegrating tablet 35 mEq, 35 mEq, Oral, PRSamantha QUINTERO Connor, MD    potassium bicarbonate disintegrating tablet 50 mEq, 50 mEq, Oral, PRSamantha QUINTERO Connor, MD    potassium bicarbonate disintegrating tablet 60 mEq, 60 mEq, Oral, PRNSamantha Connor, MD    potassium, sodium phosphates 280-160-250 mg packet 2 packet, 2 packet, Oral, PRSamantha QUINTERO Connor, MD    potassium, sodium phosphates 280-160-250 mg packet 2 packet, 2 packet, Oral, PRSamantha QUINTERO Connor, MD    potassium, sodium phosphates 280-160-250 mg packet 2 packet, 2 packet, Oral, PRSamantha QUINTERO Connor, MD   Continuous Infusions:    nicardipine Stopped (12/08/23 2322)     PRN Meds:acetaminophen, labetalol, magnesium oxide, magnesium oxide, melatonin, ondansetron, oxyCODONE, potassium bicarbonate, potassium bicarbonate, potassium bicarbonate, potassium, sodium phosphates, potassium, sodium phosphates, potassium, sodium phosphates    Allergies:   Review of patient's allergies indicates:   Allergen Reactions    Erythromycin     Pcn [penicillins]      Sedation Hx: No adverse events.    Labs:     WBC/Hgb/Hct/Plts:  4.88/13.1/39.0/242 (12/11 0230)  BUN/Cr/glu/ALT/AST/amyl/lip:  11/0.8/--/12/14/--/-- (12/11 0230)     Objective:  Vitals: Blood pressure 138/65, pulse 69, temperature 97.4 °F (36.3 °C), temperature source Oral, resp. rate 20, height 5' 4" (1.626 m), weight 64.9 kg (143 lb), SpO2 97 %.     Physical Exam:  General: well developed, well nourished, no distress.   Head: normocephalic, atraumatic  Neck: No tracheal deviation. No palpable masses. Full ROM.   Neurologic: Alert and oriented. Thought content appropriate.  GCS: E4 V5 M6; Total: 15  Language: No aphasia  Speech: No dysarthria  Cranial nerves: face symmetric, tongue midline, CN II-XII grossly intact.   Eyes: " pupils equal, round, reactive to light with accomodation, EOMI.   Pulmonary: normal respirations, no signs of respiratory distress  Abdomen: soft, non-distended, not tender to palpation  Vascular: Pulses 2+ and symmetric radial and dorsalis pedis. No LE edema.   Skin: Skin is warm, dry and intact.  Sensory: intact to light touch throughout  Motor Strength: Moves all extremities spontaneously with good tone.  Full strength upper and lower extremities. No abnormal movements seen.     ASA: 2  MAL: 2    Plan:  -Plan for cerebral angiogram   -Sedation Plan: moderate sedation   -All diagnostics and imaging reviewed  -Patient NPO since MN  -Risks & benefits of procedure explained in detail; patient consented and all questions answered  -Further reccs to follow procedure          Venkata Livingston MD, MHA  Fellow, NeuroEndovascular Surgery, [facility]  Neurologist, [facility] [place], [place]

## 2023-12-11 NOTE — PLAN OF CARE
Procedure completed. Patient tolerated well; VSS. Hemostasis achieved to right groin via 5 Fr Vascade at 1502; patient to remain flat until 1702. Site CDI without hematoma. Pedal pulses intact. Patient to be transported back to [id number]; report given to ICU RN Ana Laura.

## 2023-12-11 NOTE — PLAN OF CARE
Pt arrived to IR Room 4 for cerebral angiogram accompanied by ICU RN. Pt oriented to unit and staff. Plan of care reviewed with patient, patient verbalizes understanding. Comfort measures utilized. Pt safely transferred from stretcher to procedural table. Fall risk reviewed with patient, fall risk interventions maintained. Safety strap applied, positioner pillows utilized to minimize pressure points. Blankets applied. Pt prepped and draped utilizing standard sterile technique. Patient placed on continuous monitoring, as required by sedation policy. Timeouts completed utilizing standard universal time-out, per department and facility policy. RN to remain at bedside, continuous monitoring maintained. Pt resting comfortably. Denies pain/discomfort. Will continue to monitor. See flow sheets for monitoring, medication administration, and updates.

## 2023-12-11 NOTE — PLAN OF CARE
[facility] - Neuro Critical Care  Initial Discharge Assessment       Primary Care Provider: Frankie Suh MD    Admission Diagnosis: SAH (subarachnoid hemorrhage) [I60.9]    Admission Date: 12/8/2023  Expected Discharge Date: 12/12/2023    Transition of Care Barriers: None    Payor: BLUE CROSS BLUE SHIELD / Plan: [employer] [facility] [place] / Product Type: Commercial /     Extended Emergency Contact Information  Primary Emergency Contact: Bravo Newell  Address: [address], [place] [address] [place] [address]  Home Phone: [phone]  Mobile Phone: [phone]  Relation: Spouse    Discharge Plan A: Home with family  Discharge Plan B: Home Health      [facility] [place], [place] - [address]  [address] [facility] [address]  Phone: [phone] Fax: [phone]    [id number] [place] - [address]  [address]  [facility] [address]  Phone: [phone] Fax: [phone]    [id number] - [place], [place] - [address]  [address] [facility] [address]  Phone: [phone] Fax: [phone]        Transferred from:  [facility]    Past Medical History:   Diagnosis Date    Asthma     Lung collapse     Pneumothorax     spontaneous         CM met with patient in room for Discharge Planning Assessment.  Patient is able to answer questions.  Per patient, she lives with Bravo Newell () [phone]  in a single story house with 0 step(s) to enter.   Per patient, she was independent with ADLS and used no dme for ambulation.  Patient will have assistance from her  upon discharge.   Discharge Planning Booklet given to patient/family and discussed.  All questions addressed.  CM will follow for needs.      Discharge Plan A and Plan B have been determined by review of patient's clinical status, future medical and therapeutic needs, and coverage/benefits for post-acute care in coordination with multidisciplinary team members.        Initial Assessment  (most recent)       Adult Discharge Assessment - 12/11/23 1509          Discharge Assessment    Assessment Type Discharge Planning Assessment     Confirmed/corrected address, phone number and insurance Yes     Confirmed Demographics Correct on Facesheet     Source of Information patient     Communicated FABIOLA with patient/caregiver Date not available/Unable to determine     Reason For Admission Nontraumatic subarachnoid hemorrhage     People in Home spouse     Facility [place] [facility]: [facility]     Do you expect to return to your current living situation? Yes     Do you have help at home or someone to help you manage your care at home? Yes     Who are your caregiver(s) and their phone number(s)? Bravo Newell () [phone]     Prior to hospitilization cognitive status: Alert/Oriented     Current cognitive status: Alert/Oriented     Walking or Climbing Stairs Difficulty no     Dressing/Bathing Difficulty no     Home Accessibility stairs to enter home     Number of Stairs, Main Entrance none     Stairs Comment, Main Entrance none     Home Layout Able to live on 1st floor     Equipment Currently Used at Home none     Readmission within 30 days? No     Patient currently being followed by outpatient case management? Unable to determine (comments)     Do you currently have service(s) that help you manage your care at home? No     Do you take prescription medications? Yes     Do you have prescription coverage? Yes     Coverage BCBS     Do you have any problems affording any of your prescribed medications? No     Is the patient taking medications as prescribed? yes     Who is going to help you get home at discharge? Bravo Newell () [phone]     How do you get to doctors appointments? family or friend will provide     Are you on dialysis? No     Do you take coumadin? No     Discharge Plan A Home with family     Discharge Plan B Home Health     DME Needed Upon Discharge  none     Discharge Plan discussed with: Patient      Transition of Care Barriers None        Physical Activity    On average, how many days per week do you engage in moderate to strenuous exercise (like a brisk walk)? 4 days     On average, how many minutes do you engage in exercise at this level? 60 min        Financial Resource Strain    How hard is it for you to pay for the very basics like food, housing, medical care, and heating? Not hard at all        Housing Stability    In the last 12 months, was there a time when you were not able to pay the mortgage or rent on time? No     In the last 12 months, how many places have you lived? 1     In the last 12 months, was there a time when you did not have a steady place to sleep or slept in a shelter (including now)? No        Transportation Needs    In the past 12 months, has lack of transportation kept you from medical appointments or from getting medications? No     In the past 12 months, has lack of transportation kept you from meetings, work, or from getting things needed for daily living? No        Food Insecurity    Within the past 12 months, you worried that your food would run out before you got the money to buy more. Never true     Within the past 12 months, the food you bought just didn't last and you didn't have money to get more. Never true        Stress    Do you feel stress - tense, restless, nervous, or anxious, or unable to sleep at night because your mind is troubled all the time - these days? To some extent        Social Connections    In a typical week, how many times do you talk on the phone with family, friends, or neighbors? Never     How often do you get together with friends or relatives? Never     How often do you attend Roman Catholic or Quaker services? Never     Do you belong to any clubs or organizations such as Roman Catholic groups, unions, fraternal or athletic groups, or school groups? No     How often do you attend meetings of the clubs or organizations you belong to? Never     Are you ,  , , , never , or living with a partner?         Alcohol Use    Q1: How often do you have a drink containing alcohol? Never     Q2: How many drinks containing alcohol do you have on a typical day when you are drinking? Patient does not drink     Q3: How often do you have six or more drinks on one occasion? Never        OTHER    Name(s) of People in Home Bravo Newell () [phone]                      Myrna Rajan RN, CCRN-K, [facility]  Neuro-Critical Care   X [id number]

## 2023-12-11 NOTE — PLAN OF CARE
"[facility] Care Plan    POC reviewed with Laura Newell and family at 0300. Pt verbalized understanding. Questions and concerns addressed. No acute events overnight. Pt progressing toward goals. Will continue to monitor. See below and flowsheets for full assessment and VS info.     -IR procedure still pending. NPO except meds since 10/9 night, if no procedure today please add diet for the day.  -1500cc NS bolus for euvolemia   -PRN oxy and tylenol for HA    -PRN zofran given for nausea   -Type & screen updated             Is this a stroke patient? yes- Stroke booklet reviewed with patient and family, risk factors identified for patient and stroke booklet remains at bedside for ongoing education.     Neuro:  [place] Coma Scale  Best Eye Response: 4-->(E4) spontaneous  Best Motor Response: 6-->(M6) obeys commands  Best Verbal Response: 5-->(V5) oriented  [place] Coma Scale Score: 15  Assessment Qualifiers: patient not sedated/intubated  Pupil PERRLA: yes     24hr Temp:  [97.4 °F (36.3 °C)-98.3 °F (36.8 °C)]     CV:   Rhythm: normal sinus rhythm  BP goals:   SBP < 140  MAP > 65    Resp:           Plan: N/A    GI/:     Diet/Nutrition Received: NPO  Last Bowel Movement: 12/08/23  Voiding Characteristics: voids spontaneously without difficulty    Intake/Output Summary (Last 24 hours) at 12/11/2023 0622  Last data filed at 12/11/2023 0602  Gross per 24 hour   Intake 2346.82 ml   Output 2350 ml   Net -3.18 ml          Labs/Accuchecks:  Recent Labs   Lab 12/11/23  0230   WBC 4.88   RBC 4.27   HGB 13.1   HCT 39.0         Recent Labs   Lab 12/11/23  0230      K 3.7   CO2 20*      BUN 11   CREATININE 0.8   ALKPHOS 70   ALT 12   AST 14   BILITOT 0.6      Recent Labs   Lab 12/08/23  1214   INR 1.0   APTT 27.0    No results for input(s): "CPK", "CPKMB", "TROPONINI", "MB" in the last 168 hours.    Electrolytes: No replacement orders  Accuchecks: none    Gtts:   nicardipine Stopped (12/08/23 5572) "       LDA/Wounds:  Lines/Drains/Airways       Peripheral Intravenous Line  Duration                  Peripheral IV - Single Lumen 12/08/23 0931 20 G Anterior;Left;Proximal Forearm 2 days         Peripheral IV - Single Lumen 12/08/23 2037 18 G Right Antecubital 2 days                  Wounds: No  Wound care consulted: No

## 2023-12-11 NOTE — PROGRESS NOTES
[facility] - Neuro Critical Care  Neurosurgery  Progress Note    Subjective:     History of Present Illness: 59 yo female with history of HTN transferred from [facility] for NSGY evaluation of subarachnoid hemorrhage. She presented to the emergency room with complaint of acute onset headache while working out this am. States that she took Excedrin migraine which helped a little bit.  States that she has never had a headache like this before.  Patient's systolic blood pressures greater than 200 upon arrival.  CTH at OSH shows small hemorrhage along the anterior and right lateral aspects of the katiuska, no IVH. Pt is not on blood thinners.      Post-Op Info:  Procedure(s) (LRB):  ANGIOGRAM-CEREBRAL; Need Anesthesia; Dr. Byron Iniguez (N/A)   1 Day Post-Op   Interval History: NAEON. Neuro exam stable.    Medications:  Continuous Infusions:   sodium chloride 0.9%       Scheduled Meds:   amLODIPine  5 mg Oral Daily    gabapentin  300 mg Oral TID    levETIRAcetam  500 mg Oral BID    methocarbamoL  500 mg Oral QID    niMODipine  60 mg Oral Q4H     PRN Meds:acetaminophen, labetalol, magnesium oxide, magnesium oxide, melatonin, ondansetron, oxyCODONE, potassium bicarbonate, potassium bicarbonate, potassium bicarbonate, potassium, sodium phosphates, potassium, sodium phosphates, potassium, sodium phosphates     Review of Systems  Objective:     Weight: 64.9 kg (143 lb)  Body mass index is 24.55 kg/m².  Vital Signs (Most Recent):  Temp: 97.6 °F (36.4 °C) (12/11/23 1102)  Pulse: 75 (12/11/23 1102)  Resp: 16 (12/11/23 1102)  BP: 133/64 (12/11/23 1102)  SpO2: 98 % (12/11/23 1102) Vital Signs (24h Range):  Temp:  [97.4 °F (36.3 °C)-98 °F (36.7 °C)] 97.6 °F (36.4 °C)  Pulse:  [64-83] 75  Resp:  [10-20] 16  SpO2:  [94 %-100 %] 98 %  BP: (102-138)/(55-69) 133/64     Date 12/11/23 0700 - 12/12/23 0659   Shift 5023-1911 [id number] [id number] 24 Hour Total   INTAKE   Shift Total(mL/kg)       OUTPUT   Urine(mL/kg/hr) 500   500   Shift  "Total(mL/kg) 500(7.7)   500(7.7)   Weight (kg) 64.9 64.9 64.9 64.9                               Physical Exam   Aox3. E4V5M6  PERRL, EOMI  CN II-XII intact grossly.  Face Symmetric, tongue midline, symmetric shoulder shrug.  BUE 5/5, BLE 5/5  Sensation intact throughout.  No drift.      Neurosurgery Physical Exam    Significant Labs:  Recent Labs   Lab 12/10/23  0207 12/11/23  0230   * 81    137   K 3.8 3.7    107   CO2 21* 20*   BUN 12 11   CREATININE 0.9 0.8   CALCIUM 8.9 9.0   MG 1.9 1.8     Recent Labs   Lab 12/10/23  0207 12/11/23  0230   WBC 5.46 4.88   HGB 13.1 13.1   HCT 39.1 39.0    242     No results for input(s): "LABPT", "INR", "APTT" in the last 48 hours.  Microbiology Results (last 7 days)       ** No results found for the last 168 hours. **          All pertinent labs from the last 24 hours have been reviewed.    Significant Diagnostics:  I have reviewed and interpreted all pertinent imaging results/findings within the past 24 hours.  Assessment/Plan:     * Nontraumatic subarachnoid hemorrhage  57 yo female with history of HTN who presents with nontraumatic prepontine subarachnoid hemorrhage, HH2F2    -Admit to NCC  -Q1h neuro checks  -Imaging and Diagnostics reviewed  -CTA shows stable subarachnoid hemorrhage.  No hydrocephalus. No intracranial aneurysm or AVM is identified.  -SBP<140  -HOB @30  -Keppra 500mg BID x 7 days  -TCDs daily  x 14d  -Nimotop 60q4h x 21d  -Eunatremic  -Continue to follow clinically and notify NSGY with any decline in neuro status.     Dispo: ongoing    Discussed with Dr. Ron Drake MD  Neurosurgery  [facility] - Neuro Critical Care  "

## 2023-12-11 NOTE — SUBJECTIVE & OBJECTIVE
Interval History:  Post SAH day 3. NAEO. TCDs stable. To go for cerebral angiogram today. Headache pain controlled w/ current pain regimen.    Review of Systems   Constitutional:  Negative for fatigue and fever.   HENT: Negative.     Eyes:  Positive for photophobia.   Respiratory: Negative.     Cardiovascular:  Negative for chest pain and palpitations.   Gastrointestinal: Negative.    Endocrine: Negative.    Genitourinary: Negative.    Musculoskeletal: Negative.  Negative for neck stiffness.   Skin: Negative.    Neurological:  Positive for headaches. Negative for dizziness, seizures, syncope, facial asymmetry, speech difficulty, weakness, light-headedness and numbness.   Psychiatric/Behavioral: Negative.         Objective:     Vitals:  Temp: 97.6 °F (36.4 °C)  Pulse: 69  Rhythm: normal sinus rhythm  BP: 127/64  MAP (mmHg): 90  Resp: 18  SpO2: 96 %    Temp  Min: 97.4 °F (36.3 °C)  Max: 98 °F (36.7 °C)  Pulse  Min: 64  Max: 83  BP  Min: 102/59  Max: 138/65  MAP (mmHg)  Min: 77  Max: 95  Resp  Min: 10  Max: 20  SpO2  Min: 94 %  Max: 100 %    12/10 0701 - 12/11 0700  In: 2602 [P.O.:170]  Out: 2850 [Urine:2850]            Physical Exam  Constitutional:       Appearance: She is not toxic-appearing.   HENT:      Head: Normocephalic.      Nose: Nose normal.      Mouth/Throat:      Mouth: Mucous membranes are moist.   Eyes:      Pupils: Pupils are equal, round, and reactive to light.   Cardiovascular:      Rate and Rhythm: Normal rate and regular rhythm.      Heart sounds: No murmur heard.  Pulmonary:      Effort: Pulmonary effort is normal.      Breath sounds: No stridor. No wheezing.   Abdominal:      General: Abdomen is flat. There is no distension.      Palpations: Abdomen is soft.      Tenderness: There is no abdominal tenderness.   Musculoskeletal:         General: Normal range of motion.      Cervical back: Normal range of motion.      Right lower leg: No edema.      Left lower leg: No edema.   Skin:     General: Skin  is warm.   Neurological:      Mental Status: She is alert.      Comments: GCS 15  PEERL  5/5 BL UE and LE  EOMI  Sensation in tact  Cranial nerves in tact   Psychiatric:         Mood and Affect: Mood normal.         Behavior: Behavior normal.         Thought Content: Thought content normal.         Judgment: Judgment normal.              Medications:  Continuoussodium chloride 0.9%, Last Rate: 50 mL/hr at 12/11/23 1245    ScheduledamLODIPine, 5 mg, Daily  gabapentin, 300 mg, TID  levETIRAcetam, 500 mg, BID  methocarbamoL, 500 mg, QID  niMODipine, 60 mg, Q4H    PRNacetaminophen, 650 mg, Q4H PRN  labetalol, 10 mg, Q15 Min PRN  magnesium oxide, 800 mg, PRN  magnesium oxide, 800 mg, PRN  melatonin, 6 mg, Nightly PRN  ondansetron, 8 mg, Q8H PRN  oxyCODONE, 5 mg, Q4H PRN  potassium bicarbonate, 35 mEq, PRN  potassium bicarbonate, 50 mEq, PRN  potassium bicarbonate, 60 mEq, PRN  potassium, sodium phosphates, 2 packet, PRN  potassium, sodium phosphates, 2 packet, PRN  potassium, sodium phosphates, 2 packet, PRN      Today I personally reviewed pertinent medications, lines/drains/airways, imaging, cardiology results, laboratory results, microbiology results, notably:    Diet  Diet NPO  Diet NPO

## 2023-12-11 NOTE — PT/OT/SLP PROGRESS
Speech Language Pathology      Laura San Juan  MRN: [id number]    Patient not seen today secondary to Nursing hold (Comment) (npo for procedure). Will follow-up 12/12.

## 2023-12-12 LAB
ALBUMIN SERPL BCP-MCNC: 3.5 G/DL (ref 3.5–5.2)
ALP SERPL-CCNC: 80 U/L (ref 55–135)
ALT SERPL W/O P-5'-P-CCNC: 16 U/L (ref 10–44)
ANION GAP SERPL CALC-SCNC: 11 MMOL/L (ref 8–16)
AST SERPL-CCNC: 20 U/L (ref 10–40)
BASOPHILS # BLD AUTO: 0.07 K/UL (ref 0–0.2)
BASOPHILS NFR BLD: 1.2 % (ref 0–1.9)
BILIRUB SERPL-MCNC: 0.6 MG/DL (ref 0.1–1)
BUN SERPL-MCNC: 12 MG/DL (ref 6–20)
CALCIUM SERPL-MCNC: 9.1 MG/DL (ref 8.7–10.5)
CHLORIDE SERPL-SCNC: 105 MMOL/L (ref 95–110)
CO2 SERPL-SCNC: 21 MMOL/L (ref 23–29)
CREAT SERPL-MCNC: 1 MG/DL (ref 0.5–1.4)
DIFFERENTIAL METHOD: NORMAL
EOSINOPHIL # BLD AUTO: 0.3 K/UL (ref 0–0.5)
EOSINOPHIL NFR BLD: 4.2 % (ref 0–8)
ERYTHROCYTE [DISTWIDTH] IN BLOOD BY AUTOMATED COUNT: 11.9 % (ref 11.5–14.5)
EST. GFR  (NO RACE VARIABLE): >60 ML/MIN/1.73 M^2
GLUCOSE SERPL-MCNC: 91 MG/DL (ref 70–110)
HCT VFR BLD AUTO: 37 % (ref 37–48.5)
HGB BLD-MCNC: 12.7 G/DL (ref 12–16)
IMM GRANULOCYTES # BLD AUTO: 0.01 K/UL (ref 0–0.04)
IMM GRANULOCYTES NFR BLD AUTO: 0.2 % (ref 0–0.5)
LYMPHOCYTES # BLD AUTO: 1.6 K/UL (ref 1–4.8)
LYMPHOCYTES NFR BLD: 26.5 % (ref 18–48)
MAGNESIUM SERPL-MCNC: 1.9 MG/DL (ref 1.6–2.6)
MCH RBC QN AUTO: 30.6 PG (ref 27–31)
MCHC RBC AUTO-ENTMCNC: 34.3 G/DL (ref 32–36)
MCV RBC AUTO: 89 FL (ref 82–98)
MONOCYTES # BLD AUTO: 0.6 K/UL (ref 0.3–1)
MONOCYTES NFR BLD: 10.3 % (ref 4–15)
NEUTROPHILS # BLD AUTO: 3.4 K/UL (ref 1.8–7.7)
NEUTROPHILS NFR BLD: 57.6 % (ref 38–73)
NRBC BLD-RTO: 0 /100 WBC
PHOSPHATE SERPL-MCNC: 4 MG/DL (ref 2.7–4.5)
PLATELET # BLD AUTO: 257 K/UL (ref 150–450)
PMV BLD AUTO: 9.3 FL (ref 9.2–12.9)
POTASSIUM SERPL-SCNC: 3.9 MMOL/L (ref 3.5–5.1)
PROT SERPL-MCNC: 6.8 G/DL (ref 6–8.4)
RBC # BLD AUTO: 4.15 M/UL (ref 4–5.4)
SODIUM SERPL-SCNC: 137 MMOL/L (ref 136–145)
WBC # BLD AUTO: 5.93 K/UL (ref 3.9–12.7)

## 2023-12-12 PROCEDURE — 85025 COMPLETE CBC W/AUTO DIFF WBC: CPT

## 2023-12-12 PROCEDURE — 20600001 HC STEP DOWN PRIVATE ROOM

## 2023-12-12 PROCEDURE — 80053 COMPREHEN METABOLIC PANEL: CPT

## 2023-12-12 PROCEDURE — 99233 PR SUBSEQUENT HOSPITAL CARE,LEVL III: ICD-10-PCS | Mod: ,,, | Performed by: PSYCHIATRY & NEUROLOGY

## 2023-12-12 PROCEDURE — 25000003 PHARM REV CODE 250

## 2023-12-12 PROCEDURE — 92523 SPEECH SOUND LANG COMPREHEN: CPT

## 2023-12-12 PROCEDURE — 99233 SBSQ HOSP IP/OBS HIGH 50: CPT | Mod: ,,, | Performed by: STUDENT IN AN ORGANIZED HEALTH CARE EDUCATION/TRAINING PROGRAM

## 2023-12-12 PROCEDURE — 83735 ASSAY OF MAGNESIUM: CPT

## 2023-12-12 PROCEDURE — 99233 SBSQ HOSP IP/OBS HIGH 50: CPT | Mod: ,,, | Performed by: PSYCHIATRY & NEUROLOGY

## 2023-12-12 PROCEDURE — 84100 ASSAY OF PHOSPHORUS: CPT

## 2023-12-12 PROCEDURE — 63600175 PHARM REV CODE 636 W HCPCS: Performed by: NURSE PRACTITIONER

## 2023-12-12 PROCEDURE — 99233 PR SUBSEQUENT HOSPITAL CARE,LEVL III: ICD-10-PCS | Mod: ,,, | Performed by: STUDENT IN AN ORGANIZED HEALTH CARE EDUCATION/TRAINING PROGRAM

## 2023-12-12 PROCEDURE — 25000003 PHARM REV CODE 250: Performed by: PSYCHIATRY & NEUROLOGY

## 2023-12-12 RX ORDER — ENOXAPARIN SODIUM 100 MG/ML
40 INJECTION SUBCUTANEOUS EVERY 24 HOURS
Status: DISCONTINUED | OUTPATIENT
Start: 2023-12-12 | End: 2023-12-17 | Stop reason: HOSPADM

## 2023-12-12 RX ADMIN — ACETAMINOPHEN 650 MG: 325 TABLET ORAL at 09:12

## 2023-12-12 RX ADMIN — METHOCARBAMOL 500 MG: 500 TABLET ORAL at 08:12

## 2023-12-12 RX ADMIN — METHOCARBAMOL 500 MG: 500 TABLET ORAL at 04:12

## 2023-12-12 RX ADMIN — GABAPENTIN 300 MG: 300 CAPSULE ORAL at 08:12

## 2023-12-12 RX ADMIN — NIMODIPINE 60 MG: 30 CAPSULE, LIQUID FILLED ORAL at 05:12

## 2023-12-12 RX ADMIN — ONDANSETRON 8 MG: 8 TABLET, ORALLY DISINTEGRATING ORAL at 08:12

## 2023-12-12 RX ADMIN — METHOCARBAMOL 500 MG: 500 TABLET ORAL at 01:12

## 2023-12-12 RX ADMIN — ACETAMINOPHEN 650 MG: 325 TABLET ORAL at 02:12

## 2023-12-12 RX ADMIN — ENOXAPARIN SODIUM 40 MG: 40 INJECTION SUBCUTANEOUS at 04:12

## 2023-12-12 RX ADMIN — NIMODIPINE 60 MG: 30 CAPSULE, LIQUID FILLED ORAL at 10:12

## 2023-12-12 RX ADMIN — AMLODIPINE BESYLATE 5 MG: 5 TABLET ORAL at 08:12

## 2023-12-12 RX ADMIN — ONDANSETRON 8 MG: 8 TABLET, ORALLY DISINTEGRATING ORAL at 02:12

## 2023-12-12 RX ADMIN — GABAPENTIN 300 MG: 300 CAPSULE ORAL at 02:12

## 2023-12-12 RX ADMIN — NIMODIPINE 60 MG: 30 CAPSULE, LIQUID FILLED ORAL at 02:12

## 2023-12-12 RX ADMIN — OXYCODONE HYDROCHLORIDE 5 MG: 5 TABLET ORAL at 08:12

## 2023-12-12 RX ADMIN — LEVETIRACETAM 500 MG: 500 TABLET, FILM COATED ORAL at 08:12

## 2023-12-12 NOTE — SUBJECTIVE & OBJECTIVE
Neurologic Chief Complaint: HA and photophobia    Subjective:     Interval History: Patient is seen for follow-up neurological assessment and treatment recommendations: ANALIA. Neuro exam stable. Patient continues to have intermittent headaches, neck pain, and nausea. Patient reports relief with zofran. Tylenol was given for headache and neck pain. Daily TCDs in progress, no evidence of vasospasms noted for today's TCD. Patient stable for NPU stepdown.    HPI, Past Medical, Family, and Social History remains the same as documented in the initial encounter.     Review of Systems   Eyes:  Positive for photophobia.   Neurological:  Positive for headaches. Negative for facial asymmetry, speech difficulty, weakness and numbness.     Scheduled Meds:   amLODIPine  5 mg Oral Daily    enoxparin  40 mg Subcutaneous Q24H (prophylaxis, 1700)    gabapentin  300 mg Oral TID    methocarbamoL  500 mg Oral QID     Continuous Infusions:      PRN Meds:acetaminophen, labetalol, magnesium oxide, magnesium oxide, melatonin, ondansetron, oxyCODONE, potassium bicarbonate, potassium bicarbonate, potassium bicarbonate, potassium, sodium phosphates, potassium, sodium phosphates, potassium, sodium phosphates    Objective:     Vital Signs (Most Recent):  Temp: 98 °F (36.7 °C) (12/12/23 1102)  Pulse: 82 (12/12/23 1402)  Resp: (!) 24 (12/12/23 1402)  BP: 128/61 (12/12/23 1402)  SpO2: 98 % (12/12/23 1402)  BP Location: Left arm    Vital Signs Range (Last 24H):  Temp:  [97.5 °F (36.4 °C)-98.3 °F (36.8 °C)]   Pulse:  [66-82]   Resp:  [10-24]   BP: (104-165)/(55-86)   SpO2:  [95 %-100 %]   BP Location: Left arm       Physical Exam  Vitals reviewed.   Constitutional:       General: She is not in acute distress.  HENT:      Head: Normocephalic and atraumatic.      Mouth/Throat:      Mouth: Mucous membranes are moist.   Eyes:      Extraocular Movements: Extraocular movements intact.   Cardiovascular:      Rate and Rhythm: Normal rate.   Pulmonary:       "Effort: Pulmonary effort is normal. No respiratory distress.   Skin:     General: Skin is warm and dry.   Neurological:      Mental Status: She is alert and oriented to person, place, and time.              Neurological Exam:   LOC: alert  Attention Span: Good   Language: No aphasia  Articulation: No dysarthria  Orientation: Person, Place, Time   Visual Fields: Full  EOM (CN III, IV, VI): Full/intact  Facial Movement (CN VII): Symmetric facial expression    Motor: Arm left  Normal 5/5  Leg left  Normal 5/5  Arm right  Normal 5/5  Leg right Normal 5/5  Cerebellum: No evidence of appendicular or axial ataxia  Sensation: Intact to light touch  Tone: Normal tone throughout    Laboratory:  CMP:   Recent Labs   Lab 12/12/23  0235   CALCIUM 9.1   ALBUMIN 3.5   PROT 6.8      K 3.9   CO2 21*      BUN 12   CREATININE 1.0   ALKPHOS 80   ALT 16   AST 20   BILITOT 0.6       CBC:   Recent Labs   Lab 12/12/23  0235   WBC 5.93   RBC 4.15   HGB 12.7   HCT 37.0      MCV 89   MCH 30.6   MCHC 34.3       Lipid Panel:   Recent Labs   Lab 12/08/23  1403   CHOL 181   LDLCALC 128.4   HDL 46   TRIG 33       Coagulation:   Recent Labs   Lab 12/08/23  1214   INR 1.0   APTT 27.0       Platelet Aggregation Study: No results for input(s): "PLTAGG", "PLTAGINTERP", "PLTAGREGLACO", "ADPPLTAGGREG" in the last 168 hours.  Hgb A1C:   Recent Labs   Lab 12/08/23  1403   HGBA1C 5.4       TSH:   Recent Labs   Lab 12/08/23  1403   TSH 0.291*         Diagnostic Results     Brain Imaging:   [facility] 12/8/2023 @1657  Impression:     Stable appearance of the subarachnoid hemorrhage.  No hydrocephalus.     Nonspecific prominent white matter changes.     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  Patchy hypodense areas involving the bilateral subcortical cerebral white matter, nonspecific and possibly reflecting chronic small " vessel ischemic change.        Vessel Imaging:  [facility] 12/8/2023   Impression: Stable subarachnoid hemorrhage.  No hydrocephalus.  Nonspecific presumed chronic white matter changes again identified.  No intracranial aneurysm or AVM is identified.        Cardiac Imaging:  [facility] 12/9/23    Left Ventricle: The left ventricle is normal in size. Normal wall thickness. Normal wall motion. There is normal systolic function. Ejection fraction by visual approximation is 65%. There is normal diastolic function.    Right Ventricle: Normal right ventricular cavity size. Wall thickness is normal. Right ventricle wall motion  is normal. Systolic function is normal.    Pulmonary Artery: The estimated pulmonary artery systolic pressure is 18 mmHg.    IVC/SVC: Normal venous pressure at 3 mmHg.

## 2023-12-12 NOTE — PLAN OF CARE
"[facility] Care Plan    POC reviewed with Laura La Grange and family at 1400. Pt verbalized understanding. Questions and concerns addressed. No acute events today. Pt progressing toward goals. Will continue to monitor. See below and flowsheets for full assessment and VS info.     Angio completed in IR today  PRN labetalol x1  PRN oxy utilized for headache pain control  R groin site CDI        Is this a stroke patient? yes- Stroke booklet reviewed with patient and family, risk factors identified for patient and stroke booklet remains at bedside for ongoing education.     Neuro:  Balbir Coma Scale  Best Eye Response: 4-->(E4) spontaneous  Best Motor Response: 6-->(M6) obeys commands  Best Verbal Response: 5-->(V5) oriented  Balbir Coma Scale Score: 15  Assessment Qualifiers: patient not sedated/intubated  Pupil PERRLA: yes     24 hr Temp:  [97.4 °F (36.3 °C)-98 °F (36.7 °C)]     CV:   Rhythm: normal sinus rhythm  BP goals:   SBP < 140  MAP > 65    Resp:           Plan: N/A    GI/:     Diet/Nutrition Received: NPO (pending angio)  Last Bowel Movement: 12/11/23  Voiding Characteristics: voids spontaneously without difficulty    Intake/Output Summary (Last 24 hours) at 12/11/2023 1914  Last data filed at 12/11/2023 1702  Gross per 24 hour   Intake 1962.93 ml   Output 2200 ml   Net -237.07 ml     Unmeasured Output  Stool Occurrence: 1    Labs/Accuchecks:  Recent Labs   Lab 12/11/23  0230   WBC 4.88   RBC 4.27   HGB 13.1   HCT 39.0         Recent Labs   Lab 12/11/23  0230      K 3.7   CO2 20*      BUN 11   CREATININE 0.8   ALKPHOS 70   ALT 12   AST 14   BILITOT 0.6      Recent Labs   Lab 12/08/23  1214   INR 1.0   APTT 27.0    No results for input(s): "CPK", "CPKMB", "TROPONINI", "MB" in the last 168 hours.    Electrolytes: N/A - electrolytes WDL  Accuchecks: none    Gtts:   sodium chloride 0.9% 50 mL/hr at 12/11/23 1245       LDA/Wounds:  Lines/Drains/Airways       Peripheral Intravenous Line  Duration       "            Peripheral IV - Single Lumen 12/08/23 0931 20 G Anterior;Left;Proximal Forearm 3 days         Peripheral IV - Single Lumen 12/08/23 2037 18 G Right Antecubital 2 days                  Wounds: No  Wound care consulted: No     Problem: Adult Inpatient Plan of Care  Goal: Plan of Care Review  Outcome: Ongoing, Progressing  Goal: Patient-Specific Goal (Individualized)  Outcome: Ongoing, Progressing     Problem: Adjustment to Illness (Stroke, Hemorrhagic)  Goal: Optimal Coping  Outcome: Ongoing, Progressing     Problem: Cognitive Impairment (Stroke, Hemorrhagic)  Goal: Optimal Cognitive Function  Outcome: Ongoing, Progressing     Problem: Sensorimotor Impairment (Stroke, Hemorrhagic)  Goal: Improved Sensorimotor Function  Outcome: Ongoing, Progressing

## 2023-12-12 NOTE — SUBJECTIVE & OBJECTIVE
Interval History:  Post SAH day 4. NAEO. Normal cerebral angiogram. Will go for CTA Friday. If normal, can discharge home. Discontinued Keppra and Nimodipine.     Review of Systems   Constitutional:  Negative for fatigue and fever.   HENT: Negative.     Eyes:  Positive for photophobia.   Respiratory: Negative.     Cardiovascular:  Negative for chest pain and palpitations.   Gastrointestinal: Negative.    Endocrine: Negative.    Genitourinary: Negative.    Musculoskeletal: Negative.  Negative for neck stiffness.   Skin: Negative.    Neurological:  Positive for headaches. Negative for dizziness, seizures, syncope, facial asymmetry, speech difficulty, weakness, light-headedness and numbness.   Psychiatric/Behavioral: Negative.         Objective:     Vitals:  Temp: 98.3 °F (36.8 °C)  Pulse: 75  Rhythm: normal sinus rhythm  BP: 133/65  MAP (mmHg): 92  Resp: 18  SpO2: 98 %    Temp  Min: 97.5 °F (36.4 °C)  Max: 98.3 °F (36.8 °C)  Pulse  Min: 66  Max: 82  BP  Min: 104/55  Max: 165/86  MAP (mmHg)  Min: 76  Max: 116  Resp  Min: 9  Max: 21  ETCO2 (mmHg)  Min: 34 mmHg  Max: 43 mmHg  SpO2  Min: 95 %  Max: 100 %    12/11 0701 - 12/12 0700  In: 2689.2 [P.O.:2375; I.V.:314.2]  Out: 2800 [Urine:2800]   Unmeasured Output  Stool Occurrence: 1        Physical Exam  Constitutional:       Appearance: She is not toxic-appearing.   HENT:      Head: Normocephalic.      Nose: Nose normal.      Mouth/Throat:      Mouth: Mucous membranes are moist.   Eyes:      Pupils: Pupils are equal, round, and reactive to light.   Cardiovascular:      Rate and Rhythm: Normal rate and regular rhythm.      Heart sounds: No murmur heard.  Pulmonary:      Effort: Pulmonary effort is normal.      Breath sounds: No stridor. No wheezing.   Abdominal:      General: Abdomen is flat. There is no distension.      Palpations: Abdomen is soft.      Tenderness: There is no abdominal tenderness.   Musculoskeletal:         General: Normal range of motion.      Cervical  back: Normal range of motion.      Right lower leg: No edema.      Left lower leg: No edema.   Skin:     General: Skin is warm.   Neurological:      Mental Status: She is alert.      Comments: GCS 15  PEERL  5/5 BL UE and LE  EOMI  Sensation in tact  Cranial nerves in tact   Psychiatric:         Mood and Affect: Mood normal.         Behavior: Behavior normal.         Thought Content: Thought content normal.         Judgment: Judgment normal.              Medications:  Continuous ScheduledamLODIPine, 5 mg, Daily  gabapentin, 300 mg, TID  levETIRAcetam, 500 mg, BID  methocarbamoL, 500 mg, QID  niMODipine, 60 mg, Q4H    PRNacetaminophen, 650 mg, Q4H PRN  labetalol, 10 mg, Q15 Min PRN  magnesium oxide, 800 mg, PRN  magnesium oxide, 800 mg, PRN  melatonin, 6 mg, Nightly PRN  ondansetron, 8 mg, Q8H PRN  oxyCODONE, 5 mg, Q4H PRN  potassium bicarbonate, 35 mEq, PRN  potassium bicarbonate, 50 mEq, PRN  potassium bicarbonate, 60 mEq, PRN  potassium, sodium phosphates, 2 packet, PRN  potassium, sodium phosphates, 2 packet, PRN  potassium, sodium phosphates, 2 packet, PRN      Today I personally reviewed pertinent medications, lines/drains/airways, imaging, cardiology results, laboratory results, microbiology results, notably:    Diet  Diet Adult Regular (IDDSI Level 7) Standard Tray  Diet Adult Regular (IDDSI Level 7) Standard Tray

## 2023-12-12 NOTE — SUBJECTIVE & OBJECTIVE
"Interval History: 12/12/2023 NAEON Neuro exam stable. Vasospasm watch     Medications:  Continuous Infusions:  Scheduled Meds:   amLODIPine  5 mg Oral Daily    gabapentin  300 mg Oral TID    levETIRAcetam  500 mg Oral BID    methocarbamoL  500 mg Oral QID    niMODipine  60 mg Oral Q4H     PRN Meds:acetaminophen, labetalol, magnesium oxide, magnesium oxide, melatonin, ondansetron, oxyCODONE, potassium bicarbonate, potassium bicarbonate, potassium bicarbonate, potassium, sodium phosphates, potassium, sodium phosphates, potassium, sodium phosphates     Review of Systems  Objective:     Weight: 64.9 kg (143 lb)  Body mass index is 24.55 kg/m².  Vital Signs (Most Recent):  Temp: 98.3 °F (36.8 °C) (12/12/23 0702)  Pulse: 75 (12/12/23 0602)  Resp: 18 (12/12/23 0826)  BP: 133/65 (12/12/23 0826)  SpO2: 98 % (12/12/23 0602) Vital Signs (24h Range):  Temp:  [97.5 °F (36.4 °C)-98.3 °F (36.8 °C)] 98.3 °F (36.8 °C)  Pulse:  [66-82] 75  Resp:  [9-21] 18  SpO2:  [95 %-100 %] 98 %  BP: (104-165)/(55-90) 133/65                                 Physical Exam     Aox3. E4V5M6  PERRL, EOMI  CN II-XII intact grossly.  Face Symmetric, tongue midline, symmetric shoulder shrug.  BUE 5/5, BLE 5/5  Sensation intact throughout.  No drift.    Neurosurgery Physical Exam    Significant Labs:  Recent Labs   Lab 12/11/23 0230 12/12/23 0235   GLU 81 91    137   K 3.7 3.9    105   CO2 20* 21*   BUN 11 12   CREATININE 0.8 1.0   CALCIUM 9.0 9.1   MG 1.8 1.9     Recent Labs   Lab 12/11/23 0230 12/12/23 0235   WBC 4.88 5.93   HGB 13.1 12.7   HCT 39.0 37.0    257     No results for input(s): "LABPT", "INR", "APTT" in the last 48 hours.  Microbiology Results (last 7 days)       ** No results found for the last 168 hours. **          All pertinent labs from the last 24 hours have been reviewed.    Significant Diagnostics:  I have reviewed and interpreted all pertinent imaging results/findings within the past 24 hours.  "

## 2023-12-12 NOTE — PROGRESS NOTES
[facility] - Neuro Critical Care  Neurosurgery  Progress Note    Subjective:     History of Present Illness: 57 yo female with history of HTN transferred from [facility] for NSGY evaluation of subarachnoid hemorrhage. She presented to the emergency room with complaint of acute onset headache while working out this am. States that she took Excedrin migraine which helped a little bit.  States that she has never had a headache like this before.  Patient's systolic blood pressures greater than 200 upon arrival.  CTH at OSH shows small hemorrhage along the anterior and right lateral aspects of the katiuska, no IVH. Pt is not on blood thinners.      Post-Op Info:  Procedure(s) (LRB):  ANGIOGRAM-CEREBRAL; Need Anesthesia; Dr. Byron Iniguez (N/A)   2 Days Post-Op   Interval History: 12/12/2023 NAEON Neuro exam stable. Vasospasm watch     Medications:  Continuous Infusions:  Scheduled Meds:   amLODIPine  5 mg Oral Daily    gabapentin  300 mg Oral TID    levETIRAcetam  500 mg Oral BID    methocarbamoL  500 mg Oral QID    niMODipine  60 mg Oral Q4H     PRN Meds:acetaminophen, labetalol, magnesium oxide, magnesium oxide, melatonin, ondansetron, oxyCODONE, potassium bicarbonate, potassium bicarbonate, potassium bicarbonate, potassium, sodium phosphates, potassium, sodium phosphates, potassium, sodium phosphates     Review of Systems  Objective:     Weight: 64.9 kg (143 lb)  Body mass index is 24.55 kg/m².  Vital Signs (Most Recent):  Temp: 98.3 °F (36.8 °C) (12/12/23 0702)  Pulse: 75 (12/12/23 0602)  Resp: 18 (12/12/23 0826)  BP: 133/65 (12/12/23 0826)  SpO2: 98 % (12/12/23 0602) Vital Signs (24h Range):  Temp:  [97.5 °F (36.4 °C)-98.3 °F (36.8 °C)] 98.3 °F (36.8 °C)  Pulse:  [66-82] 75  Resp:  [9-21] 18  SpO2:  [95 %-100 %] 98 %  BP: (104-165)/(55-90) 133/65                                 Physical Exam     Aox3. E4V5M6  PERRL, EOMI  CN II-XII intact grossly.  Face Symmetric, tongue midline, symmetric shoulder shrug.  BUE 5/5,  "BLE 5/5  Sensation intact throughout.  No drift.    Neurosurgery Physical Exam    Significant Labs:  Recent Labs   Lab 12/11/23  0230 12/12/23  0235   GLU 81 91    137   K 3.7 3.9    105   CO2 20* 21*   BUN 11 12   CREATININE 0.8 1.0   CALCIUM 9.0 9.1   MG 1.8 1.9     Recent Labs   Lab 12/11/23  0230 12/12/23  0235   WBC 4.88 5.93   HGB 13.1 12.7   HCT 39.0 37.0    257     No results for input(s): "LABPT", "INR", "APTT" in the last 48 hours.  Microbiology Results (last 7 days)       ** No results found for the last 168 hours. **          All pertinent labs from the last 24 hours have been reviewed.    Significant Diagnostics:  I have reviewed and interpreted all pertinent imaging results/findings within the past 24 hours.  Assessment/Plan:     * Nontraumatic subarachnoid hemorrhage  57 yo female with history of HTN who presents with nontraumatic prepontine subarachnoid hemorrhage, HH2F2    -Admit to NCC  -Q1h neuro checks  -Imaging and Diagnostics reviewed  -CTA shows stable subarachnoid hemorrhage.  No hydrocephalus. No intracranial aneurysm or AVM is identified.  -DSA normal  -SBP<140  -HOB @30  -Keppra 500mg BID x 7 days  -TCDs daily  x 14d  -Nimotop 60q4h x 21d  -Eunatremic  -Continue to follow clinically and notify NSGY with any decline in neuro status.     Dispo: ongoing    Discussed with Dr. Hira Drake MD  Neurosurgery  [facility] - Neuro Critical Care  "

## 2023-12-12 NOTE — PROGRESS NOTES
[facility] - Neuro Critical Care  Vascular Neurology  Comprehensive Stroke Center  Progress Note    Assessment/Plan:     * Nontraumatic subarachnoid hemorrhage  Laura Newell is a 58 y.o. female with a significant medical history of asthma who presents to the hospital as a transfer from [facility] for evaluation of SAH.  She was hypertensive on presentation to the OSH ED, 200s/100s. CT with perimesencephalic SAH. CTA negative for aneurysm or other cerebrovascular abnormalities. TTE EF 65%, no LAE, normal wall thickness noted. DSA unremarkable for vascular abnormalities.    NAEON. Neuro exam stable. Patient continues to have intermittent headaches, neck pain, and nausea. Patient reports relief with zofran. Tylenol was given for headache and neck pain. Daily TCDs in progress, no evidence of vasospasms noted for today's TCD. Patient stable for NPU stepdown.      Antithrombotics for secondary stroke prevention: Antiplatelets: None: Intracerebral Hemorrhage    Statins for secondary stroke prevention and hyperlipidemia, if present:   Statins: None: Reason: SAH    Aggressive risk factor modification: None     Rehab efforts: The patient has been evaluated by a stroke team provider and the therapy needs have been fully considered based off the presenting complaints and exam findings. The following therapy evaluations are needed: PT evaluate and treat, OT evaluate and treat    Diagnostics ordered/pending: Other: Daily TCDs    VTE prophylaxis: Mechanical prophylaxis: Place SCDs  None: Reason for No Pharmacological VTE Prophylaxis: SAH    BP parameters: SAH: SBP<140        Elevated cholesterol  -Stroke risk factor.  Noted in the patient's record as of 6/2020.  Patient not currently prescribed statin.  -Lipid panel WNL  -statin not indicated    Essential hypertension  -Stroke risk factor.  Noted in the patient's record as of 8/2019.  Patient not currently prescribed medications.  -Initial SBP 200s.  -SBP  <140  -Initially on cardene, currently off at this time  -on amlodipine 5 mg daily  -PRN labetalol  -Monitor for need to d/c on home BP meds         12/9/23 patient reports improvement in HA, rates pain 2/10, persistent photophobia, exam nonfocal, CTA negative for aneurysm or other cerebrovascular abnormality, DSA pending, TCD pending  12/12/23: ALONEON. Neuro exam stable. Patient continues to have intermittent headaches, neck pain, and nausea. Patient reports relief with zofran. Tylenol was given for headache and neck pain. Daily TCDs in progress, no evidence of vasospasms noted for today's TCD. Patient stable for NPU stepdown.    STROKE DOCUMENTATION        NIH Scale:  1a. Level of Consciousness: 0-->Alert, keenly responsive  1b. LOC Questions: 0-->Answers both questions correctly  1c. LOC Commands: 0-->Performs both tasks correctly  2. Best Gaze: 0-->Normal  3. Visual: 0-->No visual loss  4. Facial Palsy: 0-->Normal symmetrical movements  5a. Motor Arm, Left: 0-->No drift, limb holds 90 (or 45) degrees for full 10 secs  5b. Motor Arm, Right: 0-->No drift, limb holds 90 (or 45) degrees for full 10 secs  6a. Motor Leg, Left: 0-->No drift, leg holds 30 degree position for full 5 secs  6b. Motor Leg, Right: 0-->No drift, leg holds 30 degree position for full 5 secs  7. Limb Ataxia: 0-->Absent  8. Sensory: 0-->Normal, no sensory loss  9. Best Language: 0-->No aphasia, normal  10. Dysarthria: 0-->Normal  11. Extinction and Inattention (formerly Neglect): 0-->No abnormality  Total (NIH Stroke Scale): 0       Modified Hayden Score: 0  [place] Coma Scale:15   ABCD2 Score:    [id number] Score:   HAS -BLED Score:   ICH Score:0  Hunt & Bravo Classification:Grade I     Hemorrhagic change of an Ischemic Stroke: Does this patient have an ischemic stroke with hemorrhagic changes? No     Neurologic Chief Complaint: HA and photophobia    Subjective:     Interval History: Patient is seen for follow-up neurological assessment and  treatment recommendations: NAEON. Neuro exam stable. Patient continues to have intermittent headaches, neck pain, and nausea. Patient reports relief with zofran. Tylenol was given for headache and neck pain. Daily TCDs in progress, no evidence of vasospasms noted for today's TCD. Patient stable for NPU stepdown.    HPI, Past Medical, Family, and Social History remains the same as documented in the initial encounter.     Review of Systems   Eyes:  Positive for photophobia.   Neurological:  Positive for headaches. Negative for facial asymmetry, speech difficulty, weakness and numbness.     Scheduled Meds:   amLODIPine  5 mg Oral Daily    enoxparin  40 mg Subcutaneous Q24H (prophylaxis, 1700)    gabapentin  300 mg Oral TID    methocarbamoL  500 mg Oral QID     Continuous Infusions:      PRN Meds:acetaminophen, labetalol, magnesium oxide, magnesium oxide, melatonin, ondansetron, oxyCODONE, potassium bicarbonate, potassium bicarbonate, potassium bicarbonate, potassium, sodium phosphates, potassium, sodium phosphates, potassium, sodium phosphates    Objective:     Vital Signs (Most Recent):  Temp: 98 °F (36.7 °C) (12/12/23 1102)  Pulse: 82 (12/12/23 1402)  Resp: (!) 24 (12/12/23 1402)  BP: 128/61 (12/12/23 1402)  SpO2: 98 % (12/12/23 1402)  BP Location: Left arm    Vital Signs Range (Last 24H):  Temp:  [97.5 °F (36.4 °C)-98.3 °F (36.8 °C)]   Pulse:  [66-82]   Resp:  [10-24]   BP: (104-165)/(55-86)   SpO2:  [95 %-100 %]   BP Location: Left arm       Physical Exam  Vitals reviewed.   Constitutional:       General: She is not in acute distress.  HENT:      Head: Normocephalic and atraumatic.      Mouth/Throat:      Mouth: Mucous membranes are moist.   Eyes:      Extraocular Movements: Extraocular movements intact.   Cardiovascular:      Rate and Rhythm: Normal rate.   Pulmonary:      Effort: Pulmonary effort is normal. No respiratory distress.   Skin:     General: Skin is warm and dry.   Neurological:      Mental Status: She  "is alert and oriented to person, place, and time.              Neurological Exam:   LOC: alert  Attention Span: Good   Language: No aphasia  Articulation: No dysarthria  Orientation: Person, Place, Time   Visual Fields: Full  EOM (CN III, IV, VI): Full/intact  Facial Movement (CN VII): Symmetric facial expression    Motor: Arm left  Normal 5/5  Leg left  Normal 5/5  Arm right  Normal 5/5  Leg right Normal 5/5  Cerebellum: No evidence of appendicular or axial ataxia  Sensation: Intact to light touch  Tone: Normal tone throughout    Laboratory:  CMP:   Recent Labs   Lab 12/12/23  0235   CALCIUM 9.1   ALBUMIN 3.5   PROT 6.8      K 3.9   CO2 21*      BUN 12   CREATININE 1.0   ALKPHOS 80   ALT 16   AST 20   BILITOT 0.6       CBC:   Recent Labs   Lab 12/12/23  0235   WBC 5.93   RBC 4.15   HGB 12.7   HCT 37.0      MCV 89   MCH 30.6   MCHC 34.3       Lipid Panel:   Recent Labs   Lab 12/08/23  1403   CHOL 181   LDLCALC 128.4   HDL 46   TRIG 33       Coagulation:   Recent Labs   Lab 12/08/23  1214   INR 1.0   APTT 27.0       Platelet Aggregation Study: No results for input(s): "PLTAGG", "PLTAGINTERP", "PLTAGREGLACO", "ADPPLTAGGREG" in the last 168 hours.  Hgb A1C:   Recent Labs   Lab 12/08/23  1403   HGBA1C 5.4       TSH:   Recent Labs   Lab 12/08/23  1403   TSH 0.291*         Diagnostic Results     Brain Imaging:   [facility] 12/8/2023 @1657  Impression:     Stable appearance of the subarachnoid hemorrhage.  No hydrocephalus.     Nonspecific prominent white matter changes.     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  Patchy hypodense areas involving the bilateral subcortical cerebral white matter, nonspecific and possibly reflecting chronic small vessel ischemic change.        Vessel Imaging:  [facility] 12/8/2023   Impression: Stable subarachnoid hemorrhage.  No " hydrocephalus.  Nonspecific presumed chronic white matter changes again identified.  No intracranial aneurysm or AVM is identified.        Cardiac Imaging:  [facility] 12/9/23    Left Ventricle: The left ventricle is normal in size. Normal wall thickness. Normal wall motion. There is normal systolic function. Ejection fraction by visual approximation is 65%. There is normal diastolic function.    Right Ventricle: Normal right ventricular cavity size. Wall thickness is normal. Right ventricle wall motion  is normal. Systolic function is normal.    Pulmonary Artery: The estimated pulmonary artery systolic pressure is 18 mmHg.    IVC/SVC: Normal venous pressure at 3 mmHg.    Helena Souza NP  [facility]  Department of Vascular Neurology   [facility] - Neuro Critical Care

## 2023-12-12 NOTE — PROGRESS NOTES
[facility] - Neuro Critical Care  Neurocritical Care  Progress Note    Admit Date: 12/8/2023  Service Date: 12/12/2023  Length of Stay: 4    Subjective:     Chief Complaint: Nontraumatic subarachnoid hemorrhage    History of Present Illness: 57 y/o F w/ PMH migraines, anxiety, and HTN who presented to [facility] with SAH. Patient states that she was working out this morning when she developed a sudden, severe headache, described as among the worst of her life, which prompted her to come to the [facility] ED. According to patient, the headache's improved considerably after she took Excedrin and reduced her activity; if she sits with her eyes closed the pain is 2/10 in intensity and increases to 9/10 with movement. Upon presentation to [facility], patient's BP was measured into the 200s systolics. The patient states that she had a BP into 180s at her last visit with her general practitioner but was not prescribed medication because her pressures were in normal at home. 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 The patient will be admitted to [facility] for further neuro-monitoring and higher level of care.    Hospital Course: 12/9/23: Follow up CTH shows stable SAH. Likely to go for cerebral angiogram later this afternoon.  12/10/23: 1 liter bolus  12/11/23: Post SAH day 3. NAEO. TCDs stable. To go for cerebral angiogram today. Headache pain controlled w/ current pain regimen.  12/12/23: Normal cerebral angiogram. Will go for CTA Friday. If normal, can discharge home. Discontinued Keppra and Nimodipine.     Interval History:  Post SAH day 4. NAEO. Normal cerebral angiogram. Will go for CTA Friday. If normal, can discharge home. Discontinued Keppra and Nimodipine.     Review of Systems   Constitutional:  Negative for fatigue and fever.   HENT: Negative.     Eyes:  Positive for photophobia.    Respiratory: Negative.     Cardiovascular:  Negative for chest pain and palpitations.   Gastrointestinal: Negative.    Endocrine: Negative.    Genitourinary: Negative.    Musculoskeletal: Negative.  Negative for neck stiffness.   Skin: Negative.    Neurological:  Positive for headaches. Negative for dizziness, seizures, syncope, facial asymmetry, speech difficulty, weakness, light-headedness and numbness.   Psychiatric/Behavioral: Negative.         Objective:     Vitals:  Temp: 98.3 °F (36.8 °C)  Pulse: 75  Rhythm: normal sinus rhythm  BP: 133/65  MAP (mmHg): 92  Resp: 18  SpO2: 98 %    Temp  Min: 97.5 °F (36.4 °C)  Max: 98.3 °F (36.8 °C)  Pulse  Min: 66  Max: 82  BP  Min: 104/55  Max: 165/86  MAP (mmHg)  Min: 76  Max: 116  Resp  Min: 9  Max: 21  ETCO2 (mmHg)  Min: 34 mmHg  Max: 43 mmHg  SpO2  Min: 95 %  Max: 100 %    12/11 0701 - 12/12 0700  In: 2689.2 [P.O.:2375; I.V.:314.2]  Out: 2800 [Urine:2800]   Unmeasured Output  Stool Occurrence: 1        Physical Exam  Constitutional:       Appearance: She is not toxic-appearing.   HENT:      Head: Normocephalic.      Nose: Nose normal.      Mouth/Throat:      Mouth: Mucous membranes are moist.   Eyes:      Pupils: Pupils are equal, round, and reactive to light.   Cardiovascular:      Rate and Rhythm: Normal rate and regular rhythm.      Heart sounds: No murmur heard.  Pulmonary:      Effort: Pulmonary effort is normal.      Breath sounds: No stridor. No wheezing.   Abdominal:      General: Abdomen is flat. There is no distension.      Palpations: Abdomen is soft.      Tenderness: There is no abdominal tenderness.   Musculoskeletal:         General: Normal range of motion.      Cervical back: Normal range of motion.      Right lower leg: No edema.      Left lower leg: No edema.   Skin:     General: Skin is warm.   Neurological:      Mental Status: She is alert.      Comments: GCS 15  PEERL  5/5 BL UE and LE  EOMI  Sensation in tact  Cranial nerves in tact   Psychiatric:          Mood and Affect: Mood normal.         Behavior: Behavior normal.         Thought Content: Thought content normal.         Judgment: Judgment normal.              Medications:  Continuous ScheduledamLODIPine, 5 mg, Daily  gabapentin, 300 mg, TID  levETIRAcetam, 500 mg, BID  methocarbamoL, 500 mg, QID  niMODipine, 60 mg, Q4H    PRNacetaminophen, 650 mg, Q4H PRN  labetalol, 10 mg, Q15 Min PRN  magnesium oxide, 800 mg, PRN  magnesium oxide, 800 mg, PRN  melatonin, 6 mg, Nightly PRN  ondansetron, 8 mg, Q8H PRN  oxyCODONE, 5 mg, Q4H PRN  potassium bicarbonate, 35 mEq, PRN  potassium bicarbonate, 50 mEq, PRN  potassium bicarbonate, 60 mEq, PRN  potassium, sodium phosphates, 2 packet, PRN  potassium, sodium phosphates, 2 packet, PRN  potassium, sodium phosphates, 2 packet, PRN      Today I personally reviewed pertinent medications, lines/drains/airways, imaging, cardiology results, laboratory results, microbiology results, notably:    Diet  Diet Adult Regular (IDDSI Level 7) Standard Tray  Diet Adult Regular (IDDSI Level 7) Standard Tray        Assessment/Plan:     Neuro  * Nontraumatic subarachnoid hemorrhage  59 y/o F w/ PMH migraines, anxiety, and HTN who presented to [facility] with SAH. Patient states that she was working out this morning when she developed a sudden, severe headache, described as among the worst of her life, which prompted her to come to the [facility] ED. According to patient, the headache's improved considerably after she took Excedrin and reduced her activity; if she sits with her eyes closed the pain is 2/10 in intensity and increases to 9/10 with movement. Upon presentation to [facility], patient's BP was measured into the 200s systolics. The patient states that she had a BP into 180s at her last visit with her general practitioner but was not prescribed medication because her pressures were in normal at home. 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 The patient will be admitted to [facility] for further neuro-monitoring and higher level of care.    Plan:  -Admit to [facility]  -NSGY and [facility] Neuro following  -SBP <140  -Daily TCDs  -Follow up CT 4PM today  -Daily CBC, CMP, Mg, Phos, Lipids  -Nimodipine 60 mg q4hrs   -Keppra 500 mg BID 7 days  -Q1 neurochecks   -Pain control  -Consider echocardiogram  -Possible cerebral angiogram w/ IR per NSGY    12/9: Will go for cerebral angiogram w/ neuro IR today to assess for presence of aneurysm or AVM  12/10: angio today    12/11: To go for cerebral angiogram today. TCDs stable, showing no vasospasm.     12/12: Cerebral angiogram normal. To go for CTA Friday; can discharge home if normal. Discontinued Keppra and Nimodipine. Will continue daily TCDs.    Cerebral brain hemorrhage        Aggressive risk factor modification: HTN     Rehab efforts: The patient has been evaluated by a stroke team provider and the therapy needs have been fully considered based off the presenting complaints and exam findings. The following therapy evaluations are needed: None    Diagnostics ordered/pending: Other: Cerebral angiogram, Echocardiogram    VTE prophylaxis: None: Reason for No Pharmacological VTE Prophylaxis: SAH    BP parameters: SAH: <140        Cardiac/Vascular  Elevated cholesterol  Hx of elevated lipoproteins   Will get new lipid panel     Essential hypertension  Previously on Losartan-HCTZ for HTN. Has not been on medication for extended period of time due to reported normal home pressures.  Will keep SBP <140 post SAH. Currently on Cardene drip. Will add Labetolol and Hydralazine PRNs.     12/9: BP controlled of cardene drip. Amlodipine 5mg added.              The patient is being Prophylaxed for:  Venous Thromboembolism with: Chemical  Stress Ulcer with: Not Applicable   Ventilator Pneumonia with: not applicable    Activity Orders             Progressive Mobility Protocol (mobilize patient to their highest level of functioning at least twice daily) starting at 12/11 2000    Diet Adult Regular (IDDSI Level 7) Standard Tray: Regular starting at 12/11 1613    Progressive Mobility Protocol (mobilize patient to their highest level of functioning at least twice daily) starting at 12/08 2000    Turn patient starting at 12/08 1400    Elevate HOB starting at 12/08 1324          Full Code    Car Valentine MD  Neurocritical Care  [facility] - Neuro Critical Care

## 2023-12-12 NOTE — PLAN OF CARE
SLC eval completed. No further ST warranted. Continue regular diet/thin liquids at this time.   12/12/2023

## 2023-12-12 NOTE — NURSING
Pt arrived back to room following IR       TIME OF HEMOSTASIS: 1502    Procedure site location: R groin    Mechanical compression device: none - covered with transparent gauze         Pt was escorted by RN on cardiac monitoring, O2, and ambu bag.        Patient placed back on bedside monitor with alarms audible, bed in low position with bed alarm on, call light within reach. WCTM.

## 2023-12-12 NOTE — PT/OT/SLP EVAL
Speech Language Pathology Evaluation  Cognitive Communication  Discharge    Patient Name:  Laura Newell   MRN:  [id number]  Admitting Diagnosis: Nontraumatic subarachnoid hemorrhage    Recommendations:     Recommendations:                General Recommendations:  Follow-up not indicated  Diet recommendations:  Regular Diet - IDDSI Level 7, Thin   Aspiration Precautions: Standard aspiration precautions   General Precautions: Standard, fall  Communication strategies:  none    Assessment:     Laura Newell is a 58 y.o. female with cognitive linguistic aspects deemed WFL. No further ST warranted at this time.     History:     Past Medical History:   Diagnosis Date    Asthma     Lung collapse     Pneumothorax     spontaneous       Past Surgical History:   Procedure Laterality Date    APPENDECTOMY      bilateral groin hernia repair      CEREBRAL ANGIOGRAM N/A 12/10/2023    Procedure: ANGIOGRAM-CEREBRAL; Need Anesthesia; Dr. Byron Iniguez;  Surgeon: Dina Mcnulty;  Location: [facility];  Service: Anesthesiology;  Laterality: N/A;  Need Anesthesia       Social History: Patient lives with spouse and has 2 children. 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 She enjoys exercising.     Prior diet: Regular/thin .        Subjective     Awake/alert    Pain/Comfort:  Pain Rating 1: 0/10  Pain Rating Post-Intervention 1: 0/10    Respiratory Status: Room air    Objective:     Cognitive Status:    Orientation Oriented x4  Memory WFL  Problem Solving Categories 100%, Sequencing 4/4 word set, and Compare/contrast 100%, reasoning 100%       Receptive Language:   Comprehension:   Questions Complex yes/no 100%  Commands  complex/abstract commands 100%    Pragmatics:    WFL    Expressive Language:  Verbal:    Verbal language skills were wfl with no evidence of aphasia.  Pt. Expressed their thoughts coherently in conversation with no evidence of confusion or word finding deficits  Nonverbal:   WFL      Motor Speech:  WFL    Voice:    WFL    Visual-Spatial:  Pt with light sensitivity and difficulty focusing eyes 2/2 headache.     Treatment: She tolerated gurwinder cracker without difficulty. Recommend continue regular diet/thin liquids at this time. She has denied any difficulty with PO intake. Oropharyngeal swallow and cognitive linguistic aspects deemed WFL. No further ST warranted at this time.    Goals:   Multidisciplinary Problems       SLP Goals          Problem: SLP    Goal Priority Disciplines Outcome   SLP Goal     SLP Ongoing, Progressing   Description: Speech Language Pathology Goals  Goals expected to be met by 12/16:  1. Pt will demonstrate tolerance of regular solids with thin liquids without overt s/s airway threat  2. Pt will participate in assessment of speech/language/cognitive skills to determine future therapeutic plan of care.                          Plan:   Patient to be seen:  4 x/week   Plan of Care expires:  01/07/24  Plan of Care reviewed with:  patient   SLP Follow-Up:  No       Discharge recommendations:  Therapy Intensity Recommendations at Discharge:  (pending completion of cognitive-linguistic evaluation)       Time Tracking:     SLP Treatment Date:   12/12/23  Speech Start Time:  1122  Speech Stop Time:  1131     Speech Total Time (min):  9 min    Billable Minutes: Eval 9     12/12/2023

## 2023-12-12 NOTE — PLAN OF CARE
"[facility] Care Plan    POC reviewed with Laura Newell and family at 0300. Pt verbalized understanding. Questions and concerns addressed. No acute events overnight. Pt progressing toward goals. Will continue to monitor. See below and flowsheets for full assessment and VS info.     -PRN oxy utilized for HA   -PRN zofran utilized for nausea   -groin site with no signs of hematoma or bleeding   -no acute events over night, progressing well         Is this a stroke patient? yes- Stroke booklet reviewed with patient, risk factors identified for patient and stroke booklet remains at bedside for ongoing education.     Neuro:  [place] Coma Scale  Best Eye Response: 4-->(E4) spontaneous  Best Motor Response: 6-->(M6) obeys commands  Best Verbal Response: 5-->(V5) oriented  [place] Coma Scale Score: 15  Assessment Qualifiers: patient not sedated/intubated  Pupil PERRLA: yes     24hr Temp:  [97.5 °F (36.4 °C)-98 °F (36.7 °C)]     CV:   Rhythm: normal sinus rhythm  BP goals:   SBP < 140  MAP > 65    Resp:           Plan: N/A    GI/:     Diet/Nutrition Received: regular  Last Bowel Movement: 12/11/23  Voiding Characteristics: voids spontaneously without difficulty    Intake/Output Summary (Last 24 hours) at 12/12/2023 0521  Last data filed at 12/12/2023 0202  Gross per 24 hour   Intake 3333.78 ml   Output 2700 ml   Net 633.78 ml     Unmeasured Output  Stool Occurrence: 1    Labs/Accuchecks:  Recent Labs   Lab 12/12/23  0235   WBC 5.93   RBC 4.15   HGB 12.7   HCT 37.0         Recent Labs   Lab 12/12/23  0235      K 3.9   CO2 21*      BUN 12   CREATININE 1.0   ALKPHOS 80   ALT 16   AST 20   BILITOT 0.6      Recent Labs   Lab 12/08/23  1214   INR 1.0   APTT 27.0    No results for input(s): "CPK", "CPKMB", "TROPONINI", "MB" in the last 168 hours.    Electrolytes: WDL  Accuchecks: none    Gtts:   sodium chloride 0.9% Stopped (12/11/23 1900)       LDA/Wounds:  Lines/Drains/Airways       Peripheral Intravenous Line  " Duration                  Peripheral IV - Single Lumen 12/08/23 2037 18 G Right Antecubital 3 days         Peripheral IV - Single Lumen 12/12/23 0239 20 G Left Antecubital <1 day                  Wounds: No  Wound care consulted: No

## 2023-12-13 LAB
ALBUMIN SERPL BCP-MCNC: 3.2 G/DL (ref 3.5–5.2)
ALP SERPL-CCNC: 78 U/L (ref 55–135)
ALT SERPL W/O P-5'-P-CCNC: 22 U/L (ref 10–44)
ANION GAP SERPL CALC-SCNC: 6 MMOL/L (ref 8–16)
AST SERPL-CCNC: 18 U/L (ref 10–40)
BASOPHILS # BLD AUTO: 0.07 K/UL (ref 0–0.2)
BASOPHILS NFR BLD: 1.6 % (ref 0–1.9)
BILIRUB SERPL-MCNC: 0.4 MG/DL (ref 0.1–1)
BUN SERPL-MCNC: 15 MG/DL (ref 6–20)
CALCIUM SERPL-MCNC: 8.9 MG/DL (ref 8.7–10.5)
CHLORIDE SERPL-SCNC: 108 MMOL/L (ref 95–110)
CO2 SERPL-SCNC: 23 MMOL/L (ref 23–29)
CREAT SERPL-MCNC: 1 MG/DL (ref 0.5–1.4)
DIFFERENTIAL METHOD: ABNORMAL
EOSINOPHIL # BLD AUTO: 0.3 K/UL (ref 0–0.5)
EOSINOPHIL NFR BLD: 7 % (ref 0–8)
ERYTHROCYTE [DISTWIDTH] IN BLOOD BY AUTOMATED COUNT: 12.4 % (ref 11.5–14.5)
EST. GFR  (NO RACE VARIABLE): >60 ML/MIN/1.73 M^2
GLUCOSE SERPL-MCNC: 99 MG/DL (ref 70–110)
HCT VFR BLD AUTO: 36 % (ref 37–48.5)
HGB BLD-MCNC: 12.3 G/DL (ref 12–16)
IMM GRANULOCYTES # BLD AUTO: 0 K/UL (ref 0–0.04)
IMM GRANULOCYTES NFR BLD AUTO: 0 % (ref 0–0.5)
LYMPHOCYTES # BLD AUTO: 1.5 K/UL (ref 1–4.8)
LYMPHOCYTES NFR BLD: 36 % (ref 18–48)
MAGNESIUM SERPL-MCNC: 1.9 MG/DL (ref 1.6–2.6)
MCH RBC QN AUTO: 30.4 PG (ref 27–31)
MCHC RBC AUTO-ENTMCNC: 34.2 G/DL (ref 32–36)
MCV RBC AUTO: 89 FL (ref 82–98)
MONOCYTES # BLD AUTO: 0.4 K/UL (ref 0.3–1)
MONOCYTES NFR BLD: 10 % (ref 4–15)
NEUTROPHILS # BLD AUTO: 1.9 K/UL (ref 1.8–7.7)
NEUTROPHILS NFR BLD: 45.4 % (ref 38–73)
NRBC BLD-RTO: 0 /100 WBC
PHOSPHATE SERPL-MCNC: 3.9 MG/DL (ref 2.7–4.5)
PLATELET # BLD AUTO: 223 K/UL (ref 150–450)
PMV BLD AUTO: 9.2 FL (ref 9.2–12.9)
POTASSIUM SERPL-SCNC: 4 MMOL/L (ref 3.5–5.1)
PROT SERPL-MCNC: 6.2 G/DL (ref 6–8.4)
RBC # BLD AUTO: 4.04 M/UL (ref 4–5.4)
SODIUM SERPL-SCNC: 137 MMOL/L (ref 136–145)
WBC # BLD AUTO: 4.28 K/UL (ref 3.9–12.7)

## 2023-12-13 PROCEDURE — 85025 COMPLETE CBC W/AUTO DIFF WBC: CPT

## 2023-12-13 PROCEDURE — 84100 ASSAY OF PHOSPHORUS: CPT

## 2023-12-13 PROCEDURE — 99233 PR SUBSEQUENT HOSPITAL CARE,LEVL III: ICD-10-PCS | Mod: ,,, | Performed by: STUDENT IN AN ORGANIZED HEALTH CARE EDUCATION/TRAINING PROGRAM

## 2023-12-13 PROCEDURE — 11000001 HC ACUTE MED/SURG PRIVATE ROOM

## 2023-12-13 PROCEDURE — 94761 N-INVAS EAR/PLS OXIMETRY MLT: CPT

## 2023-12-13 PROCEDURE — 99233 SBSQ HOSP IP/OBS HIGH 50: CPT | Mod: ,,, | Performed by: STUDENT IN AN ORGANIZED HEALTH CARE EDUCATION/TRAINING PROGRAM

## 2023-12-13 PROCEDURE — 25000003 PHARM REV CODE 250

## 2023-12-13 PROCEDURE — 80053 COMPREHEN METABOLIC PANEL: CPT

## 2023-12-13 PROCEDURE — 99233 SBSQ HOSP IP/OBS HIGH 50: CPT | Mod: ,,, | Performed by: PSYCHIATRY & NEUROLOGY

## 2023-12-13 PROCEDURE — 99233 PR SUBSEQUENT HOSPITAL CARE,LEVL III: ICD-10-PCS | Mod: ,,, | Performed by: PSYCHIATRY & NEUROLOGY

## 2023-12-13 PROCEDURE — 83735 ASSAY OF MAGNESIUM: CPT

## 2023-12-13 PROCEDURE — 63600175 PHARM REV CODE 636 W HCPCS: Performed by: NURSE PRACTITIONER

## 2023-12-13 RX ADMIN — OXYCODONE HYDROCHLORIDE 5 MG: 5 TABLET ORAL at 06:12

## 2023-12-13 RX ADMIN — METHOCARBAMOL 500 MG: 500 TABLET ORAL at 08:12

## 2023-12-13 RX ADMIN — GABAPENTIN 300 MG: 300 CAPSULE ORAL at 08:12

## 2023-12-13 RX ADMIN — METHOCARBAMOL 500 MG: 500 TABLET ORAL at 04:12

## 2023-12-13 RX ADMIN — AMLODIPINE BESYLATE 5 MG: 5 TABLET ORAL at 08:12

## 2023-12-13 RX ADMIN — ACETAMINOPHEN 650 MG: 325 TABLET ORAL at 09:12

## 2023-12-13 RX ADMIN — METHOCARBAMOL 500 MG: 500 TABLET ORAL at 12:12

## 2023-12-13 RX ADMIN — GABAPENTIN 300 MG: 300 CAPSULE ORAL at 02:12

## 2023-12-13 RX ADMIN — ACETAMINOPHEN 650 MG: 325 TABLET ORAL at 01:12

## 2023-12-13 RX ADMIN — ENOXAPARIN SODIUM 40 MG: 40 INJECTION SUBCUTANEOUS at 04:12

## 2023-12-13 NOTE — SUBJECTIVE & OBJECTIVE
Interval History:  Awaiting stepdown to vascular neurology floor. Improving photophobia and headache. CTA Friday, if normal, discharge home.      Review of Systems   Constitutional:  Negative for fatigue and fever.   HENT: Negative.     Eyes:  Positive for photophobia.   Respiratory: Negative.     Cardiovascular:  Negative for chest pain and palpitations.   Gastrointestinal: Negative.    Endocrine: Negative.    Genitourinary: Negative.    Musculoskeletal: Negative.  Negative for neck stiffness.   Skin: Negative.    Neurological:  Positive for headaches. Negative for dizziness, seizures, syncope, facial asymmetry, speech difficulty, weakness, light-headedness and numbness.   Psychiatric/Behavioral: Negative.         Objective:     Vitals:  Temp: 98.3 °F (36.8 °C)  Pulse: 75  Rhythm: normal sinus rhythm  BP: 133/70  MAP (mmHg): 95  Resp: 11  SpO2: 97 %    Temp  Min: 98 °F (36.7 °C)  Max: 98.3 °F (36.8 °C)  Pulse  Min: 62  Max: 82  BP  Min: 101/53  Max: 146/75  MAP (mmHg)  Min: 73  Max: 107  Resp  Min: 10  Max: 24  SpO2  Min: 95 %  Max: 99 %    12/12 0701 - 12/13 0700  In: 1750 [P.O.:1750]  Out: 2250 [Urine:2250]   Unmeasured Output  Stool Occurrence: 1        Physical Exam  Constitutional:       Appearance: She is not toxic-appearing.   HENT:      Head: Normocephalic.      Nose: Nose normal.      Mouth/Throat:      Mouth: Mucous membranes are moist.   Eyes:      Pupils: Pupils are equal, round, and reactive to light.   Cardiovascular:      Rate and Rhythm: Normal rate and regular rhythm.      Heart sounds: No murmur heard.  Pulmonary:      Effort: Pulmonary effort is normal.      Breath sounds: No stridor. No wheezing.   Abdominal:      General: Abdomen is flat. There is no distension.      Palpations: Abdomen is soft.      Tenderness: There is no abdominal tenderness.   Musculoskeletal:         General: Normal range of motion.      Cervical back: Normal range of motion.      Right lower leg: No edema.      Left lower  leg: No edema.   Skin:     General: Skin is warm.   Neurological:      Mental Status: She is alert.      Comments: GCS 15  PEERL  5/5 BL UE and LE  EOMI  Sensation in tact  Cranial nerves in tact   Psychiatric:         Mood and Affect: Mood normal.         Behavior: Behavior normal.         Thought Content: Thought content normal.         Judgment: Judgment normal.              Medications:  Continuous ScheduledamLODIPine, 5 mg, Daily  enoxparin, 40 mg, Q24H (prophylaxis, 1700)  gabapentin, 300 mg, TID  methocarbamoL, 500 mg, QID    PRNacetaminophen, 650 mg, Q4H PRN  labetalol, 10 mg, Q15 Min PRN  magnesium oxide, 800 mg, PRN  magnesium oxide, 800 mg, PRN  melatonin, 6 mg, Nightly PRN  ondansetron, 8 mg, Q8H PRN  oxyCODONE, 5 mg, Q4H PRN  potassium bicarbonate, 35 mEq, PRN  potassium bicarbonate, 50 mEq, PRN  potassium bicarbonate, 60 mEq, PRN  potassium, sodium phosphates, 2 packet, PRN  potassium, sodium phosphates, 2 packet, PRN  potassium, sodium phosphates, 2 packet, PRN      Today I personally reviewed pertinent medications, lines/drains/airways, imaging, cardiology results, laboratory results, microbiology results,     Diet  Diet Adult Regular (IDDSI Level 7) Standard Tray  Diet Adult Regular (IDDSI Level 7) Standard Tray

## 2023-12-13 NOTE — PLAN OF CARE
"[facility] Care Plan    POC reviewed with Laura Newell and family at 1400. Pt verbalized understanding. Questions and concerns addressed. No acute events today. Pt progressing toward goals. Will continue to monitor. See below and flowsheets for full assessment and VS info.     PRN Zofran utilized for nausea  PRN Oxy x1 for pain control  PRN Tylenol x1 for h/a  Educational materials on SAH provided  Transfer orders in         Is this a stroke patient? yes- Stroke booklet reviewed with patient and family, risk factors identified for patient and stroke booklet remains at bedside for ongoing education.     Neuro:  [place] Coma Scale  Best Eye Response: 4-->(E4) spontaneous  Best Motor Response: 6-->(M6) obeys commands  Best Verbal Response: 5-->(V5) oriented  Balbir Coma Scale Score: 15  Assessment Qualifiers: patient not sedated/intubated  Pupil PERRLA: yes     24 hr Temp:  [97.5 °F (36.4 °C)-98.3 °F (36.8 °C)]     CV:   Rhythm: normal sinus rhythm  BP goals:   SBP < 140  MAP > 65    Resp:           Plan: N/A    GI/:     Diet/Nutrition Received: regular  Last Bowel Movement: 12/12/23  Voiding Characteristics: voids spontaneously without difficulty    Intake/Output Summary (Last 24 hours) at 12/12/2023 1858  Last data filed at 12/12/2023 1702  Gross per 24 hour   Intake 2939.17 ml   Output 3250 ml   Net -310.83 ml     Unmeasured Output  Stool Occurrence: 1    Labs/Accuchecks:  Recent Labs   Lab 12/12/23  0235   WBC 5.93   RBC 4.15   HGB 12.7   HCT 37.0         Recent Labs   Lab 12/12/23  0235      K 3.9   CO2 21*      BUN 12   CREATININE 1.0   ALKPHOS 80   ALT 16   AST 20   BILITOT 0.6      Recent Labs   Lab 12/08/23  1214   INR 1.0   APTT 27.0    No results for input(s): "CPK", "CPKMB", "TROPONINI", "MB" in the last 168 hours.    Electrolytes: N/A - electrolytes WDL  Accuchecks: none    Gtts:      LDA/Wounds:  Lines/Drains/Airways       Peripheral Intravenous Line  Duration                  Peripheral " IV - Single Lumen 12/08/23 2037 18 G Right Antecubital 3 days         Peripheral IV - Single Lumen 12/12/23 0239 20 G Left Antecubital <1 day                  Wounds: No  Wound care consulted: No     Problem: Adult Inpatient Plan of Care  Goal: Plan of Care Review  Outcome: Ongoing, Progressing  Goal: Patient-Specific Goal (Individualized)  Outcome: Ongoing, Progressing  Goal: Optimal Comfort and Wellbeing  Outcome: Ongoing, Progressing     Problem: Adjustment to Illness (Stroke, Hemorrhagic)  Goal: Optimal Coping  Outcome: Ongoing, Progressing     Problem: Cerebral Tissue Perfusion (Stroke, Hemorrhagic)  Goal: Optimal Cerebral Tissue Perfusion  Outcome: Ongoing, Progressing     Problem: Sensorimotor Impairment (Stroke, Hemorrhagic)  Goal: Improved Sensorimotor Function  Outcome: Ongoing, Progressing

## 2023-12-13 NOTE — PROGRESS NOTES
[facility] - Neuro Critical Care  Neurosurgery  Progress Note    Subjective:     History of Present Illness: 57 yo female with history of HTN transferred from [facility] for NSGY evaluation of subarachnoid hemorrhage. She presented to the emergency room with complaint of acute onset headache while working out this am. States that she took Excedrin migraine which helped a little bit.  States that she has never had a headache like this before.  Patient's systolic blood pressures greater than 200 upon arrival.  CTH at OSH shows small hemorrhage along the anterior and right lateral aspects of the katiuska, no IVH. Pt is not on blood thinners.      Post-Op Info:  Procedure(s) (LRB):  ANGIOGRAM-CEREBRAL; Need Anesthesia; Dr. Byron Iniguez (N/A)   3 Days Post-Op   Interval History:   12/13/2023 naeon exam stable, continue to monitor until PBD7    Medications:  Continuous Infusions:  Scheduled Meds:   amLODIPine  5 mg Oral Daily    enoxparin  40 mg Subcutaneous Q24H (prophylaxis, 1700)    gabapentin  300 mg Oral TID    methocarbamoL  500 mg Oral QID     PRN Meds:acetaminophen, labetalol, magnesium oxide, magnesium oxide, melatonin, ondansetron, oxyCODONE, potassium bicarbonate, potassium bicarbonate, potassium bicarbonate, potassium, sodium phosphates, potassium, sodium phosphates, potassium, sodium phosphates     Review of Systems  Objective:     Weight: 64.9 kg (143 lb)  Body mass index is 24.55 kg/m².  Vital Signs (Most Recent):  Temp: 98 °F (36.7 °C) (12/13/23 0302)  Pulse: 70 (12/13/23 0602)  Resp: 15 (12/13/23 0606)  BP: (!) 146/74 (12/13/23 0602)  SpO2: 96 % (12/13/23 0602) Vital Signs (24h Range):  Temp:  [98 °F (36.7 °C)-98.2 °F (36.8 °C)] 98 °F (36.7 °C)  Pulse:  [62-82] 70  Resp:  [10-24] 15  SpO2:  [95 %-99 %] 96 %  BP: (101-146)/(53-95) 146/74                                Physical Exam     Aox3. E4V5M6  PERRL, EOMI  CN II-XII intact grossly.  Face Symmetric, tongue midline, symmetric shoulder shrug.  BUE  "5/5, BLE 5/5  Sensation intact throughout.  No drift.    Neurosurgery Physical Exam    Significant Labs:  Recent Labs   Lab 12/12/23  0235 12/13/23  0308   GLU 91 99    137   K 3.9 4.0    108   CO2 21* 23   BUN 12 15   CREATININE 1.0 1.0   CALCIUM 9.1 8.9   MG 1.9 1.9       Recent Labs   Lab 12/12/23  0235 12/13/23  0308   WBC 5.93 4.28   HGB 12.7 12.3   HCT 37.0 36.0*    223       No results for input(s): "LABPT", "INR", "APTT" in the last 48 hours.  Microbiology Results (last 7 days)       ** No results found for the last 168 hours. **          All pertinent labs from the last 24 hours have been reviewed.    Significant Diagnostics:  I have reviewed and interpreted all pertinent imaging results/findings within the past 24 hours.  Assessment/Plan:     * Nontraumatic subarachnoid hemorrhage  59 yo female with history of HTN who presents with nontraumatic prepontine subarachnoid hemorrhage, HH2F2    -Admit to NCC  -Q1h neuro checks  -Imaging and Diagnostics reviewed  -CTA shows stable subarachnoid hemorrhage.  No hydrocephalus. No intracranial aneurysm or AVM is identified.  -DSA normal  -SBP<140  -HOB @30  -Keppra 500mg BID x 7 days  -TCDs daily  x 14d  -Nimotop 60q4h x 21d  -Eunatremic  -Continue to follow clinically and notify NSGY with any decline in neuro status.     Dispo: ongoing    Discussed with Dr. Hira Miles MD  Neurosurgery  [facility] - Neuro Critical Care  "

## 2023-12-13 NOTE — PROGRESS NOTES
[facility] - Neuro Critical Care  Vascular Neurology  Comprehensive Stroke Center  Progress Note    Assessment/Plan:     * Nontraumatic subarachnoid hemorrhage  Laura Newell is a 58 y.o. female with a significant medical history of asthma who presents to the hospital as a transfer from [facility] for evaluation of SAH.  She was hypertensive on presentation to the OSH ED, 200s/100s. CT with perimesencephalic SAH. CTA negative for aneurysm or other cerebrovascular abnormalities. TTE EF 65%, no LAE, normal wall thickness noted. DSA unremarkable for vascular abnormalities.    NAEON. Neuro exam stable. Patient continues to have persistent photophobia and headaches. TCDs stable. Patient pending NPU stepdown.      Antithrombotics for secondary stroke prevention: Antiplatelets: None: Intracerebral Hemorrhage    Statins for secondary stroke prevention and hyperlipidemia, if present:   Statins: None: Reason: SAH    Aggressive risk factor modification: None     Rehab efforts: The patient has been evaluated by a stroke team provider and the therapy needs have been fully considered based off the presenting complaints and exam findings. The following therapy evaluations are needed: PT evaluate and treat, OT evaluate and treat    Diagnostics ordered/pending: Other: Daily TCDs    VTE prophylaxis: Mechanical prophylaxis: Place SCDs  None: Reason for No Pharmacological VTE Prophylaxis: SAH    BP parameters: SAH: SBP<140        Elevated cholesterol  -Stroke risk factor.  Noted in the patient's record as of 6/2020.  Patient not currently prescribed statin.  -Lipid panel WNL  -statin not indicated    Essential hypertension  -Stroke risk factor.  Noted in the patient's record as of 8/2019.  Patient not currently prescribed medications.  -Initial SBP 200s.  -SBP <140  -Initially on cardene, currently off at this time  -on amlodipine 5 mg daily  -PRN labetalol  -Monitor for need to d/c on home BP meds         12/9/23  patient reports improvement in HA, rates pain 2/10, persistent photophobia, exam nonfocal, CTA negative for aneurysm or other cerebrovascular abnormality, DSA pending, TCD pending  12/12/23: ANALIA. Neuro exam stable. Patient continues to have intermittent headaches, neck pain, and nausea. Patient reports relief with zofran. Tylenol was given for headache and neck pain. Daily TCDs in progress, no evidence of vasospasms noted for today's TCD. Patient stable for NPU stepdown.  12/13/23: ANALIA. Neuro exam stable. Patient continues to have persistent photophobia and headaches. TCDs stable. Patient pending NPU stepdown.    STROKE DOCUMENTATION        NIH Scale:  1a. Level of Consciousness: 0-->Alert, keenly responsive  1b. LOC Questions: 0-->Answers both questions correctly  1c. LOC Commands: 0-->Performs both tasks correctly  2. Best Gaze: 0-->Normal  3. Visual: 0-->No visual loss  4. Facial Palsy: 0-->Normal symmetrical movements  5a. Motor Arm, Left: 0-->No drift, limb holds 90 (or 45) degrees for full 10 secs  5b. Motor Arm, Right: 0-->No drift, limb holds 90 (or 45) degrees for full 10 secs  6a. Motor Leg, Left: 0-->No drift, leg holds 30 degree position for full 5 secs  6b. Motor Leg, Right: 0-->No drift, leg holds 30 degree position for full 5 secs  7. Limb Ataxia: 0-->Absent  8. Sensory: 0-->Normal, no sensory loss  9. Best Language: 0-->No aphasia, normal  10. Dysarthria: 0-->Normal  11. Extinction and Inattention (formerly Neglect): 0-->No abnormality  Total (NIH Stroke Scale): 0       Modified Hayden Score: 0  Balbir Coma Scale:15   ABCD2 Score:    [id number]-DOD Score:   HAS -BLED Score:   ICH Score:0  Hunt & Bravo Classification:Grade I     Hemorrhagic change of an Ischemic Stroke: Does this patient have an ischemic stroke with hemorrhagic changes? No     Neurologic Chief Complaint: HA and photophobia    Subjective:     Interval History: Patient is seen for follow-up neurological assessment and treatment  recommendations: NAEON. Neuro exam stable. Patient continues to have persistent photophobia and headaches. TCDs stable. Patient pending NPU stepdown.    HPI, Past Medical, Family, and Social History remains the same as documented in the initial encounter.     Review of Systems   Eyes:  Positive for photophobia.   Neurological:  Positive for headaches. Negative for facial asymmetry, speech difficulty, weakness and numbness.     Scheduled Meds:   amLODIPine  5 mg Oral Daily    enoxparin  40 mg Subcutaneous Q24H (prophylaxis, 1700)    gabapentin  300 mg Oral TID    methocarbamoL  500 mg Oral QID     Continuous Infusions:      PRN Meds:acetaminophen, labetalol, magnesium oxide, magnesium oxide, melatonin, ondansetron, oxyCODONE, potassium bicarbonate, potassium bicarbonate, potassium bicarbonate, potassium, sodium phosphates, potassium, sodium phosphates, potassium, sodium phosphates    Objective:     Vital Signs (Most Recent):  Temp: 98 °F (36.7 °C) (12/13/23 1102)  Pulse: 82 (12/13/23 1402)  Resp: 18 (12/13/23 1402)  BP: 133/76 (12/13/23 1402)  SpO2: 98 % (12/13/23 1602)  BP Location: Left arm    Vital Signs Range (Last 24H):  Temp:  [98 °F (36.7 °C)-98.3 °F (36.8 °C)]   Pulse:  [62-82]   Resp:  [10-18]   BP: (101-146)/(53-95)   SpO2:  [95 %-99 %]   BP Location: Left arm       Physical Exam  Vitals reviewed.   Constitutional:       General: She is not in acute distress.  HENT:      Head: Normocephalic and atraumatic.      Mouth/Throat:      Mouth: Mucous membranes are moist.   Eyes:      Extraocular Movements: Extraocular movements intact.   Cardiovascular:      Rate and Rhythm: Normal rate.   Pulmonary:      Effort: Pulmonary effort is normal. No respiratory distress.   Skin:     General: Skin is warm and dry.   Neurological:      Mental Status: She is alert and oriented to person, place, and time.              Neurological Exam:   LOC: alert  Attention Span: Good   Language: No aphasia  Articulation: No  "dysarthria  Orientation: Person, Place, Time   Visual Fields: Full  EOM (CN III, IV, VI): Full/intact  Facial Movement (CN VII): Symmetric facial expression    Motor: Arm left  Normal 5/5  Leg left  Normal 5/5  Arm right  Normal 5/5  Leg right Normal 5/5  Cerebellum: No evidence of appendicular or axial ataxia  Sensation: Intact to light touch  Tone: Normal tone throughout    Laboratory:  CMP:   Recent Labs   Lab 12/13/23  0308   CALCIUM 8.9   ALBUMIN 3.2*   PROT 6.2      K 4.0   CO2 23      BUN 15   CREATININE 1.0   ALKPHOS 78   ALT 22   AST 18   BILITOT 0.4       CBC:   Recent Labs   Lab 12/13/23  0308   WBC 4.28   RBC 4.04   HGB 12.3   HCT 36.0*      MCV 89   MCH 30.4   MCHC 34.2       Lipid Panel:   Recent Labs   Lab 12/08/23  1403   CHOL 181   LDLCALC 128.4   HDL 46   TRIG 33       Coagulation:   Recent Labs   Lab 12/08/23  1214   INR 1.0   APTT 27.0       Platelet Aggregation Study: No results for input(s): "PLTAGG", "PLTAGINTERP", "PLTAGREGLACO", "ADPPLTAGGREG" in the last 168 hours.  Hgb A1C:   Recent Labs   Lab 12/08/23  1403   HGBA1C 5.4       TSH:   Recent Labs   Lab 12/08/23  1403   TSH 0.291*         Diagnostic Results     Brain Imaging:   [facility] 12/8/2023 @1657  Impression:  Stable appearance of the subarachnoid hemorrhage.  No hydrocephalus.  Nonspecific prominent white matter changes.     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  Patchy hypodense areas involving the bilateral subcortical cerebral white matter, nonspecific and possibly reflecting chronic small vessel ischemic change.        Vessel Imaging:  IR Angiogram Carotid Cerebral Bilateral inc Arch 12/12/23  Impression:  Six vessel cerebral angiogram showed no significant abnormalities to account for patient's subarachnoid hemorrhage.  No aneurysm, arteriovenous malformation or AV fistula.    CTA Head and " Neck 12/8/2023   Impression: Stable subarachnoid hemorrhage.  No hydrocephalus.  Nonspecific presumed chronic white matter changes again identified.  No intracranial aneurysm or AVM is identified.        Cardiac Imaging:  TTE 12/9/23    Left Ventricle: The left ventricle is normal in size. Normal wall thickness. Normal wall motion. There is normal systolic function. Ejection fraction by visual approximation is 65%. There is normal diastolic function.    Right Ventricle: Normal right ventricular cavity size. Wall thickness is normal. Right ventricle wall motion  is normal. Systolic function is normal.    Pulmonary Artery: The estimated pulmonary artery systolic pressure is 18 mmHg.    IVC/SVC: Normal venous pressure at 3 mmHg.    Helena Souza NP  Comprehensive Stroke Center  Department of Vascular Neurology   [facility] - Neuro Critical Care

## 2023-12-13 NOTE — PLAN OF CARE
"[facility] Care Plan    POC reviewed with Laura Newell and family at 0300. Pt verbalized understanding. Questions and concerns addressed. No acute events overnight. Pt progressing toward goals. Will continue to monitor. See below and flowsheets for full assessment and VS info.     -Transfer orders in place  -PRN Oxy x1 for HA           Is this a stroke patient? yes- Stroke booklet reviewed with patient and family, risk factors identified for patient and stroke booklet remains at bedside for ongoing education.     Neuro:  [place] Coma Scale  Best Eye Response: 4-->(E4) spontaneous  Best Motor Response: 6-->(M6) obeys commands  Best Verbal Response: 5-->(V5) oriented  [place] Coma Scale Score: 15  Assessment Qualifiers: patient not sedated/intubated  Pupil PERRLA: yes     24hr Temp:  [98 °F (36.7 °C)-98.3 °F (36.8 °C)]     CV:   Rhythm: normal sinus rhythm  BP goals:   SBP < 140  MAP > 65    Resp:           Plan: N/A    GI/:     Diet/Nutrition Received: regular  Last Bowel Movement: 12/12/23  Voiding Characteristics: voids spontaneously without difficulty    Intake/Output Summary (Last 24 hours) at 12/13/2023 0655  Last data filed at 12/13/2023 0602  Gross per 24 hour   Intake 1750 ml   Output 2250 ml   Net -500 ml     Unmeasured Output  Stool Occurrence: 1    Labs/Accuchecks:  Recent Labs   Lab 12/13/23  0308   WBC 4.28   RBC 4.04   HGB 12.3   HCT 36.0*         Recent Labs   Lab 12/13/23  0308      K 4.0   CO2 23      BUN 15   CREATININE 1.0   ALKPHOS 78   ALT 22   AST 18   BILITOT 0.4      Recent Labs   Lab 12/08/23  1214   INR 1.0   APTT 27.0    No results for input(s): "CPK", "CPKMB", "TROPONINI", "MB" in the last 168 hours.    Electrolytes: N/A - electrolytes WDL  Accuchecks: none    Gtts:      LDA/Wounds:  Lines/Drains/Airways       Peripheral Intravenous Line  Duration                  Peripheral IV - Single Lumen 12/08/23 2037 18 G Right Antecubital 4 days         Peripheral IV - Single Lumen " 12/12/23 0239 20 G Left Antecubital 1 day                  Wounds: No  Wound care consulted: No

## 2023-12-13 NOTE — SUBJECTIVE & OBJECTIVE
"Interval History:   12/13/2023 naeon exam stable, continue to monitor until PBD7    Medications:  Continuous Infusions:  Scheduled Meds:   amLODIPine  5 mg Oral Daily    enoxparin  40 mg Subcutaneous Q24H (prophylaxis, 1700)    gabapentin  300 mg Oral TID    methocarbamoL  500 mg Oral QID     PRN Meds:acetaminophen, labetalol, magnesium oxide, magnesium oxide, melatonin, ondansetron, oxyCODONE, potassium bicarbonate, potassium bicarbonate, potassium bicarbonate, potassium, sodium phosphates, potassium, sodium phosphates, potassium, sodium phosphates     Review of Systems  Objective:     Weight: 64.9 kg (143 lb)  Body mass index is 24.55 kg/m².  Vital Signs (Most Recent):  Temp: 98 °F (36.7 °C) (12/13/23 0302)  Pulse: 70 (12/13/23 0602)  Resp: 15 (12/13/23 0606)  BP: (!) 146/74 (12/13/23 0602)  SpO2: 96 % (12/13/23 0602) Vital Signs (24h Range):  Temp:  [98 °F (36.7 °C)-98.2 °F (36.8 °C)] 98 °F (36.7 °C)  Pulse:  [62-82] 70  Resp:  [10-24] 15  SpO2:  [95 %-99 %] 96 %  BP: (101-146)/(53-95) 146/74                                 Physical Exam     Aox3. E4V5M6  PERRL, EOMI  CN II-XII intact grossly.  Face Symmetric, tongue midline, symmetric shoulder shrug.  BUE 5/5, BLE 5/5  Sensation intact throughout.  No drift.    Neurosurgery Physical Exam    Significant Labs:  Recent Labs   Lab 12/12/23 0235 12/13/23 0308   GLU 91 99    137   K 3.9 4.0    108   CO2 21* 23   BUN 12 15   CREATININE 1.0 1.0   CALCIUM 9.1 8.9   MG 1.9 1.9       Recent Labs   Lab 12/12/23  0235 12/13/23 0308   WBC 5.93 4.28   HGB 12.7 12.3   HCT 37.0 36.0*    223       No results for input(s): "LABPT", "INR", "APTT" in the last 48 hours.  Microbiology Results (last 7 days)       ** No results found for the last 168 hours. **          All pertinent labs from the last 24 hours have been reviewed.    Significant Diagnostics:  I have reviewed and interpreted all pertinent imaging results/findings within the past 24 hours.  "

## 2023-12-13 NOTE — SUBJECTIVE & OBJECTIVE
Neurologic Chief Complaint: HA and photophobia    Subjective:     Interval History: Patient is seen for follow-up neurological assessment and treatment recommendations: ALONEON. Neuro exam stable. Patient continues to have persistent photophobia and headaches. TCDs stable. Patient pending NPU stepdown.    HPI, Past Medical, Family, and Social History remains the same as documented in the initial encounter.     Review of Systems   Eyes:  Positive for photophobia.   Neurological:  Positive for headaches. Negative for facial asymmetry, speech difficulty, weakness and numbness.     Scheduled Meds:   amLODIPine  5 mg Oral Daily    enoxparin  40 mg Subcutaneous Q24H (prophylaxis, 1700)    gabapentin  300 mg Oral TID    methocarbamoL  500 mg Oral QID     Continuous Infusions:      PRN Meds:acetaminophen, labetalol, magnesium oxide, magnesium oxide, melatonin, ondansetron, oxyCODONE, potassium bicarbonate, potassium bicarbonate, potassium bicarbonate, potassium, sodium phosphates, potassium, sodium phosphates, potassium, sodium phosphates    Objective:     Vital Signs (Most Recent):  Temp: 98 °F (36.7 °C) (12/13/23 1102)  Pulse: 82 (12/13/23 1402)  Resp: 18 (12/13/23 1402)  BP: 133/76 (12/13/23 1402)  SpO2: 98 % (12/13/23 1602)  BP Location: Left arm    Vital Signs Range (Last 24H):  Temp:  [98 °F (36.7 °C)-98.3 °F (36.8 °C)]   Pulse:  [62-82]   Resp:  [10-18]   BP: (101-146)/(53-95)   SpO2:  [95 %-99 %]   BP Location: Left arm       Physical Exam  Vitals reviewed.   Constitutional:       General: She is not in acute distress.  HENT:      Head: Normocephalic and atraumatic.      Mouth/Throat:      Mouth: Mucous membranes are moist.   Eyes:      Extraocular Movements: Extraocular movements intact.   Cardiovascular:      Rate and Rhythm: Normal rate.   Pulmonary:      Effort: Pulmonary effort is normal. No respiratory distress.   Skin:     General: Skin is warm and dry.   Neurological:      Mental Status: She is alert and  "oriented to person, place, and time.              Neurological Exam:   LOC: alert  Attention Span: Good   Language: No aphasia  Articulation: No dysarthria  Orientation: Person, Place, Time   Visual Fields: Full  EOM (CN III, IV, VI): Full/intact  Facial Movement (CN VII): Symmetric facial expression    Motor: Arm left  Normal 5/5  Leg left  Normal 5/5  Arm right  Normal 5/5  Leg right Normal 5/5  Cerebellum: No evidence of appendicular or axial ataxia  Sensation: Intact to light touch  Tone: Normal tone throughout    Laboratory:  CMP:   Recent Labs   Lab 12/13/23  0308   CALCIUM 8.9   ALBUMIN 3.2*   PROT 6.2      K 4.0   CO2 23      BUN 15   CREATININE 1.0   ALKPHOS 78   ALT 22   AST 18   BILITOT 0.4       CBC:   Recent Labs   Lab 12/13/23  0308   WBC 4.28   RBC 4.04   HGB 12.3   HCT 36.0*      MCV 89   MCH 30.4   MCHC 34.2       Lipid Panel:   Recent Labs   Lab 12/08/23  1403   CHOL 181   LDLCALC 128.4   HDL 46   TRIG 33       Coagulation:   Recent Labs   Lab 12/08/23  1214   INR 1.0   APTT 27.0       Platelet Aggregation Study: No results for input(s): "PLTAGG", "PLTAGINTERP", "PLTAGREGLACO", "ADPPLTAGGREG" in the last 168 hours.  Hgb A1C:   Recent Labs   Lab 12/08/23  1403   HGBA1C 5.4       TSH:   Recent Labs   Lab 12/08/23  1403   TSH 0.291*         Diagnostic Results     Brain Imaging:   [facility] 12/8/2023 @1657  Impression:  Stable appearance of the subarachnoid hemorrhage.  No hydrocephalus.  Nonspecific prominent white matter changes.     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  Patchy hypodense areas involving the bilateral subcortical cerebral white matter, nonspecific and possibly reflecting chronic small vessel ischemic change.        Vessel Imaging:  IR Angiogram Carotid Cerebral Bilateral inc Arch 12/12/23  Impression:  Six vessel cerebral angiogram showed no " significant abnormalities to account for patient's subarachnoid hemorrhage.  No aneurysm, arteriovenous malformation or AV fistula.    CTA Head and Neck 12/8/2023   Impression: Stable subarachnoid hemorrhage.  No hydrocephalus.  Nonspecific presumed chronic white matter changes again identified.  No intracranial aneurysm or AVM is identified.        Cardiac Imaging:  TTE 12/9/23    Left Ventricle: The left ventricle is normal in size. Normal wall thickness. Normal wall motion. There is normal systolic function. Ejection fraction by visual approximation is 65%. There is normal diastolic function.    Right Ventricle: Normal right ventricular cavity size. Wall thickness is normal. Right ventricle wall motion  is normal. Systolic function is normal.    Pulmonary Artery: The estimated pulmonary artery systolic pressure is 18 mmHg.    IVC/SVC: Normal venous pressure at 3 mmHg.

## 2023-12-13 NOTE — PROGRESS NOTES
[facility] - Neuro Critical Care  Neurocritical Care  Progress Note    Admit Date: 12/8/2023  Service Date: 12/13/2023  Length of Stay: 5    Subjective:     Chief Complaint: Nontraumatic subarachnoid hemorrhage    History of Present Illness: 59 y/o F w/ PMH migraines, anxiety, and HTN who presented to [facility] with SAH. Patient states that she was working out this morning when she developed a sudden, severe headache, described as among the worst of her life, which prompted her to come to the [facility] ED. According to patient, the headache's improved considerably after she took Excedrin and reduced her activity; if she sits with her eyes closed the pain is 2/10 in intensity and increases to 9/10 with movement. Upon presentation to [facility], patient's BP was measured into the 200s systolics. The patient states that she had a BP into 180s at her last visit with her general practitioner but was not prescribed medication because her pressures were in normal at home. 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 The patient will be admitted to [facility] for further neuro-monitoring and higher level of care.    Hospital Course: 12/9/23: Follow up CTH shows stable SAH. Likely to go for cerebral angiogram later this afternoon.  12/10/23: 1 liter bolus  12/11/23: Post SAH day 3. NAEO. TCDs stable. To go for cerebral angiogram today. Headache pain controlled w/ current pain regimen.  12/12/23: Normal cerebral angiogram. Will go for CTA Friday. If normal, can discharge home. Discontinued Keppra and Nimodipine.     Interval History:  Awaiting stepdown to vascular neurology floor. Improving photophobia and headache. CTA Friday, if normal, discharge home.      Review of Systems   Constitutional:  Negative for fatigue and fever.   HENT: Negative.     Eyes:  Positive for photophobia.   Respiratory: Negative.      Cardiovascular:  Negative for chest pain and palpitations.   Gastrointestinal: Negative.    Endocrine: Negative.    Genitourinary: Negative.    Musculoskeletal: Negative.  Negative for neck stiffness.   Skin: Negative.    Neurological:  Positive for headaches. Negative for dizziness, seizures, syncope, facial asymmetry, speech difficulty, weakness, light-headedness and numbness.   Psychiatric/Behavioral: Negative.         Objective:     Vitals:  Temp: 98.3 °F (36.8 °C)  Pulse: 75  Rhythm: normal sinus rhythm  BP: 133/70  MAP (mmHg): 95  Resp: 11  SpO2: 97 %    Temp  Min: 98 °F (36.7 °C)  Max: 98.3 °F (36.8 °C)  Pulse  Min: 62  Max: 82  BP  Min: 101/53  Max: 146/75  MAP (mmHg)  Min: 73  Max: 107  Resp  Min: 10  Max: 24  SpO2  Min: 95 %  Max: 99 %    12/12 0701 - 12/13 0700  In: 1750 [P.O.:1750]  Out: 2250 [Urine:2250]   Unmeasured Output  Stool Occurrence: 1        Physical Exam  Constitutional:       Appearance: She is not toxic-appearing.   HENT:      Head: Normocephalic.      Nose: Nose normal.      Mouth/Throat:      Mouth: Mucous membranes are moist.   Eyes:      Pupils: Pupils are equal, round, and reactive to light.   Cardiovascular:      Rate and Rhythm: Normal rate and regular rhythm.      Heart sounds: No murmur heard.  Pulmonary:      Effort: Pulmonary effort is normal.      Breath sounds: No stridor. No wheezing.   Abdominal:      General: Abdomen is flat. There is no distension.      Palpations: Abdomen is soft.      Tenderness: There is no abdominal tenderness.   Musculoskeletal:         General: Normal range of motion.      Cervical back: Normal range of motion.      Right lower leg: No edema.      Left lower leg: No edema.   Skin:     General: Skin is warm.   Neurological:      Mental Status: She is alert.      Comments: GCS 15  PEERL  5/5 BL UE and LE  EOMI  Sensation in tact  Cranial nerves in tact   Psychiatric:         Mood and Affect: Mood normal.         Behavior: Behavior normal.          Thought Content: Thought content normal.         Judgment: Judgment normal.              Medications:  Continuous ScheduledamLODIPine, 5 mg, Daily  enoxparin, 40 mg, Q24H (prophylaxis, 1700)  gabapentin, 300 mg, TID  methocarbamoL, 500 mg, QID    PRNacetaminophen, 650 mg, Q4H PRN  labetalol, 10 mg, Q15 Min PRN  magnesium oxide, 800 mg, PRN  magnesium oxide, 800 mg, PRN  melatonin, 6 mg, Nightly PRN  ondansetron, 8 mg, Q8H PRN  oxyCODONE, 5 mg, Q4H PRN  potassium bicarbonate, 35 mEq, PRN  potassium bicarbonate, 50 mEq, PRN  potassium bicarbonate, 60 mEq, PRN  potassium, sodium phosphates, 2 packet, PRN  potassium, sodium phosphates, 2 packet, PRN  potassium, sodium phosphates, 2 packet, PRN      Today I personally reviewed pertinent medications, lines/drains/airways, imaging, cardiology results, laboratory results, microbiology results,     Diet  Diet Adult Regular (IDDSI Level 7) Standard Tray  Diet Adult Regular (IDDSI Level 7) Standard Tray        Assessment/Plan:     Neuro  * Nontraumatic subarachnoid hemorrhage  59 y/o F w/ PMH migraines, anxiety, and HTN who [place] [facility] [place]. Patient states that she was working out this morning when she developed a sudden, severe headache, described as among the worst of her life, which prompted her to come [place] the [facility] [place]. According to patient, the headache's improved considerably after she took Excedrin and reduced her activity; if she sits with her eyes closed the pain is 2/10 in intensity and increases to 9/10 with movement. [place] [facility] patient's BP was measured into the 200s systolics. The patient states that she had a BP into 180s at her last visit with her general practitioner but was not prescribed medication because her pressures were in normal at home. 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 The  patient will be admitted to [facility] for further neuro-monitoring and higher level of care.    Plan:  -Admit to [facility]  -NSGY and [facility] Neuro following  -SBP <140  -Daily TCDs  -Follow up CT 4PM today  -Daily CBC, CMP, Mg, Phos, Lipids  -Nimodipine 60 mg q4hrs   -Keppra 500 mg BID 7 days  -Q1 neurochecks   -Pain control  -Consider echocardiogram  -Possible cerebral angiogram w/ IR per NSGY    12/9: Will go for cerebral angiogram w/ neuro IR today to assess for presence of aneurysm or AVM  12/10: angio today    12/11: To go for cerebral angiogram today. TCDs stable, showing no vasospasm.     12/12: Cerebral angiogram normal. To go for CTA Friday; can discharge home if normal. Discontinued Keppra and Nimodipine. Will continue daily TCDs.    Cerebral brain hemorrhage        Aggressive risk factor modification: HTN     Rehab efforts: The patient has been evaluated by a stroke team provider and the therapy needs have been fully considered based off the presenting complaints and exam findings. The following therapy evaluations are needed: None    Diagnostics ordered/pending: Other: Cerebral angiogram, Echocardiogram . CTA    VTE prophylaxis: Enoxaparin    BP parameters: SAH: <140        Cardiac/Vascular  Elevated cholesterol  Hx of elevated lipoproteins   Will get new lipid panel     Essential hypertension  Previously on Losartan-HCTZ for HTN. Has not been on medication for extended period of time due to reported normal home pressures.  Will keep SBP <140 post SAH. Currently on Cardene drip. Will add Labetolol and Hydralazine PRNs.     12/9: BP controlled of cardene drip. Amlodipine 5mg added.              The patient is being Prophylaxed for:  Venous Thromboembolism with: Chemical  Stress Ulcer with: Not Applicable   Ventilator Pneumonia with: not applicable    Activity Orders            Progressive Mobility Protocol (mobilize patient to their highest level of functioning at least twice daily) starting at 12/11 2000    Diet Adult  Regular (IDDSI Level 7) Standard Tray: Regular starting at 12/11 1613    Progressive Mobility Protocol (mobilize patient to their highest level of functioning at least twice daily) starting at 12/08 2000    Turn patient starting at 12/08 1400    Elevate HOB starting at 12/08 1324          Full Code    Car Valentine MD  Neurocritical Care  [facility] - Neuro Critical Care

## 2023-12-14 PROBLEM — G93.6 VASOGENIC CEREBRAL EDEMA: Status: ACTIVE | Noted: 2023-12-14

## 2023-12-14 LAB
ALBUMIN SERPL BCP-MCNC: 3.3 G/DL (ref 3.5–5.2)
ALP SERPL-CCNC: 83 U/L (ref 55–135)
ALT SERPL W/O P-5'-P-CCNC: 20 U/L (ref 10–44)
ANION GAP SERPL CALC-SCNC: 8 MMOL/L (ref 8–16)
AST SERPL-CCNC: 18 U/L (ref 10–40)
BASOPHILS # BLD AUTO: 0.04 K/UL (ref 0–0.2)
BASOPHILS NFR BLD: 1.1 % (ref 0–1.9)
BILIRUB SERPL-MCNC: 0.3 MG/DL (ref 0.1–1)
BUN SERPL-MCNC: 10 MG/DL (ref 6–20)
CALCIUM SERPL-MCNC: 8.8 MG/DL (ref 8.7–10.5)
CHLORIDE SERPL-SCNC: 107 MMOL/L (ref 95–110)
CO2 SERPL-SCNC: 22 MMOL/L (ref 23–29)
CREAT SERPL-MCNC: 0.9 MG/DL (ref 0.5–1.4)
DIFFERENTIAL METHOD: ABNORMAL
EOSINOPHIL # BLD AUTO: 0.3 K/UL (ref 0–0.5)
EOSINOPHIL NFR BLD: 7.6 % (ref 0–8)
ERYTHROCYTE [DISTWIDTH] IN BLOOD BY AUTOMATED COUNT: 12.5 % (ref 11.5–14.5)
EST. GFR  (NO RACE VARIABLE): >60 ML/MIN/1.73 M^2
GLUCOSE SERPL-MCNC: 106 MG/DL (ref 70–110)
HCT VFR BLD AUTO: 35.6 % (ref 37–48.5)
HGB BLD-MCNC: 12.8 G/DL (ref 12–16)
IMM GRANULOCYTES # BLD AUTO: 0 K/UL (ref 0–0.04)
IMM GRANULOCYTES NFR BLD AUTO: 0 % (ref 0–0.5)
LYMPHOCYTES # BLD AUTO: 1.3 K/UL (ref 1–4.8)
LYMPHOCYTES NFR BLD: 37.2 % (ref 18–48)
MAGNESIUM SERPL-MCNC: 1.9 MG/DL (ref 1.6–2.6)
MCH RBC QN AUTO: 30.5 PG (ref 27–31)
MCHC RBC AUTO-ENTMCNC: 36 G/DL (ref 32–36)
MCV RBC AUTO: 85 FL (ref 82–98)
MONOCYTES # BLD AUTO: 0.4 K/UL (ref 0.3–1)
MONOCYTES NFR BLD: 9.9 % (ref 4–15)
NEUTROPHILS # BLD AUTO: 1.6 K/UL (ref 1.8–7.7)
NEUTROPHILS NFR BLD: 44.2 % (ref 38–73)
NRBC BLD-RTO: 0 /100 WBC
PHOSPHATE SERPL-MCNC: 3.2 MG/DL (ref 2.7–4.5)
PLATELET # BLD AUTO: 226 K/UL (ref 150–450)
PMV BLD AUTO: 9 FL (ref 9.2–12.9)
POTASSIUM SERPL-SCNC: 3.7 MMOL/L (ref 3.5–5.1)
PROT SERPL-MCNC: 6.3 G/DL (ref 6–8.4)
RBC # BLD AUTO: 4.19 M/UL (ref 4–5.4)
SODIUM SERPL-SCNC: 137 MMOL/L (ref 136–145)
WBC # BLD AUTO: 3.55 K/UL (ref 3.9–12.7)

## 2023-12-14 PROCEDURE — 25000003 PHARM REV CODE 250: Performed by: PHYSICIAN ASSISTANT

## 2023-12-14 PROCEDURE — 25000003 PHARM REV CODE 250

## 2023-12-14 PROCEDURE — 63600175 PHARM REV CODE 636 W HCPCS: Performed by: NURSE PRACTITIONER

## 2023-12-14 PROCEDURE — 63600175 PHARM REV CODE 636 W HCPCS: Performed by: PHYSICIAN ASSISTANT

## 2023-12-14 PROCEDURE — 36415 COLL VENOUS BLD VENIPUNCTURE: CPT

## 2023-12-14 PROCEDURE — 99233 SBSQ HOSP IP/OBS HIGH 50: CPT | Mod: ,,, | Performed by: STUDENT IN AN ORGANIZED HEALTH CARE EDUCATION/TRAINING PROGRAM

## 2023-12-14 PROCEDURE — 11000001 HC ACUTE MED/SURG PRIVATE ROOM

## 2023-12-14 PROCEDURE — 85025 COMPLETE CBC W/AUTO DIFF WBC: CPT

## 2023-12-14 PROCEDURE — 99233 PR SUBSEQUENT HOSPITAL CARE,LEVL III: ICD-10-PCS | Mod: ,,, | Performed by: STUDENT IN AN ORGANIZED HEALTH CARE EDUCATION/TRAINING PROGRAM

## 2023-12-14 PROCEDURE — 84100 ASSAY OF PHOSPHORUS: CPT

## 2023-12-14 PROCEDURE — 63600175 PHARM REV CODE 636 W HCPCS

## 2023-12-14 PROCEDURE — 80053 COMPREHEN METABOLIC PANEL: CPT

## 2023-12-14 PROCEDURE — 83735 ASSAY OF MAGNESIUM: CPT

## 2023-12-14 RX ORDER — GABAPENTIN 300 MG/1
300 CAPSULE ORAL 3 TIMES DAILY
Qty: 90 CAPSULE | Refills: 1 | Status: SHIPPED | OUTPATIENT
Start: 2023-12-15 | End: 2023-12-17 | Stop reason: HOSPADM

## 2023-12-14 RX ORDER — METOCLOPRAMIDE HYDROCHLORIDE 5 MG/ML
10 INJECTION INTRAMUSCULAR; INTRAVENOUS EVERY 6 HOURS PRN
Status: DISCONTINUED | OUTPATIENT
Start: 2023-12-14 | End: 2023-12-17 | Stop reason: HOSPADM

## 2023-12-14 RX ORDER — MAGNESIUM SULFATE HEPTAHYDRATE 40 MG/ML
2 INJECTION, SOLUTION INTRAVENOUS ONCE
Status: COMPLETED | OUTPATIENT
Start: 2023-12-14 | End: 2023-12-15

## 2023-12-14 RX ORDER — AMLODIPINE BESYLATE 10 MG/1
10 TABLET ORAL DAILY
Status: DISCONTINUED | OUTPATIENT
Start: 2023-12-15 | End: 2023-12-17 | Stop reason: HOSPADM

## 2023-12-14 RX ORDER — MAGNESIUM SULFATE HEPTAHYDRATE 40 MG/ML
2 INJECTION, SOLUTION INTRAVENOUS ONCE
Status: COMPLETED | OUTPATIENT
Start: 2023-12-14 | End: 2023-12-14

## 2023-12-14 RX ORDER — ACETAMINOPHEN 500 MG
1000 TABLET ORAL EVERY 8 HOURS
Status: DISCONTINUED | OUTPATIENT
Start: 2023-12-14 | End: 2023-12-17 | Stop reason: HOSPADM

## 2023-12-14 RX ORDER — ACETAMINOPHEN 500 MG
1000 TABLET ORAL EVERY 6 HOURS PRN
Qty: 30 TABLET | Refills: 0 | Status: SHIPPED | OUTPATIENT
Start: 2023-12-14 | End: 2024-03-28

## 2023-12-14 RX ORDER — AMLODIPINE BESYLATE 10 MG/1
10 TABLET ORAL DAILY
Qty: 30 TABLET | Refills: 1 | Status: SHIPPED | OUTPATIENT
Start: 2023-12-15 | End: 2024-02-27 | Stop reason: ALTCHOICE

## 2023-12-14 RX ORDER — METHOCARBAMOL 500 MG/1
500 TABLET, FILM COATED ORAL 4 TIMES DAILY PRN
Qty: 30 TABLET | Refills: 1 | Status: SHIPPED | OUTPATIENT
Start: 2023-12-14

## 2023-12-14 RX ADMIN — OXYCODONE HYDROCHLORIDE 5 MG: 5 TABLET ORAL at 03:12

## 2023-12-14 RX ADMIN — ONDANSETRON 8 MG: 8 TABLET, ORALLY DISINTEGRATING ORAL at 03:12

## 2023-12-14 RX ADMIN — MAGNESIUM SULFATE 2 G: 2 INJECTION INTRAVENOUS at 10:12

## 2023-12-14 RX ADMIN — Medication 6 MG: at 10:12

## 2023-12-14 RX ADMIN — METHOCARBAMOL 500 MG: 500 TABLET ORAL at 09:12

## 2023-12-14 RX ADMIN — OXYCODONE HYDROCHLORIDE 5 MG: 5 TABLET ORAL at 07:12

## 2023-12-14 RX ADMIN — METOCLOPRAMIDE 10 MG: 5 INJECTION, SOLUTION INTRAMUSCULAR; INTRAVENOUS at 07:12

## 2023-12-14 RX ADMIN — OXYCODONE HYDROCHLORIDE 5 MG: 5 TABLET ORAL at 09:12

## 2023-12-14 RX ADMIN — GABAPENTIN 300 MG: 300 CAPSULE ORAL at 09:12

## 2023-12-14 RX ADMIN — ONDANSETRON 8 MG: 8 TABLET, ORALLY DISINTEGRATING ORAL at 09:12

## 2023-12-14 RX ADMIN — ACETAMINOPHEN 1000 MG: 500 TABLET ORAL at 09:12

## 2023-12-14 RX ADMIN — ACETAMINOPHEN 1000 MG: 500 TABLET ORAL at 02:12

## 2023-12-14 RX ADMIN — METHOCARBAMOL 500 MG: 500 TABLET ORAL at 02:12

## 2023-12-14 RX ADMIN — AMLODIPINE BESYLATE 5 MG: 5 TABLET ORAL at 09:12

## 2023-12-14 RX ADMIN — LABETALOL HYDROCHLORIDE 10 MG: 5 INJECTION, SOLUTION INTRAVENOUS at 08:12

## 2023-12-14 RX ADMIN — MAGNESIUM SULFATE HEPTAHYDRATE 2 G: 40 INJECTION, SOLUTION INTRAVENOUS at 02:12

## 2023-12-14 RX ADMIN — GABAPENTIN 300 MG: 300 CAPSULE ORAL at 04:12

## 2023-12-14 RX ADMIN — ENOXAPARIN SODIUM 40 MG: 40 INJECTION SUBCUTANEOUS at 04:12

## 2023-12-14 RX ADMIN — METHOCARBAMOL 500 MG: 500 TABLET ORAL at 04:12

## 2023-12-14 RX ADMIN — METHOCARBAMOL 500 MG: 500 TABLET ORAL at 08:12

## 2023-12-14 RX ADMIN — GABAPENTIN 300 MG: 300 CAPSULE ORAL at 08:12

## 2023-12-14 NOTE — PROGRESS NOTES
[facility] - Neurosurgery ([facility])  Vascular Neurology  Comprehensive Stroke Center  Progress Note    Assessment/Plan:     * Nontraumatic subarachnoid hemorrhage  Laura Newell is a 58 y.o. female with a significant medical history of asthma who presents to the hospital as a transfer from [facility] for evaluation of SAH.  She was hypertensive on presentation to the OSH ED, 200s/100s. CT with perimesencephalic SAH. CTA negative for aneurysm or other cerebrovascular abnormalities. TTE EF 65%, no LAE, normal wall thickness noted. DSA unremarkable for vascular abnormalities.    Patient stepped down. HA more uncontrolled today with new phonophobia, STAT CTH pending, increasing amlodipine, adjusting HA medication regimen, repeating CTA tomorrow. Daily TCDs discontinue as they are not indicated for perimesencephalic SAH.       Antithrombotics for secondary stroke prevention: Antiplatelets: None: Intracerebral Hemorrhage    Statins for secondary stroke prevention and hyperlipidemia, if present:   Statins: None: Reason: SAH    Aggressive risk factor modification: None     Rehab efforts: The patient has been evaluated by a stroke team provider and the therapy needs have been fully considered based off the presenting complaints and exam findings. The following therapy evaluations are needed: PT evaluate and treat, OT evaluate and treat    Diagnostics ordered/pending: Other: Daily TCDs    VTE prophylaxis: Enoxaparin 40 mg SQ every 24 hours  Mechanical prophylaxis: Place SCDs    BP parameters: SAH: SBP<140        Cephalalgia  Stepped down on scheduled gabapentin and robaxin, prn tylenol and oxy  Starting scheduled tylenol 1000 mg TID qh  Will adjust medications further as needed    Elevated cholesterol  -Stroke risk factor.  Noted in the patient's record as of 6/2020.  Patient not currently prescribed statin.  -Lipid panel WNL  -statin not indicated    Essential hypertension  -Stroke risk factor.  Noted in  the patient's record as of 8/2019.  Patient not currently prescribed medications.  -Initial SBP 200s.  -SBP <140  -Initially on cardene, currently off at this time  -on amlodipine 5 mg daily, increased to 10 mg today  -PRN labetalol  -Monitor for need to d/c on home BP meds         12/9/23 patient reports improvement in HA, rates pain 2/10, persistent photophobia, exam nonfocal, CTA negative for aneurysm or other cerebrovascular abnormality, DSA pending, TCD pending  12/12/23: NAEON. Neuro exam stable. Patient continues to have intermittent headaches, neck pain, and nausea. Patient reports relief with zofran. Tylenol was given for headache and neck pain. Daily TCDs in progress, no evidence of vasospasms noted for today's TCD. Patient stable for NPU stepdown.  12/13/23: NAEON. Neuro exam stable. Patient continues to have persistent photophobia and headaches. TCDs stable. Patient pending NPU stepdown.  12/14/23: HA more uncontrolled today with new phonophobia, STAT CTH pending, increasing amlodipine, adjusting HA medication regimen, repeating CTA tomorrow    STROKE DOCUMENTATION        NIH Scale:  1a. Level of Consciousness: 0-->Alert, keenly responsive  1b. LOC Questions: 0-->Answers both questions correctly  1c. LOC Commands: 0-->Performs both tasks correctly  2. Best Gaze: 0-->Normal  3. Visual: 0-->No visual loss  4. Facial Palsy: 0-->Normal symmetrical movements  5a. Motor Arm, Left: 0-->No drift, limb holds 90 (or 45) degrees for full 10 secs  5b. Motor Arm, Right: 0-->No drift, limb holds 90 (or 45) degrees for full 10 secs  6a. Motor Leg, Left: 0-->No drift, leg holds 30 degree position for full 5 secs  6b. Motor Leg, Right: 0-->No drift, leg holds 30 degree position for full 5 secs  7. Limb Ataxia: 0-->Absent  8. Sensory: 0-->Normal, no sensory loss  9. Best Language: 0-->No aphasia, normal  10. Dysarthria: 0-->Normal  11. Extinction and Inattention (formerly Neglect): 0-->No abnormality  Total (NIH Stroke  Scale): 0       Modified Hayden Score: 0  Balbir Coma Scale:    ABCD2 Score:    [id number] Score:   HAS -BLED Score:   ICH Score:0  Hunt & Bravo Classification:Grade I     Hemorrhagic change of an Ischemic Stroke: Does this patient have an ischemic stroke with hemorrhagic changes? No     Neurologic Chief Complaint: HA and photophobia    Subjective:     Interval History: Patient is seen for follow-up neurological assessment and treatment recommendations: HA more uncontrolled today with new phonophobia, STAT CTH pending, increasing amlodipine, adjusting HA medication regimen, repeating CTA tomorrow    HPI, Past Medical, Family, and Social History remains the same as documented in the initial encounter.     Review of Systems   Eyes:  Positive for photophobia.   Neurological:  Positive for headaches. Negative for facial asymmetry, speech difficulty, weakness and numbness.        Phonophobia     Scheduled Meds:   acetaminophen  1,000 mg Oral Q8H    [START ON 12/15/2023] amLODIPine  10 mg Oral Daily    enoxparin  40 mg Subcutaneous Q24H (prophylaxis, 1700)    gabapentin  300 mg Oral TID    methocarbamoL  500 mg Oral QID     Continuous Infusions:      PRN Meds:labetalol, magnesium oxide, magnesium oxide, melatonin, ondansetron, oxyCODONE    Objective:     Vital Signs (Most Recent):  Temp: 98.3 °F (36.8 °C) (12/14/23 0807)  Pulse: 70 (12/14/23 0807)  Resp: 16 (12/14/23 0906)  BP: (!) 157/77 (12/14/23 0807)  SpO2: 99 % (12/14/23 0807)  BP Location: Left arm    Vital Signs Range (Last 24H):  Temp:  [97 °F (36.1 °C)-98.3 °F (36.8 °C)]   Pulse:  [62-82]   Resp:  [11-18]   BP: (113-157)/(59-84)   SpO2:  [95 %-99 %]   BP Location: Left arm       Physical Exam  Vitals reviewed.   Constitutional:       General: She is not in acute distress.  HENT:      Head: Normocephalic and atraumatic.   Cardiovascular:      Rate and Rhythm: Normal rate.   Pulmonary:      Effort: Pulmonary effort is normal. No respiratory distress.   Skin:      "General: Skin is warm and dry.   Neurological:      Mental Status: She is alert.              Neurological Exam:   LOC: alert  Attention Span: Good   Language: No aphasia  Articulation: No dysarthria  Orientation: Person, Place, Time   Visual Fields: Full  EOM (CN III, IV, VI): Full/intact  Facial Movement (CN VII): Symmetric facial expression    Motor: Arm left  Normal 5/5  Leg left  Normal 5/5  Arm right  Normal 5/5  Leg right Normal 5/5  Cerebellum: No evidence of appendicular or axial ataxia  Sensation: Intact to light touch  Tone: Normal tone throughout    Laboratory:  CMP:   Recent Labs   Lab 12/14/23  0406   CALCIUM 8.8   ALBUMIN 3.3*   PROT 6.3      K 3.7   CO2 22*      BUN 10   CREATININE 0.9   ALKPHOS 83   ALT 20   AST 18   BILITOT 0.3       CBC:   Recent Labs   Lab 12/14/23  0406   WBC 3.55*   RBC 4.19   HGB 12.8   HCT 35.6*      MCV 85   MCH 30.5   MCHC 36.0       Lipid Panel:   Recent Labs   Lab 12/08/23  1403   CHOL 181   LDLCALC 128.4   HDL 46   TRIG 33       Coagulation:   Recent Labs   Lab 12/08/23  1214   INR 1.0   APTT 27.0       Platelet Aggregation Study: No results for input(s): "PLTAGG", "PLTAGINTERP", "PLTAGREGLACO", "ADPPLTAGGREG" in the last 168 hours.  Hgb A1C:   Recent Labs   Lab 12/08/23  1403   HGBA1C 5.4       TSH:   Recent Labs   Lab 12/08/23  1403   TSH 0.291*         Diagnostic Results     Brain Imaging:   [facility] 12/8/2023 @1657  Impression:  Stable appearance of the subarachnoid hemorrhage.  No hydrocephalus.  Nonspecific prominent white matter changes.     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  Patchy hypodense areas involving the bilateral subcortical cerebral white matter, nonspecific and possibly reflecting chronic small vessel ischemic change.        Vessel Imaging:  IR Angiogram Carotid Cerebral Bilateral inc Arch " 12/12/23  Impression:  Six vessel cerebral angiogram showed no significant abnormalities to account for patient's subarachnoid hemorrhage.  No aneurysm, arteriovenous malformation or AV fistula.    CTA Head and Neck 12/8/2023   Impression: Stable subarachnoid hemorrhage.  No hydrocephalus.  Nonspecific presumed chronic white matter changes again identified.  No intracranial aneurysm or AVM is identified.        Cardiac Imaging:  TTE 12/9/23    Left Ventricle: The left ventricle is normal in size. Normal wall thickness. Normal wall motion. There is normal systolic function. Ejection fraction by visual approximation is 65%. There is normal diastolic function.    Right Ventricle: Normal right ventricular cavity size. Wall thickness is normal. Right ventricle wall motion  is normal. Systolic function is normal.    Pulmonary Artery: The estimated pulmonary artery systolic pressure is 18 mmHg.    IVC/SVC: Normal venous pressure at 3 mmHg.    Cande Ham PA-C  Comprehensive Stroke Center  Department of Vascular Neurology   [facility] Neurosurgery \[facility] [place] [facility]\""

## 2023-12-14 NOTE — SUBJECTIVE & OBJECTIVE
Neurologic Chief Complaint: HA and photophobia    Subjective:     Interval History: Patient is seen for follow-up neurological assessment and treatment recommendations: HA more uncontrolled today with new phonophobia, STAT CTH pending, increasing amlodipine, adjusting HA medication regimen, repeating CTA tomorrow    HPI, Past Medical, Family, and Social History remains the same as documented in the initial encounter.     Review of Systems   Eyes:  Positive for photophobia.   Neurological:  Positive for headaches. Negative for facial asymmetry, speech difficulty, weakness and numbness.        Phonophobia     Scheduled Meds:   acetaminophen  1,000 mg Oral Q8H    [START ON 12/15/2023] amLODIPine  10 mg Oral Daily    enoxparin  40 mg Subcutaneous Q24H (prophylaxis, 1700)    gabapentin  300 mg Oral TID    methocarbamoL  500 mg Oral QID     Continuous Infusions:      PRN Meds:labetalol, magnesium oxide, magnesium oxide, melatonin, ondansetron, oxyCODONE    Objective:     Vital Signs (Most Recent):  Temp: 98.3 °F (36.8 °C) (12/14/23 0807)  Pulse: 70 (12/14/23 0807)  Resp: 16 (12/14/23 0906)  BP: (!) 157/77 (12/14/23 0807)  SpO2: 99 % (12/14/23 0807)  BP Location: Left arm    Vital Signs Range (Last 24H):  Temp:  [97 °F (36.1 °C)-98.3 °F (36.8 °C)]   Pulse:  [62-82]   Resp:  [11-18]   BP: (113-157)/(59-84)   SpO2:  [95 %-99 %]   BP Location: Left arm       Physical Exam  Vitals reviewed.   Constitutional:       General: She is not in acute distress.  HENT:      Head: Normocephalic and atraumatic.   Cardiovascular:      Rate and Rhythm: Normal rate.   Pulmonary:      Effort: Pulmonary effort is normal. No respiratory distress.   Skin:     General: Skin is warm and dry.   Neurological:      Mental Status: She is alert.              Neurological Exam:   LOC: alert  Attention Span: Good   Language: No aphasia  Articulation: No dysarthria  Orientation: Person, Place, Time   Visual Fields: Full  EOM (CN III, IV, VI):  "Full/intact  Facial Movement (CN VII): Symmetric facial expression    Motor: Arm left  Normal 5/5  Leg left  Normal 5/5  Arm right  Normal 5/5  Leg right Normal 5/5  Cerebellum: No evidence of appendicular or axial ataxia  Sensation: Intact to light touch  Tone: Normal tone throughout    Laboratory:  CMP:   Recent Labs   Lab 12/14/23  0406   CALCIUM 8.8   ALBUMIN 3.3*   PROT 6.3      K 3.7   CO2 22*      BUN 10   CREATININE 0.9   ALKPHOS 83   ALT 20   AST 18   BILITOT 0.3       CBC:   Recent Labs   Lab 12/14/23  0406   WBC 3.55*   RBC 4.19   HGB 12.8   HCT 35.6*      MCV 85   MCH 30.5   MCHC 36.0       Lipid Panel:   Recent Labs   Lab 12/08/23  1403   CHOL 181   LDLCALC 128.4   HDL 46   TRIG 33       Coagulation:   Recent Labs   Lab 12/08/23  1214   INR 1.0   APTT 27.0       Platelet Aggregation Study: No results for input(s): "PLTAGG", "PLTAGINTERP", "PLTAGREGLACO", "ADPPLTAGGREG" in the last 168 hours.  Hgb A1C:   Recent Labs   Lab 12/08/23  1403   HGBA1C 5.4       TSH:   Recent Labs   Lab 12/08/23  1403   TSH 0.291*         Diagnostic Results     Brain Imaging:   [facility] 12/8/2023 @1657  Impression:  Stable appearance of the subarachnoid hemorrhage.  No hydrocephalus.  Nonspecific prominent white matter changes.     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  Patchy hypodense areas involving the bilateral subcortical cerebral white matter, nonspecific and possibly reflecting chronic small vessel ischemic change.        Vessel Imaging:  IR Angiogram Carotid Cerebral Bilateral inc Arch 12/12/23  Impression:  Six vessel cerebral angiogram showed no significant abnormalities to account for patient's subarachnoid hemorrhage.  No aneurysm, arteriovenous malformation or AV fistula.    CTA Head and Neck 12/8/2023   Impression: Stable subarachnoid hemorrhage.  No " hydrocephalus.  Nonspecific presumed chronic white matter changes again identified.  No intracranial aneurysm or AVM is identified.        Cardiac Imaging:  TTE 12/9/23    Left Ventricle: The left ventricle is normal in size. Normal wall thickness. Normal wall motion. There is normal systolic function. Ejection fraction by visual approximation is 65%. There is normal diastolic function.    Right Ventricle: Normal right ventricular cavity size. Wall thickness is normal. Right ventricle wall motion  is normal. Systolic function is normal.    Pulmonary Artery: The estimated pulmonary artery systolic pressure is 18 mmHg.    IVC/SVC: Normal venous pressure at 3 mmHg.

## 2023-12-14 NOTE — PLAN OF CARE
Problem: Adult Inpatient Plan of Care  Goal: Plan of Care Review  Outcome: Ongoing, Progressing  Goal: Patient-Specific Goal (Individualized)  Outcome: Ongoing, Progressing  Goal: Absence of Hospital-Acquired Illness or Injury  Outcome: Ongoing, Progressing  Goal: Optimal Comfort and Wellbeing  Outcome: Ongoing, Progressing  Goal: Readiness for Transition of Care  Outcome: Ongoing, Progressing     Problem: Adjustment to Illness (Stroke, Hemorrhagic)  Goal: Optimal Coping  Outcome: Ongoing, Progressing     Problem: Bowel Elimination Impaired (Stroke, Hemorrhagic)  Goal: Effective Bowel Elimination  Outcome: Ongoing, Progressing     Problem: Cerebral Tissue Perfusion (Stroke, Hemorrhagic)  Goal: Optimal Cerebral Tissue Perfusion  Outcome: Ongoing, Progressing     Problem: Pain (Stroke, Hemorrhagic)  Goal: Acceptable Pain Control  Outcome: Ongoing, Progressing

## 2023-12-14 NOTE — NURSING TRANSFER
Nursing Transfer Note      12/13/2023   10:47 PM    Nurse giving handoff:Kathy  Nurse receiving handoff:Brissa    Reason patient is being transferred: stepdown    Transfer [id number] to [id number]    Transfer via wheelchair    Transfer with cardiac monitoring    Transported by RN    Transfer Vital Signs:  Blood Pressure:128/59   Heart Rate:66  O2:96  Temperature:98.2  Respirations:17    Telemetry: box 0745  Order for Tele Monitor? Yes    Additional Lines:     4eyes on Skin: yes    Medicines sent: N/A    Any special needs or follow-up needed: N/A    Patient belongings transferred with patient: Yes    Chart send with patient: Yes    Notified: spouse    Patient reassessed at: 12/13/23 2145  1  Upon arrival to floor: cardiac monitor applied, patient oriented to room, call bell in reach, and bed in lowest position

## 2023-12-14 NOTE — SUBJECTIVE & OBJECTIVE
"Interval History: 12/14: PBD 6. NAEO. Complains of persistent headache but no new complaints. Exam stable.     Medications:  Continuous Infusions:  Scheduled Meds:   acetaminophen  1,000 mg Oral Q8H    [START ON 12/15/2023] amLODIPine  10 mg Oral Daily    enoxparin  40 mg Subcutaneous Q24H (prophylaxis, 1700)    gabapentin  300 mg Oral TID    magnesium sulfate IVPB  2 g Intravenous Once    methocarbamoL  500 mg Oral QID     PRN Meds:labetalol, magnesium oxide, magnesium oxide, melatonin, ondansetron, oxyCODONE     Review of Systems  Objective:     Weight: 64.8 kg (142 lb 13.7 oz)  Body mass index is 24.52 kg/m².  Vital Signs (Most Recent):  Temp: 98.5 °F (36.9 °C) (12/14/23 1108)  Pulse: 69 (12/14/23 1126)  Resp: 18 (12/14/23 1108)  BP: (!) 140/68 (12/14/23 1108)  SpO2: 98 % (12/14/23 1108) Vital Signs (24h Range):  Temp:  [97 °F (36.1 °C)-98.5 °F (36.9 °C)] 98.5 °F (36.9 °C)  Pulse:  [62-81] 69  Resp:  [13-18] 18  SpO2:  [95 %-99 %] 98 %  BP: (113-157)/(59-84) 140/68      Physical Exam  General: well developed, well nourished, no distress.   Head: normocephalic, atraumatic  Mental Status: Awake, Alert, Oriented  Speech: Clear with content appropriate to conversation  Cranial nerves: CN II-XII grossly intact.   Sensory: intact to light touch throughout    Motor Strength:Moves all extremities spontaneously with good tone.  Full strength upper and lower extremities. No abnormal movements seen.     Pronator Drift: no drift noted  Finger-to-nose: Intact bilaterally    Significant Labs:  Recent Labs   Lab 12/13/23  0308 12/14/23  0406   GLU 99 106    137   K 4.0 3.7    107   CO2 23 22*   BUN 15 10   CREATININE 1.0 0.9   CALCIUM 8.9 8.8   MG 1.9 1.9     Recent Labs   Lab 12/13/23  0308 12/14/23  0406   WBC 4.28 3.55*   HGB 12.3 12.8   HCT 36.0* 35.6*    226     No results for input(s): "LABPT", "INR", "APTT" in the last 48 hours.  Microbiology Results (last 7 days)       ** No results found for the last " 168 hours. **          All pertinent labs from the last 24 hours have been reviewed.    Significant Diagnostics:  CT: CT Head Without Contrast    Result Date: 12/14/2023  Resolution of the previously seen subarachnoid hemorrhage adjacent to the katiuska.  No new hemorrhage identified.    I have personally reviewed the above referenced imaging.

## 2023-12-14 NOTE — PROGRESS NOTES
[facility] - Neurosurgery ([facility])  Neurosurgery  Progress Note    Subjective:     History of Present Illness: 59 yo female with history of HTN transferred from [facility] for NSGY evaluation of subarachnoid hemorrhage. She presented to the emergency room with complaint of acute onset headache while working out this am. States that she took Excedrin migraine which helped a little bit.  States that she has never had a headache like this before.  Patient's systolic blood pressures greater than 200 upon arrival.  CTH at OSH shows small hemorrhage along the anterior and right lateral aspects of the katiuska, no IVH. Pt is not on blood thinners.      Post-Op Info:  Procedure(s) (LRB):  ANGIOGRAM-CEREBRAL; Need Anesthesia; Dr. Byron Iniguez (N/A)   4 Days Post-Op   Interval History: 12/14: PBD 6. NAEO. Complains of persistent headache but no new complaints. Exam stable.     Medications:  Continuous Infusions:  Scheduled Meds:   acetaminophen  1,000 mg Oral Q8H    [START ON 12/15/2023] amLODIPine  10 mg Oral Daily    enoxparin  40 mg Subcutaneous Q24H (prophylaxis, 1700)    gabapentin  300 mg Oral TID    magnesium sulfate IVPB  2 g Intravenous Once    methocarbamoL  500 mg Oral QID     PRN Meds:labetalol, magnesium oxide, magnesium oxide, melatonin, ondansetron, oxyCODONE     Review of Systems  Objective:     Weight: 64.8 kg (142 lb 13.7 oz)  Body mass index is 24.52 kg/m².  Vital Signs (Most Recent):  Temp: 98.5 °F (36.9 °C) (12/14/23 1108)  Pulse: 69 (12/14/23 1126)  Resp: 18 (12/14/23 1108)  BP: (!) 140/68 (12/14/23 1108)  SpO2: 98 % (12/14/23 1108) Vital Signs (24h Range):  Temp:  [97 °F (36.1 °C)-98.5 °F (36.9 °C)] 98.5 °F (36.9 °C)  Pulse:  [62-81] 69  Resp:  [13-18] 18  SpO2:  [95 %-99 %] 98 %  BP: (113-157)/(59-84) 140/68      Physical Exam  General: well developed, well nourished, no distress.   Head: normocephalic, atraumatic  Mental Status: Awake, Alert, Oriented  Speech: Clear with content appropriate to  "conversation  Cranial nerves: CN II-XII grossly intact.   Sensory: intact to light touch throughout    Motor Strength:Moves all extremities spontaneously with good tone.  Full strength upper and lower extremities. No abnormal movements seen.     Pronator Drift: no drift noted  Finger-to-nose: Intact bilaterally    Significant Labs:  Recent Labs   Lab 12/13/23  0308 12/14/23  0406   GLU 99 106    137   K 4.0 3.7    107   CO2 23 22*   BUN 15 10   CREATININE 1.0 0.9   CALCIUM 8.9 8.8   MG 1.9 1.9     Recent Labs   Lab 12/13/23  0308 12/14/23  0406   WBC 4.28 3.55*   HGB 12.3 12.8   HCT 36.0* 35.6*    226     No results for input(s): "LABPT", "INR", "APTT" in the last 48 hours.  Microbiology Results (last 7 days)       ** No results found for the last 168 hours. **          All pertinent labs from the last 24 hours have been reviewed.    Significant Diagnostics:  CT: CT Head Without Contrast    Result Date: 12/14/2023  Resolution of the previously seen subarachnoid hemorrhage adjacent to the katiuska.  No new hemorrhage identified.    I have personally reviewed the above referenced imaging.  Assessment/Plan:     * Nontraumatic subarachnoid hemorrhage  59 yo female with history of HTN who presents with nontraumatic prepontine subarachnoid hemorrhage, HH2F2    -Downgraded to NPU with q4 neuro checks  -Imaging and Diagnostics reviewed  -CTA shows stable subarachnoid hemorrhage.  No hydrocephalus. No intracranial aneurysm or AVM is identified.  -DSA normal  -Please obtain repeat CTA head 12/15, PBD 7  -SBP<140  -HOB @30  -Keppra 500mg BID x 7 days  -TCDs daily  x 14d  -Nimotop 60q4h x 21d  -Eunatremic  -Continue to follow clinically and notify NSGY with any decline in neuro status.     Dispo: ongoing    Discussed with Dr. Hira Tanner PA-C  Neurosurgery  [facility] - Neurosurgery ([facility])  "

## 2023-12-14 NOTE — PLAN OF CARE
"  [facility] Care Plan    POC reviewed with Laura Newell and family at 1400. Pt verbalized understanding. Questions and concerns addressed. No acute events today. Pt progressing toward goals. Will continue to monitor. See below and flowsheets for full assessment and VS info.     PRN Tylenol x1 utilized for headache  Transfer orders in        Is this a stroke patient? yes- Stroke booklet reviewed with patient, risk factors identified for patient and stroke booklet remains at bedside for ongoing education.     Neuro:  Balbir Coma Scale  Best Eye Response: 4-->(E4) spontaneous  Best Motor Response: 6-->(M6) obeys commands  Best Verbal Response: 5-->(V5) oriented  [place] Coma Scale Score: 15  Assessment Qualifiers: patient not sedated/intubated  Pupil PERRLA: yes     24 hr Temp:  [98 °F (36.7 °C)-98.3 °F (36.8 °C)]     CV:   Rhythm: normal sinus rhythm  BP goals:   SBP < 140  MAP > 65    Resp:           Plan: N/A    GI/:     Diet/Nutrition Received: regular  Last Bowel Movement: 12/12/23  Voiding Characteristics: voids spontaneously without difficulty    Intake/Output Summary (Last 24 hours) at 12/13/2023 1831  Last data filed at 12/13/2023 1702  Gross per 24 hour   Intake 1700 ml   Output 2250 ml   Net -550 ml     Unmeasured Output  Stool Occurrence: 1    Labs/Accuchecks:  Recent Labs   Lab 12/13/23  0308   WBC 4.28   RBC 4.04   HGB 12.3   HCT 36.0*         Recent Labs   Lab 12/13/23  0308      K 4.0   CO2 23      BUN 15   CREATININE 1.0   ALKPHOS 78   ALT 22   AST 18   BILITOT 0.4      Recent Labs   Lab 12/08/23  1214   INR 1.0   APTT 27.0    No results for input(s): "CPK", "CPKMB", "TROPONINI", "MB" in the last 168 hours.    Electrolytes: N/A - electrolytes WDL  Accuchecks: none    Gtts:      LDA/Wounds:  Lines/Drains/Airways       Peripheral Intravenous Line  Duration                  Peripheral IV - Single Lumen 12/08/23 2037 18 G Right Antecubital 4 days         Peripheral IV - Single Lumen " 12/12/23 0239 20 G Left Antecubital 1 day                  Wounds: No  Wound care consulted: No       Problem: Adult Inpatient Plan of Care  Goal: Plan of Care Review  Outcome: Ongoing, Progressing  Goal: Patient-Specific Goal (Individualized)  Outcome: Ongoing, Progressing  Goal: Readiness for Transition of Care  Outcome: Ongoing, Progressing     Problem: Adjustment to Illness (Stroke, Hemorrhagic)  Goal: Optimal Coping  Outcome: Ongoing, Progressing     Problem: Pain (Stroke, Hemorrhagic)  Goal: Acceptable Pain Control  Outcome: Ongoing, Progressing     Problem: Sensorimotor Impairment (Stroke, Hemorrhagic)  Goal: Improved Sensorimotor Function  Outcome: Ongoing, Progressing

## 2023-12-14 NOTE — PLAN OF CARE
[facility] - Neurosurgery (Hospital)  Discharge Reassessment    Primary Care Provider: Frankie Suh MD    Expected Discharge Date: 12/15/2023    Reassessment (most recent)       Discharge Reassessment - 12/14/23 1416          Discharge Reassessment    Assessment Type Discharge Planning Reassessment (P)      Did the patient's condition or plan change since previous assessment? No (P)      Discharge Plan discussed with: Patient (P)      Communicated FABIOLA with patient/caregiver Yes (P)      Discharge Plan A Home with family (P)      DME Needed Upon Discharge  none (P)      Transition of Care Barriers None (P)         Post-Acute Status    Post-Acute Authorization Other (P)      Other Status No Post-Acute Service Needs (P)                    Patient not medically ready for discharge.  Pending CT today and CTA tomorrow.  Likely discharge 12/15.  Home with family, no anticipated post acute needs.      Discharge Plan A and Plan B have been determined by review of patient's clinical status, future medical and therapeutic needs, and coverage/benefits for post-acute care in coordination with multidisciplinary team members.    Kelsey Wesley LMSW  [facility]  [phone]

## 2023-12-15 PROBLEM — G93.6 VASOGENIC CEREBRAL EDEMA: Status: RESOLVED | Noted: 2023-12-14 | Resolved: 2023-12-15

## 2023-12-15 LAB
ALBUMIN SERPL BCP-MCNC: 3.4 G/DL (ref 3.5–5.2)
ALP SERPL-CCNC: 87 U/L (ref 55–135)
ALT SERPL W/O P-5'-P-CCNC: 24 U/L (ref 10–44)
ANION GAP SERPL CALC-SCNC: 7 MMOL/L (ref 8–16)
AST SERPL-CCNC: 22 U/L (ref 10–40)
BASOPHILS # BLD AUTO: 0.06 K/UL (ref 0–0.2)
BASOPHILS NFR BLD: 1.4 % (ref 0–1.9)
BILIRUB SERPL-MCNC: 0.2 MG/DL (ref 0.1–1)
BUN SERPL-MCNC: 14 MG/DL (ref 6–20)
CALCIUM SERPL-MCNC: 9.3 MG/DL (ref 8.7–10.5)
CHLORIDE SERPL-SCNC: 105 MMOL/L (ref 95–110)
CO2 SERPL-SCNC: 26 MMOL/L (ref 23–29)
CREAT SERPL-MCNC: 0.9 MG/DL (ref 0.5–1.4)
DIFFERENTIAL METHOD: ABNORMAL
EOSINOPHIL # BLD AUTO: 0.3 K/UL (ref 0–0.5)
EOSINOPHIL NFR BLD: 7.5 % (ref 0–8)
ERYTHROCYTE [DISTWIDTH] IN BLOOD BY AUTOMATED COUNT: 12.6 % (ref 11.5–14.5)
EST. GFR  (NO RACE VARIABLE): >60 ML/MIN/1.73 M^2
GLUCOSE SERPL-MCNC: 111 MG/DL (ref 70–110)
HCT VFR BLD AUTO: 38.8 % (ref 37–48.5)
HGB BLD-MCNC: 13 G/DL (ref 12–16)
IMM GRANULOCYTES # BLD AUTO: 0 K/UL (ref 0–0.04)
IMM GRANULOCYTES NFR BLD AUTO: 0 % (ref 0–0.5)
LYMPHOCYTES # BLD AUTO: 1.9 K/UL (ref 1–4.8)
LYMPHOCYTES NFR BLD: 44.3 % (ref 18–48)
MAGNESIUM SERPL-MCNC: 2.8 MG/DL (ref 1.6–2.6)
MCH RBC QN AUTO: 30.5 PG (ref 27–31)
MCHC RBC AUTO-ENTMCNC: 33.5 G/DL (ref 32–36)
MCV RBC AUTO: 91 FL (ref 82–98)
MONOCYTES # BLD AUTO: 0.4 K/UL (ref 0.3–1)
MONOCYTES NFR BLD: 9.6 % (ref 4–15)
NEUTROPHILS # BLD AUTO: 1.6 K/UL (ref 1.8–7.7)
NEUTROPHILS NFR BLD: 37.2 % (ref 38–73)
NRBC BLD-RTO: 0 /100 WBC
PHOSPHATE SERPL-MCNC: 4.7 MG/DL (ref 2.7–4.5)
PLATELET # BLD AUTO: 270 K/UL (ref 150–450)
PMV BLD AUTO: 9.2 FL (ref 9.2–12.9)
POTASSIUM SERPL-SCNC: 4.3 MMOL/L (ref 3.5–5.1)
POTASSIUM SERPL-SCNC: 5.3 MMOL/L (ref 3.5–5.1)
PROT SERPL-MCNC: 6.6 G/DL (ref 6–8.4)
RBC # BLD AUTO: 4.26 M/UL (ref 4–5.4)
SODIUM SERPL-SCNC: 138 MMOL/L (ref 136–145)
WBC # BLD AUTO: 4.27 K/UL (ref 3.9–12.7)

## 2023-12-15 PROCEDURE — 25000003 PHARM REV CODE 250

## 2023-12-15 PROCEDURE — 36415 COLL VENOUS BLD VENIPUNCTURE: CPT

## 2023-12-15 PROCEDURE — 99232 SBSQ HOSP IP/OBS MODERATE 35: CPT | Mod: ,,, | Performed by: PHYSICIAN ASSISTANT

## 2023-12-15 PROCEDURE — 99232 PR SUBSEQUENT HOSPITAL CARE,LEVL II: ICD-10-PCS | Mod: ,,, | Performed by: PHYSICIAN ASSISTANT

## 2023-12-15 PROCEDURE — 63600175 PHARM REV CODE 636 W HCPCS: Performed by: NURSE PRACTITIONER

## 2023-12-15 PROCEDURE — 25000003 PHARM REV CODE 250: Performed by: PHYSICIAN ASSISTANT

## 2023-12-15 PROCEDURE — 25500020 PHARM REV CODE 255: Performed by: PSYCHIATRY & NEUROLOGY

## 2023-12-15 PROCEDURE — 11000001 HC ACUTE MED/SURG PRIVATE ROOM

## 2023-12-15 PROCEDURE — 84132 ASSAY OF SERUM POTASSIUM: CPT

## 2023-12-15 PROCEDURE — 99233 PR SUBSEQUENT HOSPITAL CARE,LEVL III: ICD-10-PCS | Mod: ,,, | Performed by: PSYCHIATRY & NEUROLOGY

## 2023-12-15 PROCEDURE — 83735 ASSAY OF MAGNESIUM: CPT

## 2023-12-15 PROCEDURE — 84100 ASSAY OF PHOSPHORUS: CPT

## 2023-12-15 PROCEDURE — 85025 COMPLETE CBC W/AUTO DIFF WBC: CPT

## 2023-12-15 PROCEDURE — 80053 COMPREHEN METABOLIC PANEL: CPT

## 2023-12-15 PROCEDURE — 63600175 PHARM REV CODE 636 W HCPCS

## 2023-12-15 PROCEDURE — 99233 SBSQ HOSP IP/OBS HIGH 50: CPT | Mod: ,,, | Performed by: PSYCHIATRY & NEUROLOGY

## 2023-12-15 RX ORDER — GABAPENTIN 400 MG/1
400 CAPSULE ORAL 3 TIMES DAILY
Status: DISCONTINUED | OUTPATIENT
Start: 2023-12-15 | End: 2023-12-17 | Stop reason: HOSPADM

## 2023-12-15 RX ORDER — METHOCARBAMOL 750 MG/1
750 TABLET, FILM COATED ORAL 4 TIMES DAILY
Status: DISCONTINUED | OUTPATIENT
Start: 2023-12-15 | End: 2023-12-17 | Stop reason: HOSPADM

## 2023-12-15 RX ADMIN — GABAPENTIN 400 MG: 400 CAPSULE ORAL at 08:12

## 2023-12-15 RX ADMIN — METHOCARBAMOL 750 MG: 750 TABLET ORAL at 08:12

## 2023-12-15 RX ADMIN — ACETAMINOPHEN 1000 MG: 500 TABLET ORAL at 01:12

## 2023-12-15 RX ADMIN — METOCLOPRAMIDE 10 MG: 5 INJECTION, SOLUTION INTRAMUSCULAR; INTRAVENOUS at 05:12

## 2023-12-15 RX ADMIN — IOHEXOL 75 ML: 350 INJECTION, SOLUTION INTRAVENOUS at 05:12

## 2023-12-15 RX ADMIN — AMLODIPINE BESYLATE 10 MG: 10 TABLET ORAL at 10:12

## 2023-12-15 RX ADMIN — OXYCODONE HYDROCHLORIDE 5 MG: 5 TABLET ORAL at 05:12

## 2023-12-15 RX ADMIN — METHOCARBAMOL 750 MG: 750 TABLET ORAL at 06:12

## 2023-12-15 RX ADMIN — GABAPENTIN 400 MG: 400 CAPSULE ORAL at 03:12

## 2023-12-15 RX ADMIN — OXYCODONE HYDROCHLORIDE 5 MG: 5 TABLET ORAL at 03:12

## 2023-12-15 RX ADMIN — METHOCARBAMOL 750 MG: 750 TABLET ORAL at 10:12

## 2023-12-15 RX ADMIN — OXYCODONE HYDROCHLORIDE 5 MG: 5 TABLET ORAL at 01:12

## 2023-12-15 RX ADMIN — ENOXAPARIN SODIUM 40 MG: 40 INJECTION SUBCUTANEOUS at 06:12

## 2023-12-15 RX ADMIN — GABAPENTIN 400 MG: 400 CAPSULE ORAL at 10:12

## 2023-12-15 RX ADMIN — ACETAMINOPHEN 1000 MG: 500 TABLET ORAL at 05:12

## 2023-12-15 RX ADMIN — METHOCARBAMOL 750 MG: 750 TABLET ORAL at 01:12

## 2023-12-15 NOTE — NURSING
Nurses Note -- 4 Eyes      12/15/2023   3:02 PM      Skin assessed during: Daily Assessment      [x] No Altered Skin Integrity Present    []Prevention Measures Documented      [] Yes- Altered Skin Integrity Present or Discovered   [] LDA Added if Not in Epic (Describe Wound)   [] New Altered Skin Integrity was Present on Admit and Documented in LDA   [] Wound Image Taken    Wound Care Consulted? No    Attending Nurse:  Greg Roberson RN/Staff Member:  Brissa

## 2023-12-15 NOTE — PROGRESS NOTES
[facility] - Neurosurgery ([facility])  Neurosurgery  Progress Note    Subjective:     History of Present Illness: 59 yo female with history of HTN transferred from [facility] for NSGY evaluation of subarachnoid hemorrhage. She presented to the emergency room with complaint of acute onset headache while working out this am. States that she took Excedrin migraine which helped a little bit.  States that she has never had a headache like this before.  Patient's systolic blood pressures greater than 200 upon arrival.  CTH at OSH shows small hemorrhage along the anterior and right lateral aspects of the katiuska, no IVH. Pt is not on blood thinners.      Post-Op Info:  Procedure(s) (LRB):  ANGIOGRAM-CEREBRAL; Need Anesthesia; Dr. Byron Iniguez (N/A)   5 Days Post-Op   Interval History: PBD 7. NAEON. AFVSS. Pt reports bad HA last night but much improved this am after Reglan and other meds. No other complaints. Awaiting repeat CTA today.     Medications:  Continuous Infusions:  Scheduled Meds:   acetaminophen  1,000 mg Oral Q8H    amLODIPine  10 mg Oral Daily    enoxparin  40 mg Subcutaneous Q24H (prophylaxis, 1700)    gabapentin  400 mg Oral TID    methocarbamoL  750 mg Oral QID     PRN Meds:labetalol, melatonin, metoclopramide, ondansetron, oxyCODONE     Review of Systems  Objective:     Weight: 64.9 kg (143 lb 1.3 oz)  Body mass index is 24.56 kg/m².  Vital Signs (Most Recent):  Temp: 98 °F (36.7 °C) (12/15/23 1112)  Pulse: 69 (12/15/23 1112)  Resp: 16 (12/15/23 1112)  BP: (!) 161/86 (12/15/23 1112)  SpO2: 99 % (12/15/23 1112) Vital Signs (24h Range):  Temp:  [97.2 °F (36.2 °C)-98.8 °F (37.1 °C)] 98 °F (36.7 °C)  Pulse:  [58-86] 69  Resp:  [16-24] 16  SpO2:  [96 %-100 %] 99 %  BP: (120-195)/(62-92) 161/86                                 Physical Exam         Neurosurgery Physical Exam    General: well developed, well nourished, no distress.   Head: normocephalic, atraumatic  Mental Status: Awake, Alert, Oriented  Speech:  "Clear with content appropriate to conversation  Cranial nerves: CN II-XII grossly intact.   Sensory: intact to light touch throughout     Motor Strength:Moves all extremities spontaneously with good tone.  Full strength upper and lower extremities. No abnormal movements seen.      Pronator Drift: no drift noted  Finger-to-nose: Intact bilaterally    Significant Labs:  Recent Labs   Lab 12/14/23  0406 12/15/23  0304 12/15/23  0652    111*  --     138  --    K 3.7 5.3* 4.3    105  --    CO2 22* 26  --    BUN 10 14  --    CREATININE 0.9 0.9  --    CALCIUM 8.8 9.3  --    MG 1.9 2.8*  --      Recent Labs   Lab 12/14/23  0406 12/15/23  0304   WBC 3.55* 4.27   HGB 12.8 13.0   HCT 35.6* 38.8    270     No results for input(s): "LABPT", "INR", "APTT" in the last 48 hours.  Microbiology Results (last 7 days)       ** No results found for the last 168 hours. **          All pertinent labs from the last 24 hours have been reviewed.    Significant Diagnostics:  I have reviewed and interpreted all pertinent imaging results/findings within the past 24 hours.  Assessment/Plan:     * Nontraumatic subarachnoid hemorrhage  59 yo female with history of HTN who presents with nontraumatic prepontine subarachnoid hemorrhage, HH2F2    -Downgraded to NPU under VN service with q4 neuro checks  -Imaging and Diagnostics reviewed  -CTA 12/8 shows stable subarachnoid hemorrhage.  No hydrocephalus. No intracranial aneurysm or AVM is identified.  -DSA 12/11 normal  -Please obtain repeat CTA head 12/15, PBD 7 - pending; will review once completed   - If stable and negative for any abnormal vascular findings, will plan for follow up outpatient in 4-6 weeks with repeat CTA  -SBP<140  -HOB @30  -Keppra discontinued, no further need for seizure PPx  -TCDs and Nimotop discontinued  -Eunatremic  -Continue to follow clinically and notify NSGY with any decline in neuro status.     Dispo: ongoing    Discussed with " Hira Hawkins PA-C  Neurosurgery  [facility] - Neurosurgery ([facility])

## 2023-12-15 NOTE — PLAN OF CARE
Problem: Bowel Elimination Impaired (Stroke, Hemorrhagic)  Goal: Effective Bowel Elimination  12/15/2023 0602 by Brissa Luna RN  Outcome: Met  12/15/2023 0601 by Brissa Luna RN  Outcome: Ongoing, Progressing     Problem: Adult Inpatient Plan of Care  Goal: Plan of Care Review  12/15/2023 0602 by Brissa Luna RN  Outcome: Ongoing, Progressing  12/15/2023 0601 by Brissa Luna RN  Outcome: Ongoing, Progressing  Goal: Patient-Specific Goal (Individualized)  12/15/2023 0602 by Brissa Luna RN  Outcome: Ongoing, Progressing  12/15/2023 0601 by Brisas Luna RN  Outcome: Ongoing, Progressing  Goal: Absence of Hospital-Acquired Illness or Injury  12/15/2023 0602 by Brissa Luna RN  Outcome: Ongoing, Progressing  12/15/2023 0601 by Brissa Luna RN  Outcome: Ongoing, Progressing  Goal: Optimal Comfort and Wellbeing  12/15/2023 0602 by Brissa Luna RN  Outcome: Ongoing, Progressing  12/15/2023 0601 by Brissa Luna RN  Outcome: Ongoing, Progressing  Goal: Readiness for Transition of Care  12/15/2023 0602 by Brissa Luna RN  Outcome: Ongoing, Progressing  12/15/2023 0601 by Brissa Luna RN  Outcome: Ongoing, Progressing     Problem: Functional Ability Impaired (Stroke, Hemorrhagic)  Goal: Optimal Functional Ability  12/15/2023 0602 by Brissa Luna RN  Outcome: Ongoing, Progressing  12/15/2023 0601 by Brissa Luna RN  Outcome: Ongoing, Progressing     Problem: Pain (Stroke, Hemorrhagic)  Goal: Acceptable Pain Control  12/15/2023 0602 by Brissa Luna RN  Outcome: Ongoing, Progressing  12/15/2023 0601 by Brissa Luna RN  Outcome: Ongoing, Progressing

## 2023-12-15 NOTE — PLAN OF CARE
Problem: Adult Inpatient Plan of Care  Goal: Plan of Care Review  Outcome: Ongoing, Progressing     Problem: Adjustment to Illness (Stroke, Hemorrhagic)  Goal: Optimal Coping  Outcome: Ongoing, Progressing     Problem: Cerebral Tissue Perfusion (Stroke, Hemorrhagic)  Goal: Optimal Cerebral Tissue Perfusion  Outcome: Ongoing, Progressing     Problem: Pain (Stroke, Hemorrhagic)  Goal: Acceptable Pain Control  Outcome: Ongoing, Progressing     Problem: Sensorimotor Impairment (Stroke, Hemorrhagic)  Goal: Improved Sensorimotor Function  Outcome: Ongoing, Progressing

## 2023-12-15 NOTE — PROGRESS NOTES
"[facility] - Neurosurgery ([facility])  Adult Nutrition  Progress Note    SUMMARY       Recommendations    1. Continue Regular Diet.   2. RD to monitor and follow-up.    Goals: Meet % EEN, EPN by RD f/u date  Nutrition Goal Status: goal met  Communication of RD Recs: reviewed with RN    Assessment and Plan    Nutrition Problem:  Inadequate energy intake    Related to (etiology):   Inability to consume sufficient energy     Signs and Symptoms (as evidenced by):   NPO    Interventions(treatment strategy):  Collaboration of nutrition care w/ other providers  ADAT    Nutrition Diagnosis Status:   Resolved           Reason for Assessment    Reason For Assessment: consult  Diagnosis: other (see comments) (SAH)  Relevant Medical History: HTN  Interdisciplinary Rounds: did not attend  General Information Comments: RD follow-up. Pt tolerating regualr diet, PO intake varies % at meals. No c/o N/V/D/C, denies difficulty chewing/swallowing. Appetite is good.  Nutrition Discharge Planning: Adequate PO intake    Nutrition Risk Screen    Nutrition Risk Screen: no indicators present    Nutrition/Diet History    Patient Reported Diet/Restrictions/Preferences: general  Spiritual, Cultural Beliefs, Rastafari Practices, Values that Affect Care: no  Food Allergies: NKFA  Factors Affecting Nutritional Intake: None identified at this time    Anthropometrics    Temp: 98 °F (36.7 °C)  Height Method: Stated  Height: 5' 4" (162.6 cm)  Height (inches): 64 in  Weight Method: Bed Scale  Weight: 64.9 kg (143 lb 1.3 oz)  Weight (lb): 143.08 lb  Ideal Body Weight (IBW), Female: 120 lb  % Ideal Body Weight, Female (lb): 119.17 %  BMI (Calculated): 24.5  BMI Grade: 18.5-24.9 - normal       Lab/Procedures/Meds    Pertinent Labs Reviewed: reviewed  Pertinent Labs Comments: Glu 111, p, 4.7, Mg 2.8  Pertinent Medications Reviewed: reviewed  Pertinent Medications Comments: amlodipine, gabapentin, enoxaparin, tylenol, " methocarbamol      Estimated/Assessed Needs    Weight Used For Calorie Calculations: 65.1 kg (143 lb 8.3 oz)  Energy Calorie Requirements (kcal): 1508 kcal/d  Energy Need Method: [place]-St Jeor (1.25 PAL)  Protein Requirements: 65 g/d (1 g/kg)  Weight Used For Protein Calculations: 65.1 kg (143 lb 8.3 oz)     Estimated Fluid Requirement Method: other (see comments) (Per MD or 1 mL/kcal)  RDA Method (mL): 1508         Nutrition Prescription Ordered    Current Diet Order: Regular    Evaluation of Received Nutrient/Fluid Intake    I/O: +5.95 L  Comments: LBM: 12/8  % Intake of Estimated Energy Needs: 75 - 100 %  % Meal Intake: 50 - 75 %    Nutrition Risk    Level of Risk/Frequency of Follow-up:  (1x/week)     Monitor and Evaluation    Food and Nutrient Intake: food and beverage intake, energy intake  Food and Nutrient Adminstration: diet order  Physical Activity and Function: nutrition-related ADLs and IADLs  Anthropometric Measurements: weight change  Biochemical Data, Medical Tests and Procedures: glucose/endocrine profile, lipid profile, inflammatory profile, gastrointestinal profile, electrolyte and renal panel  Nutrition-Focused Physical Findings: overall appearance     Nutrition Follow-Up    RD Follow-up?: Yes    Philomena Williamson Registration Eligible, Provisional LDN

## 2023-12-15 NOTE — NURSING
Upon arrival to shift around 1910, patient was in fetal position sitting in bed crying and in 9/10 pain. Nurse administered oxy 5 and notified on call stroke provider, bp was elevated to 190's/90's (administered PRN labetalol). Obtained new orders to include IV reglan, IV magnesium and scheduled tylenol. Administered all of these and around 0000 pain was down to 3. @0119 administered PO oxy 5 and let pt know I would check back. Checked back every hour and pain was well controlled until around 0500 when patient stated pain had increased to a 6 and was having pressure along temporal area to back of head. Nurse administered PRN oxy 5 and IV reglan along with 1g of scheduled tylenol. Notified on call stroke provider of changes and patient concern of being discharged without headaches being well controlled. Provider will notify day team. Please see orders for updated meds.

## 2023-12-15 NOTE — SUBJECTIVE & OBJECTIVE
"Interval History: PBD 7. NAEON. AFVSS. Pt reports bad HA last night but much improved this am after Reglan and other meds. No other complaints. Awaiting repeat CTA today.     Medications:  Continuous Infusions:  Scheduled Meds:   acetaminophen  1,000 mg Oral Q8H    amLODIPine  10 mg Oral Daily    enoxparin  40 mg Subcutaneous Q24H (prophylaxis, 1700)    gabapentin  400 mg Oral TID    methocarbamoL  750 mg Oral QID     PRN Meds:labetalol, melatonin, metoclopramide, ondansetron, oxyCODONE     Review of Systems  Objective:     Weight: 64.9 kg (143 lb 1.3 oz)  Body mass index is 24.56 kg/m².  Vital Signs (Most Recent):  Temp: 98 °F (36.7 °C) (12/15/23 1112)  Pulse: 69 (12/15/23 1112)  Resp: 16 (12/15/23 1112)  BP: (!) 161/86 (12/15/23 1112)  SpO2: 99 % (12/15/23 1112) Vital Signs (24h Range):  Temp:  [97.2 °F (36.2 °C)-98.8 °F (37.1 °C)] 98 °F (36.7 °C)  Pulse:  [58-86] 69  Resp:  [16-24] 16  SpO2:  [96 %-100 %] 99 %  BP: (120-195)/(62-92) 161/86                                 Physical Exam         Neurosurgery Physical Exam    General: well developed, well nourished, no distress.   Head: normocephalic, atraumatic  Mental Status: Awake, Alert, Oriented  Speech: Clear with content appropriate to conversation  Cranial nerves: CN II-XII grossly intact.   Sensory: intact to light touch throughout     Motor Strength:Moves all extremities spontaneously with good tone.  Full strength upper and lower extremities. No abnormal movements seen.      Pronator Drift: no drift noted  Finger-to-nose: Intact bilaterally    Significant Labs:  Recent Labs   Lab 12/14/23  0406 12/15/23  0304 12/15/23  0652    111*  --     138  --    K 3.7 5.3* 4.3    105  --    CO2 22* 26  --    BUN 10 14  --    CREATININE 0.9 0.9  --    CALCIUM 8.8 9.3  --    MG 1.9 2.8*  --      Recent Labs   Lab 12/14/23  0406 12/15/23  0304   WBC 3.55* 4.27   HGB 12.8 13.0   HCT 35.6* 38.8    270     No results for input(s): "LABPT", "INR", " ""APTT" in the last 48 hours.  Microbiology Results (last 7 days)       ** No results found for the last 168 hours. **          All pertinent labs from the last 24 hours have been reviewed.    Significant Diagnostics:  I have reviewed and interpreted all pertinent imaging results/findings within the past 24 hours.  "

## 2023-12-15 NOTE — PLAN OF CARE
Recommendations    1. Continue Regular Diet.   2. RD to monitor and follow-up.    Goals: Meet % EEN, EPN by RD f/u date  Nutrition Goal Status: goal met  Communication of RD Recs: reviewed with RN

## 2023-12-16 LAB
ALBUMIN SERPL BCP-MCNC: 3.4 G/DL (ref 3.5–5.2)
ALP SERPL-CCNC: 82 U/L (ref 55–135)
ALT SERPL W/O P-5'-P-CCNC: 41 U/L (ref 10–44)
ANION GAP SERPL CALC-SCNC: 5 MMOL/L (ref 8–16)
AST SERPL-CCNC: 40 U/L (ref 10–40)
BASOPHILS # BLD AUTO: 0.07 K/UL (ref 0–0.2)
BASOPHILS NFR BLD: 1.7 % (ref 0–1.9)
BILIRUB SERPL-MCNC: 0.3 MG/DL (ref 0.1–1)
BUN SERPL-MCNC: 13 MG/DL (ref 6–20)
CALCIUM SERPL-MCNC: 9.2 MG/DL (ref 8.7–10.5)
CHLORIDE SERPL-SCNC: 106 MMOL/L (ref 95–110)
CO2 SERPL-SCNC: 25 MMOL/L (ref 23–29)
CREAT SERPL-MCNC: 1 MG/DL (ref 0.5–1.4)
DIFFERENTIAL METHOD: NORMAL
EOSINOPHIL # BLD AUTO: 0.3 K/UL (ref 0–0.5)
EOSINOPHIL NFR BLD: 7.9 % (ref 0–8)
ERYTHROCYTE [DISTWIDTH] IN BLOOD BY AUTOMATED COUNT: 12.5 % (ref 11.5–14.5)
EST. GFR  (NO RACE VARIABLE): >60 ML/MIN/1.73 M^2
GLUCOSE SERPL-MCNC: 103 MG/DL (ref 70–110)
HCT VFR BLD AUTO: 38 % (ref 37–48.5)
HGB BLD-MCNC: 12.7 G/DL (ref 12–16)
IMM GRANULOCYTES # BLD AUTO: 0.01 K/UL (ref 0–0.04)
IMM GRANULOCYTES NFR BLD AUTO: 0.2 % (ref 0–0.5)
LYMPHOCYTES # BLD AUTO: 1.5 K/UL (ref 1–4.8)
LYMPHOCYTES NFR BLD: 34.5 % (ref 18–48)
MAGNESIUM SERPL-MCNC: 2.1 MG/DL (ref 1.6–2.6)
MCH RBC QN AUTO: 30.2 PG (ref 27–31)
MCHC RBC AUTO-ENTMCNC: 33.4 G/DL (ref 32–36)
MCV RBC AUTO: 90 FL (ref 82–98)
MONOCYTES # BLD AUTO: 0.4 K/UL (ref 0.3–1)
MONOCYTES NFR BLD: 10.5 % (ref 4–15)
NEUTROPHILS # BLD AUTO: 1.9 K/UL (ref 1.8–7.7)
NEUTROPHILS NFR BLD: 45.2 % (ref 38–73)
NRBC BLD-RTO: 0 /100 WBC
PHOSPHATE SERPL-MCNC: 4.1 MG/DL (ref 2.7–4.5)
PLATELET # BLD AUTO: 261 K/UL (ref 150–450)
PMV BLD AUTO: 9.2 FL (ref 9.2–12.9)
POTASSIUM SERPL-SCNC: 4.2 MMOL/L (ref 3.5–5.1)
PROT SERPL-MCNC: 6.5 G/DL (ref 6–8.4)
RBC # BLD AUTO: 4.21 M/UL (ref 4–5.4)
SODIUM SERPL-SCNC: 136 MMOL/L (ref 136–145)
WBC # BLD AUTO: 4.2 K/UL (ref 3.9–12.7)

## 2023-12-16 PROCEDURE — 63600175 PHARM REV CODE 636 W HCPCS: Performed by: NURSE PRACTITIONER

## 2023-12-16 PROCEDURE — 11000001 HC ACUTE MED/SURG PRIVATE ROOM

## 2023-12-16 PROCEDURE — 25000003 PHARM REV CODE 250: Performed by: PHYSICIAN ASSISTANT

## 2023-12-16 PROCEDURE — 85025 COMPLETE CBC W/AUTO DIFF WBC: CPT

## 2023-12-16 PROCEDURE — 84100 ASSAY OF PHOSPHORUS: CPT

## 2023-12-16 PROCEDURE — 97535 SELF CARE MNGMENT TRAINING: CPT

## 2023-12-16 PROCEDURE — 99233 SBSQ HOSP IP/OBS HIGH 50: CPT | Mod: ,,, | Performed by: PSYCHIATRY & NEUROLOGY

## 2023-12-16 PROCEDURE — 80053 COMPREHEN METABOLIC PANEL: CPT

## 2023-12-16 PROCEDURE — 25000003 PHARM REV CODE 250

## 2023-12-16 PROCEDURE — 83735 ASSAY OF MAGNESIUM: CPT

## 2023-12-16 PROCEDURE — 99233 PR SUBSEQUENT HOSPITAL CARE,LEVL III: ICD-10-PCS | Mod: ,,, | Performed by: PHYSICIAN ASSISTANT

## 2023-12-16 PROCEDURE — 99233 SBSQ HOSP IP/OBS HIGH 50: CPT | Mod: ,,, | Performed by: PHYSICIAN ASSISTANT

## 2023-12-16 PROCEDURE — 63600175 PHARM REV CODE 636 W HCPCS

## 2023-12-16 PROCEDURE — 99233 PR SUBSEQUENT HOSPITAL CARE,LEVL III: ICD-10-PCS | Mod: ,,, | Performed by: PSYCHIATRY & NEUROLOGY

## 2023-12-16 RX ADMIN — ACETAMINOPHEN 1000 MG: 500 TABLET ORAL at 09:12

## 2023-12-16 RX ADMIN — AMLODIPINE BESYLATE 10 MG: 10 TABLET ORAL at 08:12

## 2023-12-16 RX ADMIN — ACETAMINOPHEN 1000 MG: 500 TABLET ORAL at 05:12

## 2023-12-16 RX ADMIN — ONDANSETRON 8 MG: 8 TABLET, ORALLY DISINTEGRATING ORAL at 02:12

## 2023-12-16 RX ADMIN — ONDANSETRON 8 MG: 8 TABLET, ORALLY DISINTEGRATING ORAL at 09:12

## 2023-12-16 RX ADMIN — GABAPENTIN 400 MG: 400 CAPSULE ORAL at 09:12

## 2023-12-16 RX ADMIN — METHOCARBAMOL 750 MG: 750 TABLET ORAL at 01:12

## 2023-12-16 RX ADMIN — GABAPENTIN 400 MG: 400 CAPSULE ORAL at 03:12

## 2023-12-16 RX ADMIN — GABAPENTIN 400 MG: 400 CAPSULE ORAL at 08:12

## 2023-12-16 RX ADMIN — ACETAMINOPHEN 1000 MG: 500 TABLET ORAL at 01:12

## 2023-12-16 RX ADMIN — OXYCODONE HYDROCHLORIDE 5 MG: 5 TABLET ORAL at 08:12

## 2023-12-16 RX ADMIN — METHOCARBAMOL 750 MG: 750 TABLET ORAL at 08:12

## 2023-12-16 RX ADMIN — METOCLOPRAMIDE 10 MG: 5 INJECTION, SOLUTION INTRAMUSCULAR; INTRAVENOUS at 11:12

## 2023-12-16 RX ADMIN — ENOXAPARIN SODIUM 40 MG: 40 INJECTION SUBCUTANEOUS at 05:12

## 2023-12-16 RX ADMIN — METHOCARBAMOL 750 MG: 750 TABLET ORAL at 05:12

## 2023-12-16 NOTE — PT/OT/SLP PROGRESS
"Occupational Therapy   Treatment    Name: Laura Newell  MRN: [id number]  Admitting Diagnosis:  Nontraumatic subarachnoid hemorrhage  6 Days Post-Op    Recommendations:     Discharge Recommendations: No Therapy Indicated  Discharge Equipment Recommendations:  none  Barriers to discharge:  None    Assessment:     Laura Newell is a 58 y.o. female with a medical diagnosis of Nontraumatic subarachnoid hemorrhage.  She presents with performance deficits affecting function are impaired functional mobility, impaired self care skills.     Rehab Prognosis:  Good; patient would benefit from acute skilled OT services to address these deficits and reach maximum level of function.       Plan:     Patient to be seen 2 x/week to address the above listed problems via neuromuscular re-education, therapeutic exercises, self-care/home management  Plan of Care Expires: 01/06/24  Plan of Care Reviewed with: patient    Subjective     Patient:  "Thank you for going over that with me.  I get so much pain when I bend my head down."  Pain/Comfort:  Pain Rating 1: 0/10  Pain Rating Post-Intervention 1: 0/10    Objective:     Communicated with: Nurse prior to session.  Patient found supine with telemetry upon OT entry to room.    General Precautions: Standard, fall    Orthopedic Precautions:N/A  Braces: N/A  Respiratory Status: Room air     Occupational Performance:     Bed Mobility:    Patient completed Rolling/Turning to Left with  modified independence  Patient completed Rolling/Turning to Right with modified independence  Patient completed Supine to Sit with modified independence  Patient completed Sit to Supine with modified independence     Functional Mobility/Transfers:  Patient completed Sit <> Stand Transfer with supervision  with  no assistive device   Patient completed Bed <> Chair Transfer using Stand Pivot technique with supervision with no assistive device    Activities of Daily Living:  Grooming: supervision    Upper Body Dressing: " supervision    Lower Body Dressing: supervision    Toileting: supervision      [facility] 6 Click ADL: 19    Treatment & Education:  Patient alert and oriented x 3; able to follow 4/4 one step commands.  Patient attentive and interactive throughout the session.      Patient left supine with all lines intact, call button in reach, and bed alarm on    GOALS:   Multidisciplinary Problems       Occupational Therapy Goals          Problem: Occupational Therapy    Goal Priority Disciplines Outcome Interventions   Occupational Therapy Goal     OT, PT/OT Ongoing, Progressing    Description: Goals set 12/9 to be addressed for 14 days with expiration date, 12/23:  Patient will increase functional independence with ADLs by performing:  Patient will demonstrate stand pivot transfers with independence.   Not met  Patient will demonstrate grooming while standing with independence.   Not met  Patient will demonstrate upper body dressing with independence while seated EOB.   Not met  Patient will demonstrate lower body dressing with independence while seated EOB.   Not met  Patient will demonstrate toileting with independence.   Not met  Patient will demonstrate bathing while seated EOB with independence.   Not met  Patient's family / caregiver will demonstrate independence and safety with assisting patient with self-care skills and functional mobility.     Not met                               Time Tracking:     OT Date of Treatment: 12/16/23  OT Start Time: 0640  OT Stop Time: 0703  OT Total Time (min): 23 min    Billable Minutes:Self Care/Home Management 23    OT/KAEL: OT          12/16/2023

## 2023-12-16 NOTE — PLAN OF CARE
Problem: Adult Inpatient Plan of Care  Goal: Plan of Care Review  Outcome: Ongoing, Progressing  Goal: Patient-Specific Goal (Individualized)  Outcome: Ongoing, Progressing  Goal: Absence of Hospital-Acquired Illness or Injury  Outcome: Ongoing, Progressing  Goal: Optimal Comfort and Wellbeing  Outcome: Ongoing, Progressing  Goal: Readiness for Transition of Care  Outcome: Ongoing, Progressing     Problem: Adjustment to Illness (Stroke, Hemorrhagic)  Goal: Optimal Coping  Outcome: Ongoing, Progressing     Problem: Cerebral Tissue Perfusion (Stroke, Hemorrhagic)  Goal: Optimal Cerebral Tissue Perfusion  Outcome: Ongoing, Progressing     Problem: Cognitive Impairment (Stroke, Hemorrhagic)  Goal: Optimal Cognitive Function  Outcome: Ongoing, Progressing     Problem: Functional Ability Impaired (Stroke, Hemorrhagic)  Goal: Optimal Functional Ability  Outcome: Ongoing, Progressing     Problem: Pain (Stroke, Hemorrhagic)  Goal: Acceptable Pain Control  Outcome: Ongoing, Progressing     Problem: Respiratory Compromise (Stroke, Hemorrhagic)  Goal: Effective Oxygenation and Ventilation  Outcome: Ongoing, Progressing     Problem: Sensorimotor Impairment (Stroke, Hemorrhagic)  Goal: Improved Sensorimotor Function  Outcome: Ongoing, Progressing     Problem: Swallowing Impairment (Stroke, Hemorrhagic)  Goal: Optimal Eating and Swallowing Without Aspiration  Outcome: Ongoing, Progressing     Problem: Urinary Elimination Impaired (Stroke, Hemorrhagic)  Goal: Effective Urinary Elimination  Outcome: Ongoing, Progressing

## 2023-12-16 NOTE — PROGRESS NOTES
[facility] - Neurosurgery ([facility])  Vascular Neurology  Comprehensive Stroke Center  Progress Note    Assessment/Plan:     * Nontraumatic subarachnoid hemorrhage  Laura Newell is a 58 y.o. female with a significant medical history of asthma who presents to the hospital as a transfer from [facility] for evaluation of SAH.  She was hypertensive on presentation to the OSH ED, 200s/100s. CT with perimesencephalic SAH. CTA negative for aneurysm or other cerebrovascular abnormalities. TTE EF 65%, no LAE, normal wall thickness noted. DSA unremarkable for vascular abnormalities.     Repeat CTA (day 7) pending.          Antithrombotics for secondary stroke prevention: Antiplatelets: None: Intracerebral Hemorrhage    Statins for secondary stroke prevention and hyperlipidemia, if present:   Statins: None: Reason: SAH    Aggressive risk factor modification: None     Rehab efforts: The patient has been evaluated by a stroke team provider and the therapy needs have been fully considered based off the presenting complaints and exam findings. The following therapy evaluations are needed: PT evaluate and treat, OT evaluate and treat    Diagnostics ordered/pending: Other: Daily TCDs    VTE prophylaxis: Enoxaparin 40 mg SQ every 24 hours  Mechanical prophylaxis: Place SCDs    BP parameters: SAH: SBP<140        Cephalalgia  Stepped down on scheduled gabapentin and robaxin, prn tylenol and oxy  Starting scheduled tylenol 1000 mg TID qh  Will adjust medications further as needed    Elevated cholesterol  -Stroke risk factor.  Noted in the patient's record as of 6/2020.  Patient not currently prescribed statin.  -Lipid panel WNL  -statin not indicated    Essential hypertension  -Stroke risk factor.  Noted in the patient's record as of 8/2019.  Patient not currently prescribed medications.  -Initial SBP 200s.  -SBP <140  -Initially on cardene, currently off at this time  -on amlodipine 5 mg daily, increased to 10 mg  today  -PRN labetalol  -Monitor for need to d/c on home BP meds         12/9/23 patient reports improvement in HA, rates pain 2/10, persistent photophobia, exam nonfocal, CTA negative for aneurysm or other cerebrovascular abnormality, DSA pending, TCD pending  12/12/23: NAEON. Neuro exam stable. Patient continues to have intermittent headaches, neck pain, and nausea. Patient reports relief with zofran. Tylenol was given for headache and neck pain. Daily TCDs in progress, no evidence of vasospasms noted for today's TCD. Patient stable for NPU stepdown.  12/13/23: NAEON. Neuro exam stable. Patient continues to have persistent photophobia and headaches. TCDs stable. Patient pending NPU stepdown.  12/14/23: HA more uncontrolled today with new phonophobia, STAT CTH pending, increasing amlodipine, adjusting HA medication regimen, repeating CTA tomorrow  12/15/23: HA exacerbated overnight, improved this AM.  Reglan, standing tylenol, and increasing GBP helped.  Pending CTA.    STROKE DOCUMENTATION        NIH Scale:  1a. Level of Consciousness: 0-->Alert, keenly responsive  1b. LOC Questions: 0-->Answers both questions correctly  1c. LOC Commands: 0-->Performs both tasks correctly  2. Best Gaze: 0-->Normal  3. Visual: 0-->No visual loss  4. Facial Palsy: 0-->Normal symmetrical movements  5a. Motor Arm, Left: 0-->No drift, limb holds 90 (or 45) degrees for full 10 secs  5b. Motor Arm, Right: 0-->No drift, limb holds 90 (or 45) degrees for full 10 secs  6a. Motor Leg, Left: 0-->No drift, leg holds 30 degree position for full 5 secs  6b. Motor Leg, Right: 0-->No drift, leg holds 30 degree position for full 5 secs  7. Limb Ataxia: 0-->Absent  8. Sensory: 0-->Normal, no sensory loss  9. Best Language: 0-->No aphasia, normal  10. Dysarthria: 0-->Normal  11. Extinction and Inattention (formerly Neglect): 0-->No abnormality  Total (NIH Stroke Scale): 0       Modified [place]den Score: 0  [place] Coma Scale:    ABCD2 Score:     [id number] Score:   HAS -BLED Score:   ICH Score:0  Hunt & Bravo Classification:Grade I     Hemorrhagic change of an Ischemic Stroke: Does this patient have an ischemic stroke with hemorrhagic changes? No     Neurologic Chief Complaint: HA    Subjective:     Interval History: Severe HA overnight.  Improved with reglan.  Currently 2/10    HPI, Past Medical, Family, and Social History remains the same as documented in the initial encounter.     Review of Systems   Neurological:  Positive for headaches.     Scheduled Meds:   acetaminophen  1,000 mg Oral Q8H    amLODIPine  10 mg Oral Daily    enoxparin  40 mg Subcutaneous Q24H (prophylaxis, 1700)    gabapentin  400 mg Oral TID    methocarbamoL  750 mg Oral QID     Continuous Infusions:  PRN Meds:labetalol, melatonin, metoclopramide, ondansetron, oxyCODONE    Objective:     Vital Signs (Most Recent):  Temp: 98.2 °F (36.8 °C) (12/15/23 2000)  Pulse: 70 (12/15/23 2250)  Resp: 18 (12/15/23 2000)  BP: 128/63 (12/15/23 2000)  SpO2: 96 % (12/15/23 2000)  BP Location: Right arm    Vital Signs Range (Last 24H):  Temp:  [97.2 °F (36.2 °C)-98.2 °F (36.8 °C)]   Pulse:  [58-85]   Resp:  [16-20]   BP: (120-161)/(60-86)   SpO2:  [96 %-100 %]   BP Location: Right arm       Physical Exam  Constitutional:       General: She is not in acute distress.  HENT:      Head: Normocephalic and atraumatic.      Mouth/Throat:      Mouth: Mucous membranes are moist.      Pharynx: Oropharynx is clear.   Eyes:      Extraocular Movements: Extraocular movements intact.      Pupils: Pupils are equal, round, and reactive to light.   Cardiovascular:      Rate and Rhythm: Normal rate and regular rhythm.   Pulmonary:      Effort: Pulmonary effort is normal.      Breath sounds: Normal breath sounds.   Musculoskeletal:         General: No swelling. Normal range of motion.   Neurological:      Comments: MS: A&XO3, speech fluent, follows commands, no neglect  CN: PERRL, EOMI, sensation intact, face symmetric,  no dysarthria  Motor: no arm or leg drift  Sens: intact to LT  Coord: no ataxia on finger to nose                      Laboratory:  CMP:   Recent Labs   Lab 12/15/23  0304 12/15/23  0652   CALCIUM 9.3  --    ALBUMIN 3.4*  --    PROT 6.6  --      --    K 5.3* 4.3   CO2 26  --      --    BUN 14  --    CREATININE 0.9  --    ALKPHOS 87  --    ALT 24  --    AST 22  --    BILITOT 0.2  --      BMP:   Recent Labs   Lab 12/15/23  0304 12/15/23  0652     --    K 5.3* 4.3     --    CO2 26  --    BUN 14  --    CREATININE 0.9  --    CALCIUM 9.3  --      CBC:   Recent Labs   Lab 12/15/23  0304   WBC 4.27   RBC 4.26   HGB 13.0   HCT 38.8      MCV 91   MCH 30.5   MCHC 33.5       Diagnostic Results    Repeat CTA pending    Carol Goodwin MD  Comprehensive Stroke Center  Department of Vascular Neurology   [facility] [place])

## 2023-12-16 NOTE — PROGRESS NOTES
[facility] Neurosurgery ([facility])  Vascular Neurology  Comprehensive Stroke Center  Progress Note    Assessment/Plan:     * Nontraumatic subarachnoid hemorrhage  Laura Newell is a 58 y.o. female with a significant medical history of asthma who presents to the hospital as a transfer from [facility] for evaluation of SAH.  She was hypertensive on presentation to the OSH ED, 200s/100s. CT with perimesencephalic SAH. CTA negative for aneurysm or other cerebrovascular abnormalities. TTE EF 65%, no LAE, normal wall thickness noted. DSA unremarkable for vascular abnormalities.     Repeat CTA (day 7) :  with NO  vascular abnormalities. Patient doing good , reported neck pain and spasm but medications helps. Plan for MRI for more details. Initial CTH with questionable hypodensity in the R MCA area.         Antithrombotics for secondary stroke prevention: Antiplatelets: None: Intracerebral Hemorrhage    Statins for secondary stroke prevention and hyperlipidemia, if present:   Statins: None: Reason: SAH    Aggressive risk factor modification: None     Rehab efforts: The patient has been evaluated by a stroke team provider and the therapy needs have been fully considered based off the presenting complaints and exam findings. The following therapy evaluations are needed: PT evaluate and treat, OT evaluate and treat    Diagnostics ordered/pending: Other: Daily TCDs    VTE prophylaxis: Enoxaparin 40 mg SQ every 24 hours  Mechanical prophylaxis: Place SCDs    BP parameters: SAH: SBP<140        Cephalalgia  On scheduled gabapentin and robaxin, prn tylenol and oxy  On scheduled tylenol 1000 mg TID qh  Will adjust medications further as needed    Elevated cholesterol  -Stroke risk factor.  Noted in the patient's record as of 6/2020.  Patient not currently prescribed statin.  -Lipid panel WNL  -statin not indicated    Essential hypertension  -Stroke risk factor.  Noted in the patient's record as of 8/2019.   Patient not currently prescribed medications.  -Initial SBP 200s.  -SBP <140  -Initially on cardene, currently off at this time  -Now on  amlodipine 10 mg daily, increased from 5 mg daily   -PRN labetalol  -Monitor for need to d/c on home BP meds         12/9/23 patient reports improvement in HA, rates pain 2/10, persistent photophobia, exam nonfocal, CTA negative for aneurysm or other cerebrovascular abnormality, DSA pending, TCD pending  12/12/23: NAEON. Neuro exam stable. Patient continues to have intermittent headaches, neck pain, and nausea. Patient reports relief with zofran. Tylenol was given for headache and neck pain. Daily TCDs in progress, no evidence of vasospasms noted for today's TCD. Patient stable for NPU stepdown.  12/13/23: NAEON. Neuro exam stable. Patient continues to have persistent photophobia and headaches. TCDs stable. Patient pending NPU stepdown.  12/14/23: HA more uncontrolled today with new phonophobia, STAT CTH pending, increasing amlodipine, adjusting HA medication regimen, repeating CTA tomorrow  12/15/23: HA exacerbated overnight, improved this AM.  Reglan, standing tylenol, and increasing GBP helped.  Pending CTA.  12/16: patient doing good, neck/head pain controlled with medicaiton regimen. Plan for MRI today.      STROKE DOCUMENTATION        NIH Scale:          Modified [place] Score: 0  [place] Coma Scale:    ABCD2 Score:    [id number] Score:   HAS -BLED Score:   ICH Score:0  Hunt & Bravo Classification:Grade I     Hemorrhagic change of an Ischemic Stroke: Does this patient have an ischemic stroke with hemorrhagic changes? No     No new subjective & objective note has been filed under this hospital service since the last note was generated.      Nanda Ribeiro MD  Comprehensive Stroke Center  Department of Vascular Neurology   [facility]

## 2023-12-16 NOTE — SUBJECTIVE & OBJECTIVE
Neurologic Chief Complaint: HA    Subjective:     Interval History: Severe HA overnight.  Improved with reglan.  Currently 2/10    HPI, Past Medical, Family, and Social History remains the same as documented in the initial encounter.     Review of Systems   Neurological:  Positive for headaches.     Scheduled Meds:   acetaminophen  1,000 mg Oral Q8H    amLODIPine  10 mg Oral Daily    enoxparin  40 mg Subcutaneous Q24H (prophylaxis, 1700)    gabapentin  400 mg Oral TID    methocarbamoL  750 mg Oral QID     Continuous Infusions:  PRN Meds:labetalol, melatonin, metoclopramide, ondansetron, oxyCODONE    Objective:     Vital Signs (Most Recent):  Temp: 98.2 °F (36.8 °C) (12/15/23 2000)  Pulse: 70 (12/15/23 2250)  Resp: 18 (12/15/23 2000)  BP: 128/63 (12/15/23 2000)  SpO2: 96 % (12/15/23 2000)  BP Location: Right arm    Vital Signs Range (Last 24H):  Temp:  [97.2 °F (36.2 °C)-98.2 °F (36.8 °C)]   Pulse:  [58-85]   Resp:  [16-20]   BP: (120-161)/(60-86)   SpO2:  [96 %-100 %]   BP Location: Right arm       Physical Exam  Constitutional:       General: She is not in acute distress.  HENT:      Head: Normocephalic and atraumatic.      Mouth/Throat:      Mouth: Mucous membranes are moist.      Pharynx: Oropharynx is clear.   Eyes:      Extraocular Movements: Extraocular movements intact.      Pupils: Pupils are equal, round, and reactive to light.   Cardiovascular:      Rate and Rhythm: Normal rate and regular rhythm.   Pulmonary:      Effort: Pulmonary effort is normal.      Breath sounds: Normal breath sounds.   Musculoskeletal:         General: No swelling. Normal range of motion.   Neurological:      Comments: MS: A&XO3, speech fluent, follows commands, no neglect  CN: PERRL, EOMI, sensation intact, face symmetric, no dysarthria  Motor: no arm or leg drift  Sens: intact to LT  Coord: no ataxia on finger to nose                      Laboratory:  CMP:   Recent Labs   Lab 12/15/23  0304 12/15/23  0652   CALCIUM 9.3  --     ALBUMIN 3.4*  --    PROT 6.6  --      --    K 5.3* 4.3   CO2 26  --      --    BUN 14  --    CREATININE 0.9  --    ALKPHOS 87  --    ALT 24  --    AST 22  --    BILITOT 0.2  --      BMP:   Recent Labs   Lab 12/15/23  0304 12/15/23  0652     --    K 5.3* 4.3     --    CO2 26  --    BUN 14  --    CREATININE 0.9  --    CALCIUM 9.3  --      CBC:   Recent Labs   Lab 12/15/23  0304   WBC 4.27   RBC 4.26   HGB 13.0   HCT 38.8      MCV 91   MCH 30.5   MCHC 33.5       Diagnostic Results    Repeat CTA pending

## 2023-12-16 NOTE — NURSING
Nurses Note -- 4 Eyes      12/16/2023   9:46 AM      Skin assessed during: Q Shift Change      [x] No Altered Skin Integrity Present    []Prevention Measures Documented      [] Yes- Altered Skin Integrity Present or Discovered   [] LDA Added if Not in Epic (Describe Wound)   [] New Altered Skin Integrity was Present on Admit and Documented in LDA   [] Wound Image Taken    Wound Care Consulted? No    Attending Nurse:  Chin Roberson RN/Staff Member:  Jasvir

## 2023-12-16 NOTE — PROGRESS NOTES
[facility] - Neurosurgery ([facility])  Neurosurgery  Progress Note    Subjective:     History of Present Illness: 57 yo female with history of HTN transferred from [facility] for NSGY evaluation of subarachnoid hemorrhage. She presented to the emergency room with complaint of acute onset headache while working out this am. States that she took Excedrin migraine which helped a little bit.  States that she has never had a headache like this before.  Patient's systolic blood pressures greater than 200 upon arrival.  CTH at OSH shows small hemorrhage along the anterior and right lateral aspects of the katiuska, no IVH. Pt is not on blood thinners.      Post-Op Info:  Procedure(s) (LRB):  ANGIOGRAM-CEREBRAL; Need Anesthesia; Dr. Byron Iniguez (N/A)   6 Days Post-Op   Interval History: PBD 8. CTA with resolution of SAH and otherwise negative. MRI pending. Patient with headache improved with current regimen.     Medications:  Continuous Infusions:  Scheduled Meds:   acetaminophen  1,000 mg Oral Q8H    amLODIPine  10 mg Oral Daily    enoxparin  40 mg Subcutaneous Q24H (prophylaxis, 1700)    gabapentin  400 mg Oral TID    methocarbamoL  750 mg Oral QID     PRN Meds:labetalol, melatonin, metoclopramide, ondansetron, oxyCODONE     Review of Systems  Objective:     Weight: 64.9 kg (143 lb 1.3 oz)  Body mass index is 24.56 kg/m².  Vital Signs (Most Recent):  Temp: 98.1 °F (36.7 °C) (12/16/23 1142)  Pulse: 69 (12/16/23 1142)  Resp: 16 (12/16/23 1142)  BP: 125/72 (12/16/23 1142)  SpO2: 97 % (12/16/23 1142) Vital Signs (24h Range):  Temp:  [97.9 °F (36.6 °C)-98.2 °F (36.8 °C)] 98.1 °F (36.7 °C)  Pulse:  [63-81] 69  Resp:  [16-18] 16  SpO2:  [96 %-99 %] 97 %  BP: (104-156)/(60-82) 125/72     Date 12/16/23 0700 - 12/17/23 0659   Shift 2712-7273 0469-2630 7875-4495 24 Hour Total   INTAKE   P.O. 240   240   Shift Total(mL/kg) 240(3.7)   240(3.7)   OUTPUT   Shift Total(mL/kg)       Weight (kg) 64.9 64.9 64.9 64.9         Neurosurgery  "Physical Exam  General: well developed, well nourished, no distress.   Head: normocephalic, atraumatic  Mental Status: Awake, Alert, Oriented  Speech: Clear with content appropriate to conversation  Cranial nerves: CN II-XII grossly intact.   Sensory: intact to light touch throughout     Motor Strength:Moves all extremities spontaneously with good tone.  Full strength upper and lower extremities. No abnormal movements seen.      Pronator Drift: no drift noted  Finger-to-nose: Intact bilaterally    Significant Labs:  Recent Labs   Lab 12/15/23  0304 12/15/23  0652 12/16/23  0348   *  --  103     --  136   K 5.3* 4.3 4.2     --  106   CO2 26  --  25   BUN 14  --  13   CREATININE 0.9  --  1.0   CALCIUM 9.3  --  9.2   MG 2.8*  --  2.1     Recent Labs   Lab 12/15/23  0304 12/16/23  0348   WBC 4.27 4.20   HGB 13.0 12.7   HCT 38.8 38.0    261     No results for input(s): "LABPT", "INR", "APTT" in the last 48 hours.  Microbiology Results (last 7 days)       ** No results found for the last 168 hours. **          All pertinent labs from the last 24 hours have been reviewed.    Significant Diagnostics:  I have reviewed all pertinent imaging results/findings within the past 24 hours.  Assessment/Plan:     * Nontraumatic subarachnoid hemorrhage  57 yo female with history of HTN who presents with nontraumatic prepontine subarachnoid hemorrhage, HH2F2    -Downgraded to NPU under VN service with q4 neuro checks  -Imaging and Diagnostics reviewed  -CTA 12/8 shows stable subarachnoid hemorrhage.  No hydrocephalus. No intracranial aneurysm or AVM is identified.  -DSA 12/11 normal  -Please obtain repeat CTA head 12/15, PBD 7 - negative   - plan for follow up outpatient in 4-6 weeks with repeat CTA  -SBP<140  -HOB @30  -Keppra discontinued, no further need for seizure PPx  -TCDs and Nimotop discontinued  -Eunatremic  -Continue to follow clinically and notify NSGY with any decline in neuro status.     Dispo: " ongoing; nsgy will sign off         Yesi Carrasquillo PA-C  Neurosurgery  [facility] - Neurosurgery ([facility])

## 2023-12-16 NOTE — SUBJECTIVE & OBJECTIVE
Interval History: PBD 8. CTA with resolution of SAH and otherwise negative. MRI pending. Patient with headache improved with current regimen.     Medications:  Continuous Infusions:  Scheduled Meds:   acetaminophen  1,000 mg Oral Q8H    amLODIPine  10 mg Oral Daily    enoxparin  40 mg Subcutaneous Q24H (prophylaxis, 1700)    gabapentin  400 mg Oral TID    methocarbamoL  750 mg Oral QID     PRN Meds:labetalol, melatonin, metoclopramide, ondansetron, oxyCODONE     Review of Systems  Objective:     Weight: 64.9 kg (143 lb 1.3 oz)  Body mass index is 24.56 kg/m².  Vital Signs (Most Recent):  Temp: 98.1 °F (36.7 °C) (12/16/23 1142)  Pulse: 69 (12/16/23 1142)  Resp: 16 (12/16/23 1142)  BP: 125/72 (12/16/23 1142)  SpO2: 97 % (12/16/23 1142) Vital Signs (24h Range):  Temp:  [97.9 °F (36.6 °C)-98.2 °F (36.8 °C)] 98.1 °F (36.7 °C)  Pulse:  [63-81] 69  Resp:  [16-18] 16  SpO2:  [96 %-99 %] 97 %  BP: (104-156)/(60-82) 125/72     Date 12/16/23 0700 - 12/17/23 0659   Shift 3748-0639 2293-6897 7742-0213 24 Hour Total   INTAKE   P.O. 240   240   Shift Total(mL/kg) 240(3.7)   240(3.7)   OUTPUT   Shift Total(mL/kg)       Weight (kg) 64.9 64.9 64.9 64.9         Neurosurgery Physical Exam  General: well developed, well nourished, no distress.   Head: normocephalic, atraumatic  Mental Status: Awake, Alert, Oriented  Speech: Clear with content appropriate to conversation  Cranial nerves: CN II-XII grossly intact.   Sensory: intact to light touch throughout     Motor Strength:Moves all extremities spontaneously with good tone.  Full strength upper and lower extremities. No abnormal movements seen.      Pronator Drift: no drift noted  Finger-to-nose: Intact bilaterally    Significant Labs:  Recent Labs   Lab 12/15/23  0304 12/15/23  0652 12/16/23  0348   *  --  103     --  136   K 5.3* 4.3 4.2     --  106   CO2 26  --  25   BUN 14  --  13   CREATININE 0.9  --  1.0   CALCIUM 9.3  --  9.2   MG 2.8*  --  2.1     Recent Labs  "  Lab 12/15/23  0304 12/16/23  0348   WBC 4.27 4.20   HGB 13.0 12.7   HCT 38.8 38.0    261     No results for input(s): "LABPT", "INR", "APTT" in the last 48 hours.  Microbiology Results (last 7 days)       ** No results found for the last 168 hours. **          All pertinent labs from the last 24 hours have been reviewed.    Significant Diagnostics:  I have reviewed all pertinent imaging results/findings within the past 24 hours.  "

## 2023-12-17 VITALS
SYSTOLIC BLOOD PRESSURE: 148 MMHG | RESPIRATION RATE: 16 BRPM | DIASTOLIC BLOOD PRESSURE: 76 MMHG | OXYGEN SATURATION: 95 % | HEART RATE: 79 BPM | BODY MASS INDEX: 24.42 KG/M2 | HEIGHT: 64 IN | WEIGHT: 143.06 LBS | TEMPERATURE: 98 F

## 2023-12-17 LAB
ALBUMIN SERPL BCP-MCNC: 3.4 G/DL (ref 3.5–5.2)
ALP SERPL-CCNC: 96 U/L (ref 55–135)
ALT SERPL W/O P-5'-P-CCNC: 70 U/L (ref 10–44)
ANION GAP SERPL CALC-SCNC: 7 MMOL/L (ref 8–16)
AST SERPL-CCNC: 62 U/L (ref 10–40)
BASOPHILS # BLD AUTO: 0.06 K/UL (ref 0–0.2)
BASOPHILS NFR BLD: 1.5 % (ref 0–1.9)
BILIRUB SERPL-MCNC: 0.3 MG/DL (ref 0.1–1)
BUN SERPL-MCNC: 19 MG/DL (ref 6–20)
CALCIUM SERPL-MCNC: 9.1 MG/DL (ref 8.7–10.5)
CHLORIDE SERPL-SCNC: 106 MMOL/L (ref 95–110)
CO2 SERPL-SCNC: 24 MMOL/L (ref 23–29)
CREAT SERPL-MCNC: 1.1 MG/DL (ref 0.5–1.4)
DIFFERENTIAL METHOD: NORMAL
EOSINOPHIL # BLD AUTO: 0.3 K/UL (ref 0–0.5)
EOSINOPHIL NFR BLD: 6.9 % (ref 0–8)
ERYTHROCYTE [DISTWIDTH] IN BLOOD BY AUTOMATED COUNT: 12.3 % (ref 11.5–14.5)
EST. GFR  (NO RACE VARIABLE): 57.5 ML/MIN/1.73 M^2
GLUCOSE SERPL-MCNC: 107 MG/DL (ref 70–110)
HCT VFR BLD AUTO: 37.5 % (ref 37–48.5)
HGB BLD-MCNC: 12.6 G/DL (ref 12–16)
IMM GRANULOCYTES # BLD AUTO: 0.01 K/UL (ref 0–0.04)
IMM GRANULOCYTES NFR BLD AUTO: 0.2 % (ref 0–0.5)
LYMPHOCYTES # BLD AUTO: 1.5 K/UL (ref 1–4.8)
LYMPHOCYTES NFR BLD: 36.3 % (ref 18–48)
MAGNESIUM SERPL-MCNC: 1.9 MG/DL (ref 1.6–2.6)
MCH RBC QN AUTO: 30.7 PG (ref 27–31)
MCHC RBC AUTO-ENTMCNC: 33.6 G/DL (ref 32–36)
MCV RBC AUTO: 91 FL (ref 82–98)
MONOCYTES # BLD AUTO: 0.4 K/UL (ref 0.3–1)
MONOCYTES NFR BLD: 8.9 % (ref 4–15)
NEUTROPHILS # BLD AUTO: 1.9 K/UL (ref 1.8–7.7)
NEUTROPHILS NFR BLD: 46.2 % (ref 38–73)
NRBC BLD-RTO: 0 /100 WBC
PHOSPHATE SERPL-MCNC: 4.1 MG/DL (ref 2.7–4.5)
PLATELET # BLD AUTO: 234 K/UL (ref 150–450)
PMV BLD AUTO: 9.5 FL (ref 9.2–12.9)
POTASSIUM SERPL-SCNC: 4.4 MMOL/L (ref 3.5–5.1)
PROT SERPL-MCNC: 6.6 G/DL (ref 6–8.4)
RBC # BLD AUTO: 4.11 M/UL (ref 4–5.4)
SODIUM SERPL-SCNC: 137 MMOL/L (ref 136–145)
WBC # BLD AUTO: 4.05 K/UL (ref 3.9–12.7)

## 2023-12-17 PROCEDURE — 99239 HOSP IP/OBS DSCHRG MGMT >30: CPT | Mod: ,,, | Performed by: PSYCHIATRY & NEUROLOGY

## 2023-12-17 PROCEDURE — 36415 COLL VENOUS BLD VENIPUNCTURE: CPT

## 2023-12-17 PROCEDURE — 80053 COMPREHEN METABOLIC PANEL: CPT

## 2023-12-17 PROCEDURE — 99239 PR HOSPITAL DISCHARGE DAY,>30 MIN: ICD-10-PCS | Mod: ,,, | Performed by: PSYCHIATRY & NEUROLOGY

## 2023-12-17 PROCEDURE — 85025 COMPLETE CBC W/AUTO DIFF WBC: CPT

## 2023-12-17 PROCEDURE — 84100 ASSAY OF PHOSPHORUS: CPT

## 2023-12-17 PROCEDURE — 83735 ASSAY OF MAGNESIUM: CPT

## 2023-12-17 PROCEDURE — 25000003 PHARM REV CODE 250: Performed by: PHYSICIAN ASSISTANT

## 2023-12-17 PROCEDURE — 25000003 PHARM REV CODE 250

## 2023-12-17 RX ORDER — METOCLOPRAMIDE 10 MG/1
10 TABLET ORAL EVERY 6 HOURS PRN
Qty: 60 TABLET | Refills: 0 | Status: SHIPPED | OUTPATIENT
Start: 2023-12-17 | End: 2024-01-09

## 2023-12-17 RX ORDER — GABAPENTIN 400 MG/1
400 CAPSULE ORAL 3 TIMES DAILY
Qty: 90 CAPSULE | Refills: 11 | Status: SHIPPED | OUTPATIENT
Start: 2023-12-17 | End: 2024-01-11

## 2023-12-17 RX ADMIN — ACETAMINOPHEN 1000 MG: 500 TABLET ORAL at 02:12

## 2023-12-17 RX ADMIN — ACETAMINOPHEN 1000 MG: 500 TABLET ORAL at 05:12

## 2023-12-17 RX ADMIN — GABAPENTIN 400 MG: 400 CAPSULE ORAL at 02:12

## 2023-12-17 RX ADMIN — METHOCARBAMOL 750 MG: 750 TABLET ORAL at 02:12

## 2023-12-17 RX ADMIN — AMLODIPINE BESYLATE 10 MG: 10 TABLET ORAL at 08:12

## 2023-12-17 RX ADMIN — GABAPENTIN 400 MG: 400 CAPSULE ORAL at 08:12

## 2023-12-17 RX ADMIN — METHOCARBAMOL 750 MG: 750 TABLET ORAL at 08:12

## 2023-12-17 NOTE — ASSESSMENT & PLAN NOTE
Aggressive risk factor modification: HTN     Rehab efforts: The patient has been evaluated by a stroke team provider and the therapy needs have been fully considered based off the presenting complaints and exam findings. The following therapy evaluations are needed: None    Diagnostics ordered/pending: Other: Cerebral angiogram, Echocardiogram    VTE prophylaxis: None: Reason for No Pharmacological VTE Prophylaxis: SAH    BP parameters: SAH: <140      
      Aggressive risk factor modification: HTN     Rehab efforts: The patient has been evaluated by a stroke team provider and the therapy needs have been fully considered based off the presenting complaints and exam findings. The following therapy evaluations are needed: None    Diagnostics ordered/pending: Other: Cerebral angiogram, Echocardiogram    VTE prophylaxis: None: Reason for No Pharmacological VTE Prophylaxis: SAH    BP parameters: SAH: <140      
      Aggressive risk factor modification: HTN     Rehab efforts: The patient has been evaluated by a stroke team provider and the therapy needs have been fully considered based off the presenting complaints and exam findings. The following therapy evaluations are needed: None    Diagnostics ordered/pending: Other: Cerebral angiogram, Echocardiogram . CTA    VTE prophylaxis: Enoxaparin    BP parameters: SAH: <140      
-Stroke risk factor.  Noted in the patient's record as of 6/2020.  Patient not currently prescribed statin.  -Lipid panel WNL  -statin not indicated  
-Stroke risk factor.  Noted in the patient's record as of 6/2020.  Patient not currently prescribed statin.  -Lipid panel is pending  
-Stroke risk factor.  Noted in the patient's record as of 8/2019.  Patient not currently prescribed medications.  -Initial SBP 200s.  -Currently on Cardene gtt  -Monitor for need to d/c on home BP meds  
-Stroke risk factor.  Noted in the patient's record as of 8/2019.  Patient not currently prescribed medications.  -Initial SBP 200s.  -SBP <140  -Initially on cardene, currently off at this time  -on amlodipine 5 mg daily  -Monitor for need to d/c on home BP meds  
-Stroke risk factor.  Noted in the patient's record as of 8/2019.  Patient not currently prescribed medications.  -Initial SBP 200s.  -SBP <140  -Initially on cardene, currently off at this time  -on amlodipine 5 mg daily  -PRN labetalol  -Monitor for need to d/c on home BP meds  
-Stroke risk factor.  Noted in the patient's record as of 8/2019.  Patient not currently prescribed medications.  -Initial SBP 200s.  -SBP <140  -Initially on cardene, currently off at this time  -on amlodipine 5 mg daily  -PRN labetalol  -Monitor for need to d/c on home BP meds  
-Stroke risk factor.  Noted in the patient's record as of 8/2019.  Patient not currently prescribed medications.  -Initial SBP 200s.  -SBP <140  -Initially on cardene, currently off at this time  -on amlodipine 5 mg daily, increased to 10 mg today  -PRN labetalol  -Monitor for need to d/c on home BP meds  
57 y/o F w/ PMH migraines, anxiety, and HTN who presented to [facility] [place] [facility] with SAH. Patient states that she was working out this morning when she developed a sudden, severe headache, described as among the worst of her life, which prompted her to come to the [facility] [place] [facility] ED. According to patient, the headache's improved considerably after she took Excedrin and reduced her activity; if she sits with her eyes closed the pain is 2/10 in intensity and increases to 9/10 with movement. Upon presentation to [facility] [place] [facility], patient's BP was measured into the 200s systolics. The patient states that she had a BP into 180s at her last visit with her general practitioner but was not prescribed medication because her pressures were in normal at home. 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 The patient will be admitted to [facility] for further neuro-monitoring and higher level of care.    Plan:  -Admit to [facility]  -NSGY and [facility] Neuro following  -SBP <140  -Daily TCDs  -Follow up CT 4PM today  -Daily CBC, CMP, Mg, Phos, Lipids  -Nimodipine 60 mg q4hrs   -Keppra 500 mg BID 7 days  -Q1 neurochecks   -Pain control  -Consider echocardiogram  -Possible cerebral angiogram w/ IR per NSGY    12/9: Will go for cerebral angiogram w/ neuro IR today to assess for presence of aneurysm or AVM  12/10: angio today    12/11: To go for cerebral angiogram today. TCDs stable, showing no vasospasm.     12/12: Cerebral angiogram normal. To go for CTA Friday; can discharge home if normal. Discontinued Keppra and Nimodipine. Will continue daily TCDs.  
57 y/o F w/ PMH migraines, anxiety, and HTN who [place] [facility] [place]H. Patient states that she was working out this morning when she developed a sudden, severe headache, described as among the worst of her life, which prompted her to come to the [facility] ED. According to patient, the headache's improved considerably after she took Excedrin and reduced her activity; if she sits with her eyes closed the pain is 2/10 in intensity and increases to 9/10 with movement. Upon presentation to [facility], patient's BP was measured into the 200s systolics. The patient states that she had a BP into 180s at her last visit with her general practitioner but was not prescribed medication because her pressures were in normal at home. 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 The patient will be admitted to [facility] for further neuro-monitoring and higher level of care.    Plan:  -Admit to [facility]  -NSGY and [facility] Neuro following  -SBP <140  -Daily TCDs  -Follow up CT 4PM today  -Daily CBC, CMP, Mg, Phos, Lipids  -Nimodipine 60 mg q4hrs   -Keppra 500 mg BID 7 days  -Q1 neurochecks   -Pain control  -Consider echocardiogram  -Possible cerebral angiogram w/ IR per NSGY    12/9: Will go for cerebral angiogram w/ neuro IR today to assess for presence of aneurysm or AVM  12/10: angio today  
57 y/o F w/ PMH migraines, anxiety, and HTN who presented to [facility] with SAH. Patient states that she was working out this morning when she developed a sudden, severe headache, described as among the worst of her life, which prompted her to come to the [facility] ED. According to patient, the headache's improved considerably after she took Excedrin and reduced her activity; if she sits with her eyes closed the pain is 2/10 in intensity and increases to 9/10 with movement. Upon presentation to [facility], patient's BP was measured into the 200s systolics. The patient states that she had a BP into 180s at her last visit with her general practitioner but was not prescribed medication because her pressures were in normal at home. 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 The patient will be admitted to [facility] for further neuro-monitoring and higher level of care.    Plan:  -Admit to [facility]  -NSGY and [facility] Neuro following  -SBP <140  -Daily TCDs  -Follow up CT 4PM today  -Daily CBC, CMP, Mg, Phos, Lipids  -Nimodipine 60 mg q4hrs   -Keppra 500 mg BID 7 days  -Q1 neurochecks   -Pain control  -Consider echocardiogram  -Possible cerebral angiogram w/ IR per NSGY    12/9: Will go for cerebral angiogram w/ neuro IR today to assess for presence of aneurysm or AVM  
57 yo female with history of HTN who presents with nontraumatic prepontine subarachnoid hemorrhage, HH2F2    -Admit to [facility]  -Q1h neuro checks  -Imaging and Diagnostics reviewed  -CTA shows stable subarachnoid hemorrhage.  No hydrocephalus. No intracranial aneurysm or AVM is identified.  -SBP<140  -HOB @30  -Keppra 500mg BID x 7 days  -TCDs daily  x 14d  -Nimotop 60q4h x 21d  -Eunatremic  -Continue to follow clinically and notify NSGY with any decline in neuro status.     Dispo: ongoing    Discussed with Dr. Velasquez   
57 yo female with history of HTN who presents with nontraumatic prepontine subarachnoid hemorrhage, HH2F2    -Downgraded to NPU under VN service with q4 neuro checks  -Imaging and Diagnostics reviewed  -CTA 12/8 shows stable subarachnoid hemorrhage.  No hydrocephalus. No intracranial aneurysm or AVM is identified.  -DSA 12/11 normal  -Please obtain repeat CTA head 12/15, PBD 7 - negative   - plan for follow up outpatient in 4-6 weeks with repeat CTA  -SBP<140  -HOB @30  -Keppra discontinued, no further need for seizure PPx  -TCDs and Nimotop discontinued  -Eunatremic  -Continue to follow clinically and notify NSGY with any decline in neuro status.     Dispo: ongoing; nsgy will sign off   
57 yo female with history of HTN who presents with nontraumatic prepontine subarachnoid hemorrhage, HH2F2    -Downgraded to NPU under VN service with q4 neuro checks  -Imaging and Diagnostics reviewed  -CTA 12/8 shows stable subarachnoid hemorrhage.  No hydrocephalus. No intracranial aneurysm or AVM is identified.  -DSA 12/11 normal  -Please obtain repeat CTA head 12/15, PBD 7 - pending; will review once completed   - If stable and negative for any abnormal vascular findings, will plan for follow up outpatient in 4-6 weeks with repeat CTA  -SBP<140  -HOB @30  -Keppra discontinued, no further need for seizure PPx  -TCDs and Nimotop discontinued  -Eunatremic  -Continue to follow clinically and notify NSGY with any decline in neuro status.     Dispo: ongoing    Discussed with Dr. Iniguez  
57 yo female with history of HTN who presents with nontraumatic prepontine subarachnoid hemorrhage, HH2F2    -Downgraded to NPU with q4 neuro checks  -Imaging and Diagnostics reviewed  -CTA shows stable subarachnoid hemorrhage.  No hydrocephalus. No intracranial aneurysm or AVM is identified.  -DSA normal  -Please obtain repeat CTA head 12/15, PBD 7  -SBP<140  -HOB @30  -Keppra 500mg BID x 7 days  -TCDs daily  x 14d  -Nimotop 60q4h x 21d  -Eunatremic  -Continue to follow clinically and notify NSGY with any decline in neuro status.     Dispo: ongoing    Discussed with Dr. Iniguez  
59 y/o F w/ PMH migraines, anxiety, and HTN who [place] [facility] [place]H. Patient states that she was working out this morning when she developed a sudden, severe headache, described as among the worst of her life, which prompted her to come to the [facility] [place]. According to patient, the headache's improved considerably after she took Excedrin and reduced her activity; if she sits with her eyes closed the pain is 2/10 in intensity and increases to 9/10 with movement. Upon presentation to [facility], patient's BP was measured into the 200s systolics. The patient states that she had a BP into 180s at her last visit with her general practitioner but was not prescribed medication because her pressures were in normal at home. 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 The patient will be admitted to [facility] for further neuro-monitoring and higher level of care.    Plan:  -Admit to [facility]  -NSGY and [facility] Neuro following  -SBP <140  -Daily TCDs  -Follow up CT 4PM today  -Daily CBC, CMP, Mg, Phos, Lipids  -Nimodipine 60 mg q4hrs   -Keppra 500 mg BID 7 days  -Q1 neurochecks   -Pain control  -Consider echocardiogram  -Possible cerebral angiogram w/ IR per NSGY    12/9: Will go for cerebral angiogram w/ neuro IR today to assess for presence of aneurysm or AVM  12/10: angio today    12/11: To go for cerebral angiogram today. TCDs stable, showing no vasospasm.     12/12: Cerebral angiogram normal. To go for CTA Friday; can discharge home if normal. Discontinued Keppra and Nimodipine. Will continue daily TCDs.  
59 y/o F w/ PMH migraines, anxiety, and HTN who presented to [facility] [place] [facility] with SAH. Patient states that she was working out this morning when she developed a sudden, severe headache, described as among the worst of her life, which prompted her to come to the [facility] [place] [facility] ED. According to patient, the headache's improved considerably after she took Excedrin and reduced her activity; if she sits with her eyes closed the pain is 2/10 in intensity and increases to 9/10 with movement. Upon presentation to [facility], patient's BP was measured into the 200s systolics. The patient states that she had a BP into 180s at her last visit with her general practitioner but was not prescribed medication because her pressures were in normal at home. 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 The patient will be admitted to [facility] for further neuro-monitoring and higher level of care.    Plan:  -Admit to [facility]  -NSGY and [facility] Neuro following  -SBP <140  -Daily TCDs  -Follow up CT 4PM today  -Daily CBC, CMP, Mg, Phos, Lipids  -Nimodipine 60 mg q4hrs   -Keppra 500 mg BID 7 days  -Q1 neurochecks   -Pain control  -Consider echocardiogram  -Possible cerebral angiogram w/ IR per NSGY  
59 y/o F w/ PMH migraines, anxiety, and HTN who presented to [facility] with SAH. Patient states that she was working out this morning when she developed a sudden, severe headache, described as among the worst of her life, which prompted her to come to the [facility] ED. According to patient, the headache's improved considerably after she took Excedrin and reduced her activity; if she sits with her eyes closed the pain is 2/10 in intensity and increases to 9/10 with movement. Upon presentation to [facility], patient's BP was measured into the 200s systolics. The patient states that she had a BP into 180s at her last visit with her general practitioner but was not prescribed medication because her pressures were in normal at home. 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 The patient will be admitted to [facility] for further neuro-monitoring and higher level of care.    Plan:  -Admit to [facility]  -NSGY and [facility] Neuro following  -SBP <140  -Daily TCDs  -Follow up CT 4PM today  -Daily CBC, CMP, Mg, Phos, Lipids  -Nimodipine 60 mg q4hrs   -Keppra 500 mg BID 7 days  -Q1 neurochecks   -Pain control  -Consider echocardiogram  -Possible cerebral angiogram w/ IR per NSGY    12/9: Will go for cerebral angiogram w/ neuro IR today to assess for presence of aneurysm or AVM  12/10: angio today    12/11: To go for cerebral angiogram today. TCDs stable, showing no vasospasm.   
59 yo female with history of HTN who presents with nontraumatic prepontine subarachnoid hemorrhage, HH2F2    -Admit to [facility]  -Q1h neuro checks  -Imaging and Diagnostics reviewed  -CTA shows stable subarachnoid hemorrhage.  No hydrocephalus. No intracranial aneurysm or AVM is identified.  -DSA normal  -SBP<140  -HOB @30  -Keppra 500mg BID x 7 days  -TCDs daily  x 14d  -Nimotop 60q4h x 21d  -Eunatremic  -Continue to follow clinically and notify NSGY with any decline in neuro status.     Dispo: ongoing    Discussed with Dr. Iniguez  
59 yo female with history of HTN who presents with nontraumatic prepontine subarachnoid hemorrhage, HH2F2    -Admit to [facility]  -Q1h neuro checks  -Imaging and Diagnostics reviewed  -CTA shows stable subarachnoid hemorrhage.  No hydrocephalus. No intracranial aneurysm or AVM is identified.  -DSA normal  -SBP<140  -HOB @30  -Keppra 500mg BID x 7 days  -TCDs daily  x 14d  -Nimotop 60q4h x 21d  -Eunatremic  -Continue to follow clinically and notify NSGY with any decline in neuro status.     Dispo: ongoing    Discussed with Dr. Iniguez  
59 yo female with history of HTN who presents with nontraumatic prepontine subarachnoid hemorrhage, HH2F2    -Admit to [facility]  -Q1h neuro checks  -Imaging and Diagnostics reviewed  -CTA shows stable subarachnoid hemorrhage.  No hydrocephalus. No intracranial aneurysm or AVM is identified.  -Recommend cerebral angiogram with IR  -SBP<140  -HOB @30  -Keppra 500mg BID x 7 days  -TCDs daily  x 14d  -Nimotop 60q4h x 21d  -Eunatremic  -Continue to follow clinically and notify NSGY with any decline in neuro status. Further recommendations pending CTA  -Discussed with Dr. Velasquez   
Gabapentin, tylenol, reglan for HA  
Hx of elevated lipoproteins   Will get new lipid panel   
Laura Newell is a 58 y.o. female with a significant medical history of asthma who presents to the hospital as a transfer from [facility] for evaluation of SAH.  She was hypertensive on presentation to the OSH ED, 200s/100s. CT with perimesencephalic SAH. CTA negative for aneurysm or other cerebrovascular abnormalities. TTE EF 65%, no LAE, normal wall thickness noted. DSA unremarkable for vascular abnormalities. Followup CTA on day 7 post-bleed negative for aneurysm or other vascular lesion, however CTH suspicious for patchy juxtacortical hypodensities.  MRI brain pending.          Antithrombotics for secondary stroke prevention: Antiplatelets: None: Intracerebral Hemorrhage    Statins for secondary stroke prevention and hyperlipidemia, if present:   Statins: None: Reason: SAH    Aggressive risk factor modification: None     Rehab efforts: no rehab needs    Diagnostics ordered/pending: MRI brain    VTE prophylaxis: Enoxaparin 40 mg SQ every 24 hours  Mechanical prophylaxis: Place SCDs    BP parameters: SAH: SBP<140      
Laura Newell is a 58 y.o. female with a significant medical history of asthma who presents to the hospital as a transfer from [facility] for evaluation of SAH.  She was hypertensive on presentation to the OSH ED, 200s/100s. CT with perimesencephalic SAH. CTA negative for aneurysm or other cerebrovascular abnormalities. TTE EF 65%, no LAE, normal wall thickness noted. DSA unremarkable for vascular abnormalities.    Patient stepped down. HA more uncontrolled today with new phonophobia, STAT CTH pending, increasing amlodipine, adjusting HA medication regimen, repeating CTA tomorrow. Daily TCDs discontinue as they are not indicated for perimesencephalic SAH.       Antithrombotics for secondary stroke prevention: Antiplatelets: None: Intracerebral Hemorrhage    Statins for secondary stroke prevention and hyperlipidemia, if present:   Statins: None: Reason: SAH    Aggressive risk factor modification: None     Rehab efforts: The patient has been evaluated by a stroke team provider and the therapy needs have been fully considered based off the presenting complaints and exam findings. The following therapy evaluations are needed: PT evaluate and treat, OT evaluate and treat    Diagnostics ordered/pending: Other: Daily TCDs    VTE prophylaxis: Enoxaparin 40 mg SQ every 24 hours  Mechanical prophylaxis: Place SCDs    BP parameters: SAH: SBP<140      
Laura Newell is a 58 y.o. female with a significant medical history of asthma who presents to the hospital as a transfer from [facility] for evaluation of SAH.  She was hypertensive on presentation to the OSH ED, 200s/100s.  A CTH showed SAH.  Cardene gtt was started.  The patient was transferred for higher level of care.      Antithrombotics for secondary stroke prevention: Antiplatelets: None: Intracerebral Hemorrhage    Statins for secondary stroke prevention and hyperlipidemia, if present:   Statins: None: Reason: Not indicated in acute SAH though if LDL is >70 consider starting    Aggressive risk factor modification: None     Rehab efforts: The patient has been evaluated by a stroke team provider and the therapy needs have been fully considered based off the presenting complaints and exam findings. The following therapy evaluations are needed: PT evaluate and treat, OT evaluate and treat    Diagnostics ordered/pending: CT scan of head without contrast to asses brain parenchyma, HgbA1C to assess blood glucose levels, Lipid Profile to assess cholesterol levels, TTE to assess cardiac function/status , TSH to assess thyroid function, Other: DSA    VTE prophylaxis: Mechanical prophylaxis: Place SCDs  None: Reason for No Pharmacological VTE Prophylaxis: SAH    BP parameters: SAH: SBP<140      
Laura Newell is a 58 y.o. female with a significant medical history of asthma who presents to the hospital as a transfer from [facility] for evaluation of SAH.  She was hypertensive on presentation to the OSH ED, 200s/100s. CT with perimesencephalic SAH. CTA negative for aneurysm or other cerebrovascular abnormalities. TTE EF 65%, no LAE, normal wall thickness noted. DSA unremarkable for vascular abnormalities.    NAEON. Neuro exam stable. Patient continues to have intermittent headaches, neck pain, and nausea. Patient reports relief with zofran. Tylenol was given for headache and neck pain. Daily TCDs in progress, no evidence of vasospasms noted for today's TCD. Patient stable for NPU stepdown.      Antithrombotics for secondary stroke prevention: Antiplatelets: None: Intracerebral Hemorrhage    Statins for secondary stroke prevention and hyperlipidemia, if present:   Statins: None: Reason: SAH    Aggressive risk factor modification: None     Rehab efforts: The patient has been evaluated by a stroke team provider and the therapy needs have been fully considered based off the presenting complaints and exam findings. The following therapy evaluations are needed: PT evaluate and treat, OT evaluate and treat    Diagnostics ordered/pending: Other: Daily TCDs    VTE prophylaxis: Mechanical prophylaxis: Place SCDs  None: Reason for No Pharmacological VTE Prophylaxis: SAH    BP parameters: SAH: SBP<140      
Laura Newell is a 58 y.o. female with a significant medical history of asthma who presents to the hospital as a transfer from [facility] for evaluation of SAH.  She was hypertensive on presentation to the OSH ED, 200s/100s. CT with perimesencephalic SAH. CTA negative for aneurysm or other cerebrovascular abnormalities. TTE EF 65%, no LAE, normal wall thickness noted. DSA unremarkable for vascular abnormalities.     Repeat CTA (day 7) pending.          Antithrombotics for secondary stroke prevention: Antiplatelets: None: Intracerebral Hemorrhage    Statins for secondary stroke prevention and hyperlipidemia, if present:   Statins: None: Reason: SAH    Aggressive risk factor modification: None     Rehab efforts: The patient has been evaluated by a stroke team provider and the therapy needs have been fully considered based off the presenting complaints and exam findings. The following therapy evaluations are needed: PT evaluate and treat, OT evaluate and treat    Diagnostics ordered/pending: Other: Daily TCDs    VTE prophylaxis: Enoxaparin 40 mg SQ every 24 hours  Mechanical prophylaxis: Place SCDs    BP parameters: SAH: SBP<140      
Laura Newell is a 58 y.o. female with a significant medical history of asthma who presents to the hospital as a transfer from [facility] for evaluation of SAH.  She was hypertensive on presentation to the OSH ED, 200s/100s. CT with perimesencephalic SAH. CTA negative for aneurysm or other cerebrovascular abnormalities. TTE EF 65%, no LAE, normal wall thickness noted. DSA unremarkable for vascular abnormalities.    NAEON. Neuro exam stable. Patient continues to have persistent photophobia and headaches. TCDs stable. Patient pending NPU stepdown.      Antithrombotics for secondary stroke prevention: Antiplatelets: None: Intracerebral Hemorrhage    Statins for secondary stroke prevention and hyperlipidemia, if present:   Statins: None: Reason: SAH    Aggressive risk factor modification: None     Rehab efforts: The patient has been evaluated by a stroke team provider and the therapy needs have been fully considered based off the presenting complaints and exam findings. The following therapy evaluations are needed: PT evaluate and treat, OT evaluate and treat    Diagnostics ordered/pending: Other: Daily TCDs    VTE prophylaxis: Mechanical prophylaxis: Place SCDs  None: Reason for No Pharmacological VTE Prophylaxis: SAH    BP parameters: SAH: SBP<140      
Laura Newell is a 58 y.o. female with a significant medical history of asthma who presents to the hospital as a transfer from [facility] for evaluation of SAH.  She was hypertensive on presentation to the OSH ED, 200s/100s.  CT with perimesencephalic SAH. CTA negative for aneurysm or other cerebrovascular abnormalities. DSA pending    Continue daily TCDs, nimodipine daily      Antithrombotics for secondary stroke prevention: Antiplatelets: None: Intracerebral Hemorrhage    Statins for secondary stroke prevention and hyperlipidemia, if present:   Statins: None: Reason: SAH    Aggressive risk factor modification: None     Rehab efforts: The patient has been evaluated by a stroke team provider and the therapy needs have been fully considered based off the presenting complaints and exam findings. The following therapy evaluations are needed: PT evaluate and treat, OT evaluate and treat    Diagnostics ordered/pending: Other: DSA, daily TCDs    VTE prophylaxis: Mechanical prophylaxis: Place SCDs  None: Reason for No Pharmacological VTE Prophylaxis: SAH    BP parameters: SAH: SBP<140      
Previously on Losartan-HCTZ for HTN. Has not been on medication for extended period of time due to reported normal home pressures.  Will keep SBP <140 post SAH. Currently on Cardene drip. Will add Labetolol and Hydralazine PRNs.   
Previously on Losartan-HCTZ for HTN. Has not been on medication for extended period of time due to reported normal home pressures.  Will keep SBP <140 post SAH. Currently on Cardene drip. Will add Labetolol and Hydralazine PRNs.     12/9: BP controlled of cardene drip. Amlodipine 5mg added.  
Stepped down on scheduled gabapentin and robaxin, prn tylenol and oxy  Starting scheduled tylenol 1000 mg TID qh  Will adjust medications further as needed  
Detail Level: Detailed
Size Of Lesion: 3 mm brown papule
Size Of Lesion: 4 mm tan macule
Size Of Lesion: 7 mm x 9 mm uniform tan macule

## 2023-12-17 NOTE — PLAN OF CARE
Problem: Adult Inpatient Plan of Care  Goal: Plan of Care Review  Outcome: Met  Goal: Patient-Specific Goal (Individualized)  Outcome: Met  Goal: Absence of Hospital-Acquired Illness or Injury  Outcome: Met  Goal: Optimal Comfort and Wellbeing  Outcome: Met  Goal: Readiness for Transition of Care  Outcome: Met     Problem: Adjustment to Illness (Stroke, Hemorrhagic)  Goal: Optimal Coping  Outcome: Met     Problem: Cerebral Tissue Perfusion (Stroke, Hemorrhagic)  Goal: Optimal Cerebral Tissue Perfusion  Outcome: Met     Problem: Cognitive Impairment (Stroke, Hemorrhagic)  Goal: Optimal Cognitive Function  Outcome: Met     Problem: Functional Ability Impaired (Stroke, Hemorrhagic)  Goal: Optimal Functional Ability  Outcome: Met     Problem: Pain (Stroke, Hemorrhagic)  Goal: Acceptable Pain Control  Outcome: Met     Problem: Respiratory Compromise (Stroke, Hemorrhagic)  Goal: Effective Oxygenation and Ventilation  Outcome: Met     Problem: Sensorimotor Impairment (Stroke, Hemorrhagic)  Goal: Improved Sensorimotor Function  Outcome: Met     Problem: Swallowing Impairment (Stroke, Hemorrhagic)  Goal: Optimal Eating and Swallowing Without Aspiration  Outcome: Met     Problem: Urinary Elimination Impaired (Stroke, Hemorrhagic)  Goal: Effective Urinary Elimination  Outcome: Met

## 2023-12-17 NOTE — PLAN OF CARE
Patient will transport home at discharge with her . No additional needs at this time.   12/17/23 1434   Final Note   Assessment Type Final Discharge Note   Anticipated Discharge Disposition Home   Hospital Resources/Appts/Education Provided Provided patient/caregiver with written discharge plan information   Post-Acute Status   Discharge Delays None known at this time     [facility] (Hospital)  Discharge Final Note    Primary Care Provider: Frankie Suh MD    Expected Discharge Date: 12/17/2023    Final Discharge Note (most recent)       Final Note - 12/17/23 1434          Final Note    Assessment Type Final Discharge Note (P)      Anticipated Discharge Disposition Home or Self Care (P)      Hospital Resources/Appts/Education Provided Provided patient/caregiver with written discharge plan information (P)         Post-Acute Status    Discharge Delays None known at this time (P)                      Important Message from Medicare             Contact Info       Frankie Suh MD   Specialty: Internal Medicine   Relationship: PCP - General    [address] [place] [id number]   Phone: [phone]       Next Steps: Follow up    [facility] VASCULAR NEUROLOGY   Specialty: Vascular Neurology    [address]  [facility] [address]   Phone: [phone]       Next Steps: Follow up in 4 week(s)    Instructions: Someone will contact you to schedule your stroke follow up appointment

## 2023-12-17 NOTE — DISCHARGE INSTRUCTIONS
Follow up with   -Vascular neurology  -Neurosurgery  -Multiple sclerosis clinic  -Take Tylenol and gabapentin scheduled for the headache . If needed add Reglan and methocarbamol. Try to stop or wean off in 1-2 Weeks.   -take amlodipine 10 mg for hypertension and follow up WITH PCP

## 2023-12-17 NOTE — DISCHARGE SUMMARY
"[facility] ([facility])  Vascular Neurology  Comprehensive Stroke Center  Discharge Summary     Summary:     Admit Date: 12/8/2023 11:12 AM    Discharge Date and Time:  12/17/2023 12:43 PM    Attending Physician: Carol Goodwin MD     Discharge Provider: Pricila Ramírez DNP, NP    History of Present Illness: Laura Newell is a 58 y.o. female with a significant medical history of asthma who presents to the hospital as a transfer from [facility] for evaluation of SAH.  The patient was in her usual state of health and at the gym.  She was lifting weights when she had acute onset headache.  She took Excedrin Migraine with some relief but presented to the OS ED about an hour after the onset of the HA.  On arrival to the OSH ED the patient's BP was 200s/100s. She denied history of HTN.  A CTH was obtained and revealed a SAH.  The patient was transferred to [facility] for higher level of care.    Currently the patient has a HA and photophobia.  She states "keeping still and not talking or moving much" helps decrease the HA though it remains.  She also states it improves w/her eyes closed.        Hospital Course (synopsis of major diagnoses, care, treatment, and services provided during the course of the hospital stay):   12/9/23 patient reports improvement in HA, rates pain 2/10, persistent photophobia, exam nonfocal, CTA negative for aneurysm or other cerebrovascular abnormality, DSA pending, TCD pending  12/12/23: NAEON. Neuro exam stable. Patient continues to have intermittent headaches, neck pain, and nausea. Patient reports relief with zofran. Tylenol was given for headache and neck pain. Daily TCDs in progress, no evidence of vasospasms noted for today's TCD. Patient stable for NPU stepdown.  12/13/23: NAEON. Neuro exam stable. Patient continues to have persistent photophobia and headaches. TCDs stable. Patient pending NPU stepdown.  12/14/23: HA more uncontrolled today " with new phonophobia, STAT CTH pending, increasing amlodipine, adjusting HA medication regimen, repeating CTA tomorrow  12/15/23: HA exacerbated overnight, improved this AM.  Reglan, standing tylenol, and increasing GBP helped.  Pending CTA.  12/16/23: repeat CTA with no abnormalities and resolution of the previous SAH. Head ache improving.   12/17/2023: MRI with significant Microvascular changes with no significant history og hypertension     Discharge follow up   -SAH (resolving) > will follow up with neurosurgery in 4-6 weeks with repeat CTA. Follow up with vascular neurology in 4-5 weeks.   -Abnormal MRI > follow up with MS clinic for further work up if indicated  -Headache due to meningeal irritation (improving) > will dc on scheduled tylenol and gabapentin + can do PRN Reglan and methocarbamol . Try yo wean off in 1-2 weeks  -Hyphenation > amlodipine increased to 10 mg daily with plan to F/U with PCP       Goals of Care Treatment Preferences:  Code Status: Full Code      Stroke Etiology: Hemorrhage SAH Undetermined    STROKE DOCUMENTATION         NIH Scale:           Modified [place] Score: 0  [place] Coma Scale:    ABCD2 Score:    [id number] Score:   HAS -BLED Score:   ICH Score:0  Hunt & Bravo Classification:Grade I      Assessment/Plan:     Diagnostic Results:      Brain Imaging:   Brain MRI 12/15/2023  Impression:     Punctate size foci of signal abnormality right basal ganglia and right superior cerebellum concerning for recent infarcts.  No significant mass effect or hemorrhagic conversion.     Superimposed patchy and confluent T2 FLAIR signal abnormality supratentorial white matter which is nonspecific and may be advanced chronic microvascular ischemic change.  Small T2 FLAIR hyperintensity right dorsal thalamus suggestive for remote lacunar-type infarct      Vessel Imaging:  CTA Head and neck 12/15/2023  Impression:     CTA head: Unremarkable CTA of the head specifically without evidence for proximal  significant stenosis or definite intracranial aneurysm.     Please see above for additional details.     CTA neck: No significant arterial stenosis throughout the neck.     CT head: Resolution previous subarachnoid hemorrhage.  No evidence for new hemorrhage or definite new abnormal parenchymal attenuation.  Cardiac Evaluation:   Summary         Left Ventricle: The left ventricle is normal in size. Normal wall thickness. Normal wall motion. There is normal systolic function. Ejection fraction by visual approximation is 65%. There is normal diastolic function.    Right Ventricle: Normal right ventricular cavity size. Wall thickness is normal. Right ventricle wall motion  is normal. Systolic function is normal.    Pulmonary Artery: The estimated pulmonary artery systolic pressure is 18 mmHg.    IVC/SVC: Normal venous pressure at 3 mmHg.    Interventions: None    Complications: None    Disposition: Home or Self Care    Final Active Diagnoses:    Diagnosis Date Noted POA    PRINCIPAL PROBLEM:  Nontraumatic subarachnoid hemorrhage [I60.9] 12/08/2023 Yes    Essential hypertension [I10] 08/07/2019 Yes    Elevated cholesterol [E78.00] 06/17/2020 Yes    Cephalalgia [R51.9] 12/10/2023 Yes      Problems Resolved During this Admission:    Diagnosis Date Noted Date Resolved POA    Vasogenic cerebral edema [G93.6] 12/14/2023 12/15/2023 Yes     No new Assessment & Plan notes have been filed under this hospital service since the last note was generated.  Service: Vascular Neurology      Recommendations:     Post-discharge complication risks: None    Stroke Education given to: patient    Follow-up in Stroke Clinic in 4-6 weeks.     Discharge Plan:  Aggresive risk factor modification:  Hypertension    Follow Up:   Follow-up Information       Frankie Suh MD Follow up.    Specialty: Internal Medicine  Contact information:  [address] [facility]  [address] [place], [place] [id number] [phone]               [facility] VASCULAR NEUROLOGY Follow up  in 4 week(s).    Specialty: Vascular Neurology  Why: Someone will contact you to schedule your stroke follow up appointment  Contact information:  [address]  [facility] [address]  [phone]                           Patient Instructions:      Ambulatory referral/consult to Vascular Neurology   Standing Status: Future   Referral Priority: Routine Referral Type: Consultation   Referral Reason: Specialty Services Required   Referred to Provider: SEBAS MOSCOSO Requested Specialty: Vascular Neurology   Number of Visits Requested: 1     Ambulatory referral/consult to Neurology   Standing Status: Future   Referral Priority: Routine Referral Type: Consultation   Referral Reason: Specialty Services Required   Requested Specialty: Neurology   Number of Visits Requested: 1     Ambulatory referral/consult to Neurology   Standing Status: Future   Referral Priority: Routine Referral Type: Consultation   Referral Reason: Specialty Services Required   Requested Specialty: Neurology   Number of Visits Requested: 1       Medications:  Reconciled Home Medications:      Medication List        START taking these medications      acetaminophen 500 MG tablet  Commonly known as: TYLENOL  Take 2 tablets (1,000 mg total) by mouth every 6 (six) hours as needed for Pain (headache).     gabapentin 400 MG capsule  Commonly known as: NEURONTIN  Take 1 capsule (400 mg total) by mouth 3 (three) times daily.     methocarbamoL 500 MG Tab  Commonly known as: ROBAXIN  Take 1 tablet (500 mg total) by mouth 4 (four) times daily as needed (headache).     metoclopramide HCl 10 MG tablet  Commonly known as: REGLAN  Take 1 tablet (10 mg total) by mouth every 6 (six) hours as needed (for headache and nausea).            CHANGE how you take these medications      amLODIPine 10 MG tablet  Commonly known as: NORVASC  Take 1 tablet (10 mg total) by mouth once daily.  What changed:   medication strength  how much to take             CONTINUE taking these medications      NON FORMULARY MEDICATION  Bi-Est (50/50)/Prog/Test 4mg/100mg/1mg/ml Cream - APPLY 4 CLICKS TOPICALLY EVERY DAY AT BEDTIME AS DIRECTED              Pricila Ramírez, LENCHO, NP  Comprehensive Stroke Center  Department of Vascular Neurology   [facility] ([facility])

## 2023-12-17 NOTE — PROGRESS NOTES
[facility] ([facility])  Vascular Neurology  Comprehensive Stroke Center  Progress Note    Assessment/Plan:     * Nontraumatic subarachnoid hemorrhage  Laura Newell is a 58 y.o. female with a significant medical history of asthma who presents to the hospital as a transfer from [facility] for evaluation of SAH.  She was hypertensive on presentation to the OSH ED, 200s/100s. CT with perimesencephalic SAH. CTA negative for aneurysm or other cerebrovascular abnormalities. TTE EF 65%, no LAE, normal wall thickness noted. DSA unremarkable for vascular abnormalities.     Repeat CTA (day 7) :  with NO  vascular abnormalities. Patient doing good , reported neck pain and spasm but medications helps. Plan for MRI for more details. Initial CTH with questionable hypodensity in the R MCA juxta cortical area.   Will schedule follow up with MS clinic for further evaluation       Antithrombotics for secondary stroke prevention: Antiplatelets: None: Intracerebral Hemorrhage    Statins for secondary stroke prevention and hyperlipidemia, if present:   Statins: None: Reason: SAH    Aggressive risk factor modification: None     Rehab efforts: The patient has been evaluated by a stroke team provider and the therapy needs have been fully considered based off the presenting complaints and exam findings. The following therapy evaluations are needed: PT evaluate and treat, OT evaluate and treat    Diagnostics ordered/pending: Other: Daily TCDs    VTE prophylaxis: Enoxaparin 40 mg SQ every 24 hours  Mechanical prophylaxis: Place SCDs    BP parameters: SAH: SBP<140        Cephalalgia  On scheduled gabapentin and robaxin, prn tylenol and oxy  On scheduled tylenol 1000 mg TID qh  Will adjust medications further as needed    Elevated cholesterol  -Stroke risk factor.  Noted in the patient's record as of 6/2020.  Patient not currently prescribed statin.  -Lipid panel WNL  -statin not indicated    Essential  hypertension  -Stroke risk factor.  Noted in the patient's record as of 8/2019.  Patient not currently prescribed medications.  -Initial SBP 200s.  -SBP <140  -Initially on cardene, currently off at this time  -Now on  amlodipine 10 mg daily, increased from 5 mg daily   -PRN labetalol  -Monitor for need to d/c on home BP meds    MRI with significant MV changes with no hx of hypertension.   -Follow up with MS clinic for further work up         12/9/23 patient reports improvement in HA, rates pain 2/10, persistent photophobia, exam nonfocal, CTA negative for aneurysm or other cerebrovascular abnormality, DSA pending, TCD pending  12/12/23: NAEON. Neuro exam stable. Patient continues to have intermittent headaches, neck pain, and nausea. Patient reports relief with zofran. Tylenol was given for headache and neck pain. Daily TCDs in progress, no evidence of vasospasms noted for today's TCD. Patient stable for NPU stepdown.  12/13/23: NAEON. Neuro exam stable. Patient continues to have persistent photophobia and headaches. TCDs stable. Patient pending NPU stepdown.  12/14/23: HA more uncontrolled today with new phonophobia, STAT CTH pending, increasing amlodipine, adjusting HA medication regimen, repeating CTA tomorrow  12/15/23: HA exacerbated overnight, improved this AM.  Reglan, standing tylenol, and increasing GBP helped.  Pending CTA.  12/16: patient doing good, neck/head pain controlled with medicaiton regimen. Plan for MRI today.  12/17: MRI with small foci DR in the R cerebellum and R BG with T2/FLAIR Significant WM changes. Patient follow up with MS clinic.     STROKE DOCUMENTATION        NIH Scale:          Modified [place] Score: 0  [place] Coma Scale:    ABCD2 Score:    [id number] Score:   HAS -BLED Score:   ICH Score:0  Hunt & Bravo Classification:Grade I     Hemorrhagic change of an Ischemic Stroke: Does this patient have an ischemic stroke with hemorrhagic changes? No     No new subjective & objective note  has been filed under this hospital service since the last note was generated.      Nanda Ribeiro MD  Comprehensive Stroke Center  Department of Vascular Neurology   [facility] Neurosurgery [facility])

## 2023-12-17 NOTE — PROGRESS NOTES
[facility] - Neurosurgery ([facility])  Vascular Neurology  Comprehensive Stroke Center  Progress Note    Assessment/Plan:     * Nontraumatic subarachnoid hemorrhage  Laura Newell is a 58 y.o. female with a significant medical history of asthma who presents to the hospital as a transfer from [facility] for evaluation of SAH.  She was hypertensive on presentation to the OSH ED, 200s/100s. CT with perimesencephalic SAH. CTA negative for aneurysm or other cerebrovascular abnormalities. TTE EF 65%, no LAE, normal wall thickness noted. DSA unremarkable for vascular abnormalities. Followup CTA on day 7 post-bleed negative for aneurysm or other vascular lesion, however CTH suspicious for patchy juxtacortical hypodensities.  MRI brain pending.          Antithrombotics for secondary stroke prevention: Antiplatelets: None: Intracerebral Hemorrhage    Statins for secondary stroke prevention and hyperlipidemia, if present:   Statins: None: Reason: SAH    Aggressive risk factor modification: None     Rehab efforts: no rehab needs    Diagnostics ordered/pending: MRI brain    VTE prophylaxis: Enoxaparin 40 mg SQ every 24 hours  Mechanical prophylaxis: Place SCDs    BP parameters: SAH: SBP<140        Cephalalgia  Gabapentin, tylenol, reglan for HA    Elevated cholesterol  -Stroke risk factor.  Noted in the patient's record as of 6/2020.  Patient not currently prescribed statin.  -Lipid panel WNL  -statin not indicated    Essential hypertension  -Stroke risk factor.  Noted in the patient's record as of 8/2019.  Patient not currently prescribed medications.  -Initial SBP 200s.  -SBP <140  -Initially on cardene, currently off at this time  -on amlodipine 5 mg daily, increased to 10 mg today  -PRN labetalol  -Monitor for need to d/c on home BP meds         12/9/23 patient reports improvement in HA, rates pain 2/10, persistent photophobia, exam nonfocal, CTA negative for aneurysm or other cerebrovascular abnormality,  DSA pending, TCD pending  12/12/23: NAEON. Neuro exam stable. Patient continues to have intermittent headaches, neck pain, and nausea. Patient reports relief with zofran. Tylenol was given for headache and neck pain. Daily TCDs in progress, no evidence of vasospasms noted for today's TCD. Patient stable for NPU stepdown.  12/13/23: NAEON. Neuro exam stable. Patient continues to have persistent photophobia and headaches. TCDs stable. Patient pending NPU stepdown.  12/14/23: HA more uncontrolled today with new phonophobia, STAT CTH pending, increasing amlodipine, adjusting HA medication regimen, repeating CTA tomorrow  12/15/23: HA exacerbated overnight, improved this AM.  Reglan, standing tylenol, and increasing GBP helped.  Pending CTA.    STROKE DOCUMENTATION        NIH Scale:          Modified [place] Score: 0  Balbir Coma Scale:    ABCD2 Score:    [id number] Score:   HAS -BLED Score:   ICH Score:0  Hunt & Bravo Classification:Grade I     Hemorrhagic change of an Ischemic Stroke: Does this patient have an ischemic stroke with hemorrhagic changes? No     Neurologic Chief Complaint: HA    Subjective:     Interval History: HA remains controlled.  MRI pending.    HPI, Past Medical, Family, and Social History remains the same as documented in the initial encounter.     Review of Systems   Neurological:  Positive for headaches.     Scheduled Meds:   acetaminophen  1,000 mg Oral Q8H    amLODIPine  10 mg Oral Daily    enoxparin  40 mg Subcutaneous Q24H (prophylaxis, 1700)    gabapentin  400 mg Oral TID    methocarbamoL  750 mg Oral QID     Continuous Infusions:  PRN Meds:labetalol, melatonin, metoclopramide, ondansetron, oxyCODONE    Objective:     Vital Signs (Most Recent):  Temp: 97.5 °F (36.4 °C) (12/16/23 1940)  Pulse: 89 (12/16/23 1940)  Resp: 18 (12/16/23 1940)  BP: 139/70 (12/16/23 1940)  SpO2: 98 % (12/16/23 1940)  BP Location: Left arm    Vital Signs Range (Last 24H):  Temp:  [97.5 °F (36.4 °C)-98.4 °F (36.9  °C)]   Pulse:  [68-89]   Resp:  [16-18]   BP: (104-143)/(70-82)   SpO2:  [97 %-99 %]   BP Location: Left arm       Physical Exam  Constitutional:       General: She is not in acute distress.  HENT:      Head: Normocephalic and atraumatic.      Mouth/Throat:      Mouth: Mucous membranes are moist.      Pharynx: Oropharynx is clear.   Eyes:      Extraocular Movements: Extraocular movements intact.      Pupils: Pupils are equal, round, and reactive to light.   Cardiovascular:      Rate and Rhythm: Normal rate and regular rhythm.   Pulmonary:      Effort: Pulmonary effort is normal.      Breath sounds: Normal breath sounds.   Musculoskeletal:         General: No swelling. Normal range of motion.   Neurological:      Comments: MS: A&XO3, speech fluent, follows commands, no neglect  CN: PERRL, EOMI, sensation intact, face symmetric, no dysarthria  Motor: no arm or leg drift  Sens: intact to LT  Coord: no ataxia on finger to nose                      Laboratory:  CMP:   Recent Labs   Lab 12/16/23  0348   CALCIUM 9.2   ALBUMIN 3.4*   PROT 6.5      K 4.2   CO2 25      BUN 13   CREATININE 1.0   ALKPHOS 82   ALT 41   AST 40   BILITOT 0.3       BMP:   Recent Labs   Lab 12/16/23  0348      K 4.2      CO2 25   BUN 13   CREATININE 1.0   CALCIUM 9.2       CBC:   Recent Labs   Lab 12/16/23  0348   WBC 4.20   RBC 4.21   HGB 12.7   HCT 38.0      MCV 90   MCH 30.2   MCHC 33.4         Diagnostic Results   12/16/23:  CTA head: Unremarkable CTA of the head specifically without evidence for proximal significant stenosis or definite intracranial aneurysm.   CTH: patchy subcortical hypodensities (my read)    Carol Goodwin MD  Comprehensive Stroke Center  Department of Vascular Neurology   [facility])

## 2023-12-17 NOTE — PLAN OF CARE
Problem: Adult Inpatient Plan of Care  Goal: Optimal Comfort and Wellbeing  Outcome: Ongoing, Progressing     Problem: Adjustment to Illness (Stroke, Hemorrhagic)  Goal: Optimal Coping  Outcome: Ongoing, Progressing     Problem: Pain (Stroke, Hemorrhagic)  Goal: Acceptable Pain Control  Outcome: Ongoing, Progressing     Problem: Swallowing Impairment (Stroke, Hemorrhagic)  Goal: Optimal Eating and Swallowing Without Aspiration  Outcome: Ongoing, Progressing     Problem: Urinary Elimination Impaired (Stroke, Hemorrhagic)  Goal: Effective Urinary Elimination  Outcome: Ongoing, Progressing

## 2023-12-17 NOTE — NURSING
Nurses Note -- 4 Eyes      12/17/2023   9:25 AM      Skin assessed during: Q Shift Change      [x] No Altered Skin Integrity Present    []Prevention Measures Documented      [] Yes- Altered Skin Integrity Present or Discovered   [] LDA Added if Not in Epic (Describe Wound)   [] New Altered Skin Integrity was Present on Admit and Documented in LDA   [] Wound Image Taken    Wound Care Consulted? No    Attending Nurse:  Lauren Roberson RN/Staff Member:  ISAIAS Tay

## 2023-12-17 NOTE — SUBJECTIVE & OBJECTIVE
Neurologic Chief Complaint: HA    Subjective:     Interval History: HA remains controlled.  MRI pending.    HPI, Past Medical, Family, and Social History remains the same as documented in the initial encounter.     Review of Systems   Neurological:  Positive for headaches.     Scheduled Meds:   acetaminophen  1,000 mg Oral Q8H    amLODIPine  10 mg Oral Daily    enoxparin  40 mg Subcutaneous Q24H (prophylaxis, 1700)    gabapentin  400 mg Oral TID    methocarbamoL  750 mg Oral QID     Continuous Infusions:  PRN Meds:labetalol, melatonin, metoclopramide, ondansetron, oxyCODONE    Objective:     Vital Signs (Most Recent):  Temp: 97.5 °F (36.4 °C) (12/16/23 1940)  Pulse: 89 (12/16/23 1940)  Resp: 18 (12/16/23 1940)  BP: 139/70 (12/16/23 1940)  SpO2: 98 % (12/16/23 1940)  BP Location: Left arm    Vital Signs Range (Last 24H):  Temp:  [97.5 °F (36.4 °C)-98.4 °F (36.9 °C)]   Pulse:  [68-89]   Resp:  [16-18]   BP: (104-143)/(70-82)   SpO2:  [97 %-99 %]   BP Location: Left arm       Physical Exam  Constitutional:       General: She is not in acute distress.  HENT:      Head: Normocephalic and atraumatic.      Mouth/Throat:      Mouth: Mucous membranes are moist.      Pharynx: Oropharynx is clear.   Eyes:      Extraocular Movements: Extraocular movements intact.      Pupils: Pupils are equal, round, and reactive to light.   Cardiovascular:      Rate and Rhythm: Normal rate and regular rhythm.   Pulmonary:      Effort: Pulmonary effort is normal.      Breath sounds: Normal breath sounds.   Musculoskeletal:         General: No swelling. Normal range of motion.   Neurological:      Comments: MS: A&XO3, speech fluent, follows commands, no neglect  CN: PERRL, EOMI, sensation intact, face symmetric, no dysarthria  Motor: no arm or leg drift  Sens: intact to LT  Coord: no ataxia on finger to nose                      Laboratory:  CMP:   Recent Labs   Lab 12/16/23  0348   CALCIUM 9.2   ALBUMIN 3.4*   PROT 6.5      K 4.2   CO2 25       BUN 13   CREATININE 1.0   ALKPHOS 82   ALT 41   AST 40   BILITOT 0.3       BMP:   Recent Labs   Lab 12/16/23  0348      K 4.2      CO2 25   BUN 13   CREATININE 1.0   CALCIUM 9.2       CBC:   Recent Labs   Lab 12/16/23  0348   WBC 4.20   RBC 4.21   HGB 12.7   HCT 38.0      MCV 90   MCH 30.2   MCHC 33.4         Diagnostic Results   12/16/23:  CTA head: Unremarkable CTA of the head specifically without evidence for proximal significant stenosis or definite intracranial aneurysm.   CTH: patchy subcortical hypodensities (my read)

## 2023-12-17 NOTE — NURSING
IV removed with tip intact, gauze and tape applied. Discharge instructions given to and discussed with pt, questions and concerns addressed.

## 2023-12-18 ENCOUNTER — TELEPHONE (OUTPATIENT)
Dept: NEUROSURGERY | Facility: CLINIC | Age: 58
End: 2023-12-18
Payer: COMMERCIAL

## 2023-12-18 DIAGNOSIS — I60.9 SAH (SUBARACHNOID HEMORRHAGE): Primary | ICD-10-CM

## 2023-12-20 NOTE — PHYSICIAN QUERY
PT Name: Laura Newell  MR #: [id number]    DOCUMENTATION CLARIFICATION     CDS/: Nithya Esparza, RN, CDS               Contact information: [email or web address]     This form is a permanent document in the medical record.     Query Date: December 20, 2023    By submitting this query, we are merely seeking further clarification of documentation. Please utilize your independent clinical judgment when addressing the question(s) below.    The Medical Record contains the following:  Clinical Findings Location in Medical Record   --NEUROLOGIC: AAO x 4; speaking and following commands appropriately. Equal strength in all extremities; denies numbness/tingling. +bifocal use. No dysarthria or aphasia. +occipital HA     --Subarachnoid hemorrhage           -She was hypertensive on presentation to the OSH ED, 200s/100s.           -NIH 0          - GCS 15    --Nontraumatic subarachnoid hemorrhage        - DSA completed 12/11, also negative for underlying vascular malformation.         -Will d/c keppra and nimodipine (no data to support use in perimesencephalic SAH), c/w daily TCDs per NSGY recs. Remains neuro intact on exam, reports MANCIA markedly improved this AM.     --MRI with significant Microvascular changes with no significant history og hypertension   ---Headache due to meningeal irritation (improving) > will dc on scheduled tylenol and gabapentin + can do PRN Reglan and methocarbamol .   --Resolved problems:          -Vasogenic Cerebral Edema   ED RN note 12/8 (ISAIAS Hu)        Vas neuro c/s 12/8 (Dr Goodwin/Desiree, NP)        NCC Note 12/13 (Dr Bearden/Samantha)          Discharge Summary   12/17 (Dr Goodwin/Gema)     --No acute territorial infarct.  Prominent areas of decreased attenuation within the periventricular and subcortical white matter again noted nonspecific but may reflect advanced chronic small vessel ischemic change or other leukoencephalopathy, for clinical correlation as the appearance is  somewhat atypical for more typically visualized chronic small vessel ischemic change     --Punctate size foci of signal abnormality right basal ganglia and right superior cerebellum concerning for recent infarcts.  No significant mass effect or hemorrhagic conversion. No midline shift or significant mass effect.      CTH 12/8            Brain MRI 12/17     For reporting purposes, the definition for other diagnoses is interpreted as additional conditions that affect patient care in terms of requiring: clinical evaluation; or therapeutic treatment; or diagnostic procedures; or extended length of hospital stay; or increased nursing care and/or monitoring. (ICD-10-CM Official Guidelines for Coding and Reporting FY 2021)     After study, the diagnosis of cerebral edema is:  [   ] Clinically significant diagnosis that was monitored and/or treated as follows (please specify): ___________   [   ] Present but was inherent to the stroke   [  x ] Other explanation (please specify): not present   [  ] Clinically Undetermined       Please document in your progress notes daily for the duration of treatment until resolved, and include in your discharge summary.    Reference:  ICD-10-CM Official Guidelines for Coding and Reporting FY 2021. (2020). Retrieved April 7, 2021, from https://www.cdc.gov/nchs/data/icd/10cmguidelines-FY2021.pdf?fbclid=[email or web address]       Form No. 91217

## 2023-12-21 ENCOUNTER — PATIENT OUTREACH (OUTPATIENT)
Dept: ADMINISTRATIVE | Facility: CLINIC | Age: 58
End: 2023-12-21
Payer: COMMERCIAL

## 2023-12-21 NOTE — PROGRESS NOTES
C3 nurse spoke with Laura Newell for a TCC post hospital discharge follow up call. The patient reports does not have a scheduled HOSFU appointment. C3 nurse was unable to schedule HOSFU appointment for Non[facility] PCP. Patient advised to contact their PCP to schedule a HOSPFU within 5-7 days.      Patient declines NP home referral.

## 2024-01-02 ENCOUNTER — TELEPHONE (OUTPATIENT)
Dept: NEUROLOGY | Facility: HOSPITAL | Age: 59
End: 2024-01-02
Payer: COMMERCIAL

## 2024-01-12 ENCOUNTER — TELEPHONE (OUTPATIENT)
Dept: NEUROLOGY | Facility: CLINIC | Age: 59
End: 2024-01-12
Payer: COMMERCIAL

## 2024-01-12 ENCOUNTER — TELEPHONE (OUTPATIENT)
Dept: NEUROSURGERY | Facility: CLINIC | Age: 59
End: 2024-01-12
Payer: COMMERCIAL

## 2024-01-12 NOTE — TELEPHONE ENCOUNTER
Spoke w/ pt. Advised pt that I spoke with provider and she stated the pt was okay to return to work. Advised pt not to engage in any strenuous activities, cardio exercise, or activities that could result in a fall. Pt confirmed.     ----- Message from Damaris Siegel sent at 1/12/2024 11:50 AM CST -----  Regarding: pt advice  Contact: pt @ [phone]  Pt is wanting to be advised as to what activities that she can and cannot do until she is seen by provider. Please call to further advise. Thank you for all you are doing.

## 2024-01-12 NOTE — TELEPHONE ENCOUNTER
----- Message from Damaris Siegel sent at 1/12/2024 11:42 AM CST -----  Regarding: appt needed  Contact: pt @ [phone]  Caller: Laura     Reason for call: Is calling to be scheduled from a referral for I60.9 (ICD-10-CM) - Nontraumatic subarachnoid hemorrhage.   Please call to advise further. Thank you for all you are doing.

## 2024-01-17 ENCOUNTER — TELEPHONE (OUTPATIENT)
Dept: NEUROSURGERY | Facility: CLINIC | Age: 59
End: 2024-01-17
Payer: COMMERCIAL

## 2024-01-17 NOTE — TELEPHONE ENCOUNTER
"LVM. Advised pt that I spoke w/ provider and it is okay to return to work as long as she doesn't engage in any strenuous activities, cardio exercises or activities that could result in a fall. Advised pt that appt on 2/1 is to discuss CTA results.     ----- Message from Alejandra Mojica sent at 1/17/2024 10:05 AM CST -----  Consult/Advisory:      Name Of Caller: Self    Contact Preference:   [phone]       What is the nature of the call?: Pt called regarding appt 2/1,  pt has questions  as to when she can return to work. Pt was previously scheduled to returned on 1/24, however has a up coming appt on 2/1  pt needs clarifications moving  forward, Please Call         Additional Notes:  "Thank you for all that you do for our patients"          "

## 2024-01-24 ENCOUNTER — TELEPHONE (OUTPATIENT)
Dept: NEUROSURGERY | Facility: CLINIC | Age: 59
End: 2024-01-24
Payer: COMMERCIAL

## 2024-01-24 DIAGNOSIS — I60.9 SAH (SUBARACHNOID HEMORRHAGE): Primary | ICD-10-CM

## 2024-01-24 NOTE — TELEPHONE ENCOUNTER
----- Message from Yesi Garcia sent at 1/24/2024  1:40 PM CST -----  Regarding: Orders  Contact: [phone]  Radiology clinic is calling stating needing orders for pt before pt can have CT some please call [phone]

## 2024-01-24 NOTE — TELEPHONE ENCOUNTER
[facility] radiology requesting BMP or CMP for pts scan. Order placed.

## 2024-02-01 ENCOUNTER — TELEPHONE (OUTPATIENT)
Dept: NEUROLOGY | Facility: CLINIC | Age: 59
End: 2024-02-01

## 2024-02-01 ENCOUNTER — OFFICE VISIT (OUTPATIENT)
Dept: NEUROSURGERY | Facility: CLINIC | Age: 59
End: 2024-02-01
Payer: COMMERCIAL

## 2024-02-01 VITALS
DIASTOLIC BLOOD PRESSURE: 84 MMHG | WEIGHT: 136.44 LBS | SYSTOLIC BLOOD PRESSURE: 122 MMHG | HEIGHT: 64 IN | BODY MASS INDEX: 23.29 KG/M2 | HEART RATE: 76 BPM

## 2024-02-01 DIAGNOSIS — I60.9 SUBARACHNOID HEMORRHAGE: Primary | ICD-10-CM

## 2024-02-01 PROCEDURE — 3074F SYST BP LT 130 MM HG: CPT | Mod: CPTII,S$GLB,, | Performed by: STUDENT IN AN ORGANIZED HEALTH CARE EDUCATION/TRAINING PROGRAM

## 2024-02-01 PROCEDURE — 1159F MED LIST DOCD IN RCRD: CPT | Mod: CPTII,S$GLB,, | Performed by: STUDENT IN AN ORGANIZED HEALTH CARE EDUCATION/TRAINING PROGRAM

## 2024-02-01 PROCEDURE — 3079F DIAST BP 80-89 MM HG: CPT | Mod: CPTII,S$GLB,, | Performed by: STUDENT IN AN ORGANIZED HEALTH CARE EDUCATION/TRAINING PROGRAM

## 2024-02-01 PROCEDURE — 99215 OFFICE O/P EST HI 40 MIN: CPT | Mod: S$GLB,,, | Performed by: STUDENT IN AN ORGANIZED HEALTH CARE EDUCATION/TRAINING PROGRAM

## 2024-02-01 PROCEDURE — 3008F BODY MASS INDEX DOCD: CPT | Mod: CPTII,S$GLB,, | Performed by: STUDENT IN AN ORGANIZED HEALTH CARE EDUCATION/TRAINING PROGRAM

## 2024-02-01 PROCEDURE — 99999 PR PBB SHADOW E&M-EST. PATIENT-LVL III: CPT | Mod: PBBFAC,,, | Performed by: STUDENT IN AN ORGANIZED HEALTH CARE EDUCATION/TRAINING PROGRAM

## 2024-02-01 NOTE — TELEPHONE ENCOUNTER
Patient seen by NSGY today and scheduled for hospital follow up with Vascular neurology. Patient scheduled 2/06 with Dr. Goodwin.

## 2024-02-01 NOTE — PROGRESS NOTES
Neurosurgery  History & Physical    SUBJECTIVE:     Chief Complaint: Hospital follow up for subarachnoid hemorrhage    History of Present Illness:  Laura Newell is a 58 year old woman with PMHx HTN and asthma who initially presented 12/18/23 with acute onset of headache during exercise that morning. She was found to have prepontine subarachnoid hemorrhage, HH2F2 in the setting of systolic blood pressure elevated into the 200s. She underwent cerebral angiogram on 12/11/23 which ruled out any aneurysm or AVM. Interval TCDs did not identify vasospasms. Her hospital course was otherwise unremarkable and she was discharged home.     She presents to clinic today for routine interval follow up after undergoing CTA head and neck which demonstrated resolution of the previously seen SAH. Since discharge she has been doing well. She takes her blood pressure regularly at home, has adhered to her prescribed amlodipine, and reports SBP readings WNL. She further elaborated that prior to her initial presentation she had been coping with the acute stress of taking care of her elderly father who passed away shortly thereafter. She identifies acute stress as a contributing factor and feels her stress level has been more manageable lately.      Review of patient's allergies indicates:   Allergen Reactions    Erythromycin     Pcn [penicillins]        Current Outpatient Medications   Medication Sig Dispense Refill    acetaminophen (TYLENOL) 500 MG tablet Take 2 tablets (1,000 mg total) by mouth every 6 (six) hours as needed for Pain (headache). 30 tablet 0    amLODIPine (NORVASC) 10 MG tablet Take 1 tablet (10 mg total) by mouth once daily. 30 tablet 1    famotidine (PEPCID) 20 MG tablet Take 20 mg by mouth 2 (two) times daily.      theanine 200 mg Cap Take by mouth.      cholecalciferol, vitamin D3, (VITAMIN D3) 50 mcg (2,000 unit) Tab Take by mouth once daily.      methocarbamoL (ROBAXIN) 500 MG Tab Take 1 tablet (500 mg total) by  mouth 4 (four) times daily as needed (headache). (Patient not taking: Reported on 2/1/2024) 30 tablet 1    NON FORMULARY MEDICATION Bi-Est (50/50)/Prog/Test 4mg/100mg/1mg/ml Cream - APPLY 4 CLICKS TOPICALLY EVERY DAY AT BEDTIME AS DIRECTED      pedi multivit 17/iron fumarate (FLINTSTONES PLUS IRON ORAL) Take by mouth.       No current facility-administered medications for this visit.       Past Medical History:   Diagnosis Date    Asthma     Lung collapse     Nontraumatic subarachnoid hemorrhage 12/08/2023    Pneumothorax     spontaneous     Past Surgical History:   Procedure Laterality Date    APPENDECTOMY      bilateral groin hernia repair      CEREBRAL ANGIOGRAM N/A 12/10/2023    Procedure: ANGIOGRAM-CEREBRAL; Need Anesthesia; Dr. Byron Iniguez;  Surgeon: Dina Mcnulty;  [facility];  Service: Anesthesiology;  Laterality: N/A;  Need Anesthesia     Family History       Problem Relation (Age of Onset)    Asthma Father    Bipolar disorder Sister    Cataracts Mother, Father    Chronic fatigue Sister    Diabetes Mother, Father    Heart disease Father    Hyperlipidemia Sister    Hypertension Mother, Father    Progressive Supranuclear Palsy Mother    Pulmonary embolism Mother    Stroke Mother          Social History     Socioeconomic History    Marital status:     Number of children: 2   Occupational History     Employer: [employer]   Tobacco Use    Smoking status: Never    Smokeless tobacco: Never   Substance and Sexual Activity    Alcohol use: No    Drug use: No    Sexual activity: Yes     Partners: Male     Social Determinants of Health     Financial Resource Strain: Low Risk  (12/11/2023)    Overall Financial Resource Strain (CARDIA)     Difficulty of Paying Living Expenses: Not hard at all   Food Insecurity: No Food Insecurity (12/11/2023)    Hunger Vital Sign     Worried About Running Out of Food in the Last Year: Never true     Ran Out of Food in the Last Year: Never true   Transportation  "Needs: No Transportation Needs (12/11/2023)    PRAPARE - Transportation     Lack of Transportation (Medical): No     Lack of Transportation (Non-Medical): No   Physical Activity: Sufficiently Active (12/11/2023)    Exercise Vital Sign     Days of Exercise per Week: 4 days     Minutes of Exercise per Session: 60 min   Stress: Stress Concern Present (12/11/2023)    [place] [employer] - Occupational Stress Questionnaire     Feeling of Stress : To some extent   Social Connections: Socially Isolated (12/11/2023)    Social Connection and Isolation Panel [NHANES]     Frequency of Communication with Friends and Family: Never     Frequency of Social Gatherings with Friends and Family: Never     Attends Oriental orthodox Services: Never     Active Member of Clubs or Organizations: No     Attends Club or Organization Meetings: Never     Marital Status:    Housing Stability: Low Risk  (12/11/2023)    Housing Stability Vital Sign     Unable to Pay for Housing in the Last Year: No     Number of Places Lived in the Last Year: 1     Unstable Housing in the Last Year: No       Review of Systems    OBJECTIVE:     Vital Signs  Pulse: 76  BP: 122/84  Pain Score: 0-No pain  Height: 5' 4" (162.6 cm)  Weight: 61.9 kg (136 lb 7.4 oz)  Body mass index is 23.42 kg/m².      Neurosurgery Physical Exam  General: well developed, well nourished, no distress.   Head: normocephalic, atraumatic  Mental Status: Awake, Alert, Oriented  Speech: Clear with content appropriate to conversation  Cranial nerves: face symmetric, CN II-XII grossly intact.   Eyes: pupils equal, round, reactive to light, EOMI.  Sensory: intact to light touch throughout    Motor Strength: Moves all extremities spontaneously with good tone.  Full strength upper and lower extremities. No abnormal movements seen.     Pronator Drift: no drift noted  Finger-to-nose: Intact bilaterally    Gait steady with normal stride and arm-swing    Diagnostic Results:  1/29/24 " CTA Head and Neck  - Resolution of the previously seen intracranial hemorrhage. Scattered areas of low attenuation bilaterally in the cerebral white matter. Stable hypoplastic L A1 segment. No other vascular abnormalities seen.     I have personally reviewed the above referenced imaging.     ASSESSMENT/PLAN:     Laura Newell is a 58 year old woman with PMHx HTN and asthma who initially presented 12/18/23 with acute onset of headache during exercise that morning. She was found to have prepontine subarachnoid hemorrhage, HH2F2 in the setting of systolic blood pressure elevated into the 200s. She underwent cerebral angiogram on 12/11/23 which ruled out any aneurysm or AVM. Interval TCDs did not identify vasospasms. Her hospital course was otherwise unremarkable.     She presents today for routine follow up. Interval CTA head and neck done on 1/29 shows resolution of her SAH and is otherwise WNL. We discussed that because no contributing cerebrovascular pathology was identified and her intracranial hemorrhage is now resolved, no further neurosurgical follow up is required. However, we will be available as needed or with any questions. She did additionally request a referral to Neurology for the finding of scattered bilateral areas of low attenuation in the cerebral white matter. We additionally discussed alarm symptoms that should prompt a visit to the ED. She knows she may reach out as needed or with any questions.       Radha Tanner PA-C  Neurosurgery Department

## 2024-02-06 ENCOUNTER — OFFICE VISIT (OUTPATIENT)
Dept: NEUROLOGY | Facility: CLINIC | Age: 59
End: 2024-02-06
Payer: COMMERCIAL

## 2024-02-06 VITALS — DIASTOLIC BLOOD PRESSURE: 81 MMHG | HEART RATE: 80 BPM | SYSTOLIC BLOOD PRESSURE: 123 MMHG

## 2024-02-06 DIAGNOSIS — I61.9 CEREBRAL BRAIN HEMORRHAGE: ICD-10-CM

## 2024-02-06 DIAGNOSIS — I10 ESSENTIAL HYPERTENSION: ICD-10-CM

## 2024-02-06 DIAGNOSIS — R06.83 SNORING: ICD-10-CM

## 2024-02-06 DIAGNOSIS — I60.9 NONTRAUMATIC SUBARACHNOID HEMORRHAGE: Primary | ICD-10-CM

## 2024-02-06 DIAGNOSIS — Z76.89 ESTABLISHING CARE WITH NEW DOCTOR, ENCOUNTER FOR: ICD-10-CM

## 2024-02-06 PROCEDURE — 3079F DIAST BP 80-89 MM HG: CPT | Mod: CPTII,S$GLB,, | Performed by: PSYCHIATRY & NEUROLOGY

## 2024-02-06 PROCEDURE — 99999 PR PBB SHADOW E&M-EST. PATIENT-LVL IV: CPT | Mod: PBBFAC,,, | Performed by: PSYCHIATRY & NEUROLOGY

## 2024-02-06 PROCEDURE — 1159F MED LIST DOCD IN RCRD: CPT | Mod: CPTII,S$GLB,, | Performed by: PSYCHIATRY & NEUROLOGY

## 2024-02-06 PROCEDURE — 99215 OFFICE O/P EST HI 40 MIN: CPT | Mod: S$GLB,,, | Performed by: PSYCHIATRY & NEUROLOGY

## 2024-02-06 PROCEDURE — 3074F SYST BP LT 130 MM HG: CPT | Mod: CPTII,S$GLB,, | Performed by: PSYCHIATRY & NEUROLOGY

## 2024-02-06 NOTE — PROGRESS NOTES
Vascular Neurology  Clinic Note    Reason For Visit (Chief Complaint): SAH followup    HPI: 58 y.o. right handed woman with PMH migraine w/ aura, asthma, with recent admission for perimesencephalic SAH.  Here for followup after hospitalization.    Patient presented to [facility] ED with severe HA that developed while working out.  BP to 200 systolic on arrival to ED.  CTH showed hemorrhage along R side of katiuska.  No aneurysm seen on CTA.  NIHSS 0, HH 2.  She was transferred to [facility] for angiogram, which was negative for aneurysm and other vascular anomalies.  She continued to have HA and neck pain.  MRI showed moderate amount of white matter FLAIR hyperintensities but no explanation for hemorrhage.  She was discharged on HD 7.      She saw neurosurgery as outpatient and had repeat CTA on 1/29.  Taking tylenol 1-2x/week for headaches.  Has started doing more activities.  Fatigue improving.  Was sleeping a lot after hospital discharge.    Migraines w/ aura started in 20s.  Became worse in her 30s.  Went a way after menopause.  Got relief from DHE/Migranal.  Failed Topamax, Imitrex, ubrogepant.      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     DSA 12/10/23:  Six vessel cerebral angiogram showed no significant abnormalities to account for patient's subarachnoid hemorrhage.  No aneurysm, arteriovenous malformation or AV fistula.       Cardiac Evaluation:  TTE 12/9/23:  LVEF 65%  NL LA size        Relevant Labwork:  Recent Labs   Lab 12/08/23  1403   Hemoglobin A1C 5.4   LDL Cholesterol 128.4   HDL 46   Triglycerides 33   Cholesterol 181       I independently viewed the above imaging studies in addition to reviewing the report.  I reviewed the above labwork.    Review of Systems  Msk: negative for muscle pain  Skin: negative for pruritis  Neuro: + headache  All others negative    Past Medical History  Past  Medical History:   Diagnosis Date    Asthma     Lung collapse     Nontraumatic subarachnoid hemorrhage 12/08/2023    Pneumothorax     spontaneous     Family History  Parents and sister have LAUREN.  Mother and sister have migraines.  Mother has PSP.  Dad had NPH.  No FH strokes.    Social History  Nurse .  Critical care background RN.      Medication List with Changes/Refills   Current Medications    ACETAMINOPHEN (TYLENOL) 500 MG TABLET    Take 2 tablets (1,000 mg total) by mouth every 6 (six) hours as needed for Pain (headache).    AMLODIPINE (NORVASC) 10 MG TABLET    Take 1 tablet (10 mg total) by mouth once daily.    CHOLECALCIFEROL, VITAMIN D3, (VITAMIN D3) 50 MCG (2,000 UNIT) TAB    Take by mouth once daily.    FAMOTIDINE (PEPCID) 20 MG TABLET    Take 20 mg by mouth 2 (two) times daily.    METHOCARBAMOL (ROBAXIN) 500 MG TAB    Take 1 tablet (500 mg total) by mouth 4 (four) times daily as needed (headache).    NON FORMULARY MEDICATION    Bi-Est (50/50)/Prog/Test 4mg/100mg/1mg/ml Cream - APPLY 4 CLICKS TOPICALLY EVERY DAY AT BEDTIME AS DIRECTED    PEDI MULTIVIT 17/IRON FUMARATE (FLINTSTONES PLUS IRON ORAL)    Take by mouth.    THEANINE 200 MG CAP    Take by mouth.       EXAM  Vital Signs:Blood pressure 123/81, pulse 80.  General: well appearing without discomfort   Neck: no carotid bruits  CV: RRR, nL S1&S2  Resp: breathing comfortably, no wheezing  Ext: wwp, no pedal edema    Mental Status: Alert and oriented, normal attention, speech fluent and prosodic, naming and repetition intact, follows multistep embedded commands, no e/o neglect or extinction  Cranial Nerves: PERRL, EOMI, VFF, sensation intact, face symmetric, TUP midline, SCM/trap 5/5  Motor: Normal bulk and tone, no drift, finger taps symmetric  Strength 5/5 throughout  Sensory: intact light touch bilaterally, intact proprioception bilaterally  Coordination: no ataxia on finger-to-nose or heel-to-shin testing; no truncal ataxia  Gait & Stance:  normal  DTR: 2+ symmetric        ___________________  ASSESSMENT & PLAN  58 y.o. right handed woman with PMH migraine w/ aura, asthma, with recent admission for perimesencephalic SAH.  Doing well with improved HA and would like to return to work next week.  No aneurysm or vascular malformation found on DSA, CTA and recent vessel imaging.  [place] of white matter disease appears excessive for migraines, and no significant vascular RF (mild HTN - recently started on low dose amlodipine).  Will refer to Neuro Immunology for fur there workup.    - Referral to sleep clinic for sleep study  - Referral to Neuro Immunology for white matter lesions  - Continue good blood pressure control.  Goal <130/80  - Tylenol PRN for HA  - Avoid estrogen replacement if possible, unless symptoms debilitating  - RTW paperwork completed  - RTC PRN      Problem List Items Addressed This Visit          Unprioritized    Essential hypertension    Nontraumatic subarachnoid hemorrhage - Primary     Other Visit Diagnoses       Cerebral brain hemorrhage        Snoring        Relevant Orders    Ambulatory referral/consult to Sleep Disorders    Establishing care with new doctor, encounter for        Relevant Orders    Ambulatory referral/consult to Internal Medicine            Carol Goodwin MD  Vascular Neurology

## 2024-02-07 PROBLEM — R51.9 CEPHALALGIA: Status: RESOLVED | Noted: 2023-12-10 | Resolved: 2024-02-07

## 2024-02-09 ENCOUNTER — TELEPHONE (OUTPATIENT)
Dept: NEUROSURGERY | Facility: CLINIC | Age: 59
End: 2024-02-09
Payer: COMMERCIAL

## 2024-02-09 NOTE — TELEPHONE ENCOUNTER
Spoke w/ pt. Advised pt that ppw has been signed and sent to disability desk. Pt v/u.     ----- Message from Tasha Garcia sent at 2/9/2024  1:49 PM CST -----  Regarding: FMLA Paperwork  Contact: [phone]  Laura Newell calling regarding Patient Advice (message) for # pt is calling to check on her FMLA paperwork please call pt to advise and update

## 2024-08-30 ENCOUNTER — PATIENT MESSAGE (OUTPATIENT)
Dept: NEUROLOGY | Facility: CLINIC | Age: 59
End: 2024-08-30
Payer: COMMERCIAL

## 2024-09-26 ENCOUNTER — OFFICE VISIT (OUTPATIENT)
Dept: NEUROLOGY | Facility: CLINIC | Age: 59
End: 2024-09-26
Payer: COMMERCIAL

## 2024-09-26 ENCOUNTER — TELEPHONE (OUTPATIENT)
Dept: NEUROLOGY | Facility: CLINIC | Age: 59
End: 2024-09-26

## 2024-09-26 VITALS
HEIGHT: 64 IN | DIASTOLIC BLOOD PRESSURE: 84 MMHG | HEART RATE: 92 BPM | WEIGHT: 144.19 LBS | BODY MASS INDEX: 24.62 KG/M2 | SYSTOLIC BLOOD PRESSURE: 124 MMHG | RESPIRATION RATE: 14 BRPM

## 2024-09-26 DIAGNOSIS — I60.9 SUBARACHNOID HEMORRHAGE: ICD-10-CM

## 2024-09-26 DIAGNOSIS — I10 ESSENTIAL HYPERTENSION: Primary | ICD-10-CM

## 2024-09-26 DIAGNOSIS — I60.9 SAH (SUBARACHNOID HEMORRHAGE): ICD-10-CM

## 2024-09-26 DIAGNOSIS — G43.109 MIGRAINE WITH AURA AND WITHOUT STATUS MIGRAINOSUS, NOT INTRACTABLE: ICD-10-CM

## 2024-09-26 DIAGNOSIS — I60.9 NONTRAUMATIC SUBARACHNOID HEMORRHAGE: ICD-10-CM

## 2024-09-26 PROCEDURE — 1159F MED LIST DOCD IN RCRD: CPT | Mod: CPTII,S$GLB,, | Performed by: PSYCHIATRY & NEUROLOGY

## 2024-09-26 PROCEDURE — 3008F BODY MASS INDEX DOCD: CPT | Mod: CPTII,S$GLB,, | Performed by: PSYCHIATRY & NEUROLOGY

## 2024-09-26 PROCEDURE — 99214 OFFICE O/P EST MOD 30 MIN: CPT | Mod: S$GLB,,, | Performed by: PSYCHIATRY & NEUROLOGY

## 2024-09-26 PROCEDURE — 3074F SYST BP LT 130 MM HG: CPT | Mod: CPTII,S$GLB,, | Performed by: PSYCHIATRY & NEUROLOGY

## 2024-09-26 PROCEDURE — 1160F RVW MEDS BY RX/DR IN RCRD: CPT | Mod: CPTII,S$GLB,, | Performed by: PSYCHIATRY & NEUROLOGY

## 2024-09-26 PROCEDURE — 3079F DIAST BP 80-89 MM HG: CPT | Mod: CPTII,S$GLB,, | Performed by: PSYCHIATRY & NEUROLOGY

## 2024-09-26 PROCEDURE — 99999 PR PBB SHADOW E&M-EST. PATIENT-LVL V: CPT | Mod: PBBFAC,,, | Performed by: PSYCHIATRY & NEUROLOGY

## 2024-09-26 PROCEDURE — 4010F ACE/ARB THERAPY RXD/TAKEN: CPT | Mod: CPTII,S$GLB,, | Performed by: PSYCHIATRY & NEUROLOGY

## 2024-09-26 RX ORDER — AMOXICILLIN 500 MG
2 CAPSULE ORAL DAILY
COMMUNITY

## 2024-09-26 NOTE — TELEPHONE ENCOUNTER
In-basket referral message sent to Dr.Lauren Goodwin's staff to contact patient to schedule follow up appointment.

## 2024-09-26 NOTE — PROGRESS NOTES
"      HPI: Laura Newell is a 59 y.o. female with headache     Last saw me in 2021    She is here for a hospital follow up    She suffered a perimesencephalic SAH in 2023 (had headache which occurred with working out)    She was hospitalized at [facility] for this    "DSA 12/10/23:  Six vessel cerebral angiogram showed no significant abnormalities to account for patient's subarachnoid hemorrhage.  No aneurysm, arteriovenous malformation or AV fistula. "    She followed up with Dr Goodwin since    The patient was referred and sleep clinic    She was having fatigue in the time closer the SAH.    This fatigue has resolved with stopping amlodipine prior    Never had sleep study     Dr Goodwin was reviewing her case with Neuroimmunology but patient has had no further appointments    BP is normal today    Her headaches are infrequent. She is currently using Tylenol which helps    Review of Systems   Constitutional:  Negative for fever.   HENT:  Negative for nosebleeds.    Eyes:  Negative for double vision.   Respiratory:  Negative for hemoptysis.    Cardiovascular:  Negative for leg swelling.   Gastrointestinal:  Negative for blood in stool.   Genitourinary:  Negative for hematuria.   Musculoskeletal:  Negative for falls.   Skin:  Negative for rash.   Neurological:  Positive for headaches.   Endo/Heme/Allergies:  Does not bruise/bleed easily.         I have reviewed all of this patient's past medical and surgical histories as well as family and social histories and active allergies and medications as documented in the electronic medical record.      Exam:  Gen Appearance, well developed/nourished in no apparent distress  CV: 2+ distal pulses with no edema or swelling  Neuro:  MS: Awake, alert, oriented to place, person, time, situation. Sustains attention. Recent/remote memory intact, Language is full to spontaneous speech/repetition/naming/comprehension. Fund of Knowledge is full  CN: Optic discs are flat with normal vasculature, " PERRL, Extraoccular movements and visual fields are full. Normal facial sensation and strength, Hearing symmetric, Tongue and Palate are midline and strong. Shoulder Shrug symmetric and strong.  Motor: Normal bulk, tone, no abnormal movements. 5/5 strength bilateral upper/lower extremities with 2+ reflexes   Sensory: symmetric to light touch, pain, temp, and vibration, Romberg negative  Cerebellar: Finger-nose,Heal-shin, Rapid alternating movements intact  Gait: Normal stance, no ataxia        Assessment/Plan: Laura Newell is a 59 y.o. female with a long history of migraine with aura. Previously this was intractable.    I recommend:   1. Avoid DHE/Migranal given history of SAH in 2024.   2. No relief with Toradol or Imitrex prior and had nausea with ubrogepant prior.   -She is using Tylenol PRN with good relief  3. Regarding prevention meds: Tried Topamax without relief prior.   -No prevention medication needed at this time/ headaches are rare  4. Note in 2023 she suffered a headache with working out. She was found to have  perimesencephalic SAH.  Followed up with Dr Goodwin since. No aneurysm or vascular malformation found on DSA, CTA and recent vessel imaging. NO causes found. Dr Goodwin was reviewing her case with Neuroimmunology but patient has had no further appointments   Also referred to  sleep clinic by Dr Goodwin but her fatigue resolved with stopping Amlodipine/ BP is normal on losartan        RTC  Dr Goodwin and with me in 1 year

## 2024-09-30 ENCOUNTER — TELEPHONE (OUTPATIENT)
Dept: NEUROLOGY | Facility: CLINIC | Age: 59
End: 2024-09-30
Payer: COMMERCIAL

## 2024-11-21 ENCOUNTER — OFFICE VISIT (OUTPATIENT)
Dept: NEUROLOGY | Facility: CLINIC | Age: 59
End: 2024-11-21
Payer: COMMERCIAL

## 2024-11-21 VITALS
HEIGHT: 64 IN | WEIGHT: 144.19 LBS | BODY MASS INDEX: 24.62 KG/M2 | DIASTOLIC BLOOD PRESSURE: 80 MMHG | HEART RATE: 70 BPM | SYSTOLIC BLOOD PRESSURE: 149 MMHG

## 2024-11-21 DIAGNOSIS — I10 ESSENTIAL HYPERTENSION: ICD-10-CM

## 2024-11-21 DIAGNOSIS — Z86.79 HISTORY OF SUBARACHNOID HEMORRHAGE: ICD-10-CM

## 2024-11-21 DIAGNOSIS — E78.00 ELEVATED CHOLESTEROL: ICD-10-CM

## 2024-11-21 DIAGNOSIS — I60.9 NONTRAUMATIC SUBARACHNOID HEMORRHAGE: ICD-10-CM

## 2024-11-21 DIAGNOSIS — R90.82 WHITE MATTER ABNORMALITY ON MRI OF BRAIN: Primary | ICD-10-CM

## 2024-11-21 PROCEDURE — 3077F SYST BP >= 140 MM HG: CPT | Mod: CPTII,S$GLB,, | Performed by: PSYCHIATRY & NEUROLOGY

## 2024-11-21 PROCEDURE — 99215 OFFICE O/P EST HI 40 MIN: CPT | Mod: S$GLB,,, | Performed by: PSYCHIATRY & NEUROLOGY

## 2024-11-21 PROCEDURE — 3079F DIAST BP 80-89 MM HG: CPT | Mod: CPTII,S$GLB,, | Performed by: PSYCHIATRY & NEUROLOGY

## 2024-11-21 PROCEDURE — 1159F MED LIST DOCD IN RCRD: CPT | Mod: CPTII,S$GLB,, | Performed by: PSYCHIATRY & NEUROLOGY

## 2024-11-21 PROCEDURE — 3008F BODY MASS INDEX DOCD: CPT | Mod: CPTII,S$GLB,, | Performed by: PSYCHIATRY & NEUROLOGY

## 2024-11-21 PROCEDURE — 99999 PR PBB SHADOW E&M-EST. PATIENT-LVL IV: CPT | Mod: PBBFAC,,, | Performed by: PSYCHIATRY & NEUROLOGY

## 2024-11-21 PROCEDURE — 4010F ACE/ARB THERAPY RXD/TAKEN: CPT | Mod: CPTII,S$GLB,, | Performed by: PSYCHIATRY & NEUROLOGY

## 2024-11-21 NOTE — PROGRESS NOTES
Vascular Neurology  Clinic Note    Reason For Visit (Chief Complaint): SAH followup    HPI: 59 y.o. right handed woman with PMH migraine w/ aura, asthma, with recent admission for perimesencephalic SAH.  Here for followup after hospitalization.    Patient presented to [facility] ED with severe HA that developed while working out.  BP to 200 systolic on arrival to ED.  CTH showed hemorrhage along R side of katiuska.  No aneurysm seen on CTA.  NIHSS 0, HH 2.  She was transferred to [facility] for angiogram, which was negative for aneurysm and other vascular anomalies.  She continued to have HA and neck pain.  MRI showed moderate amount of white matter FLAIR hyperintensities but no explanation for hemorrhage.  She was discharged on HD 7.      She saw neurosurgery as outpatient and had repeat CTA on 1/29.  Taking tylenol 1-2x/week for headaches.  Has started doing more activities.  Fatigue improving.  Was sleeping a lot after hospital discharge.    Migraines w/ aura started in 20s.  Became worse in her 30s.  Went a way after menopause.  Got relief from DHE/Migranal.  Failed Topamax, Imitrex, ubrogepant.      Interval Hx:  Trying to lose weight to help with BP.  Currently on losartan (switched from amlodipine due to fatigue).  Reports now sleeping well since getting off amlodipine.  Occasionally waking up due to sinus congestion.  Memory pretty good, but has had some stressors.  Doing fine at work.      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     DSA 12/10/23:  Six vessel cerebral angiogram showed no significant abnormalities to account for patient's subarachnoid hemorrhage.  No aneurysm, arteriovenous malformation or AV fistula.       Cardiac Evaluation:  TTE 12/9/23:  LVEF 65%  NL LA size        Relevant Labwork:  Recent Labs   Lab 12/08/23  1403 11/06/24  0845   Hemoglobin A1C 5.4  --    LDL Cholesterol 128.4  "158 H   HDL 46 51   Triglycerides 33 137   Cholesterol 181 260 H       I independently viewed the above imaging studies in addition to reviewing the report.  I reviewed the above labwork.    Review of Systems  Msk: negative for muscle pain  Skin: negative for pruritis  Neuro: + headache  All others negative    Past Medical History  Past Medical History:   Diagnosis Date    Asthma     Hypertension     Lung collapse     Nontraumatic subarachnoid hemorrhage 12/08/2023    Pancreatitis     Pneumothorax     spontaneous     Family History  Parents and sister have LAUREN.  Mother and sister have migraines.  Mother has PSP.  Dad had NPH & CAD.  No FH strokes.    Social History  Nurse .  Critical care background RN.      Medication List with Changes/Refills   Current Medications    CHOLECALCIFEROL, VITAMIN D3, (VITAMIN D3) 50 MCG (2,000 UNIT) TAB    Take by mouth once daily.    LOSARTAN (COZAAR) 100 MG TABLET    TAKE 1 TABLET BY MOUTH ONCE DAILY.    MAGNESIUM AMINO ACID CHELATE 100 MG TAB    Take 1 tablet by mouth once daily.    NON FORMULARY MEDICATION    Bi-Est (50/50)/Prog/Test 4mg/100mg/1mg/ml Cream - APPLY 1 CLICKS TOPICALLY EVERY DAY AT BEDTIME AS DIRECTED    THEANINE 200 MG CAP    Take 200 mg by mouth once daily.   Discontinued Medications    OMEGA-3 FATTY ACIDS/FISH OIL (FISH OIL-OMEGA-3 FATTY ACIDS) 300-1,000 MG CAPSULE    Take 2 capsules by mouth once daily.       EXAM  Vital Signs:Blood pressure (!) 149/80, pulse 70, height 5' 4" (1.626 m), weight 65.4 kg (144 lb 2.9 oz).  General: well appearing without discomfort   Neck: no carotid bruits  CV: RRR, nL S1&S2  Resp: breathing comfortably, no wheezing  Ext: wwp, no pedal edema    Mental Status: Alert and oriented, normal attention, speech fluent and prosodic, naming and repetition intact, follows multistep embedded commands, no e/o neglect or extinction  Cranial Nerves: PERRL, EOMI, VFF, sensation intact, face symmetric, TUP midline, SCM/trap 5/5  Motor: Normal " bulk and tone, no drift, finger taps symmetric  Strength 5/5 throughout  Sensory: intact light touch bilaterally, intact proprioception bilaterally  Coordination: no ataxia on finger-to-nose or heel-to-shin testing; no truncal ataxia  Gait & Stance: normal  DTR: 2+ symmetric        ___________________  ASSESSMENT & PLAN  58 y.o. right handed woman with PMH migraine w/ aura, asthma, with recent admission for perimesencephalic SAH.  Doing well with improved HA and would like to return to work next week.  No aneurysm or vascular malformation found on DSA, CTA and recent vessel imaging.  [employer] [employer] of white matter disease appears excessive for migraines, and no significant vascular RF (mild HTN - recently started on low dose amlodipine).  Will refer to Neuro Immunology and send CADASIL Notch3 testing.    - Repeat CTA head/neck  - Notch3 genetic testing  - Referral to Neuro Immunology for white matter lesions  - Aggressive BP blood pressure control.  Goal <130/80  - STOP fish oil supplement (LDL ~150)  - Tylenol PRN for HA  - Avoid estrogen replacement if possible  - RTC annually      Problem List Items Addressed This Visit          Unprioritized    Essential hypertension    Elevated cholesterol    History of subarachnoid hemorrhage    RESOLVED: Nontraumatic subarachnoid hemorrhage    Relevant Orders    CTA Head and Neck (xpd)     Other Visit Diagnoses       White matter abnormality on MRI of brain    -  Primary    Relevant Orders    Ambulatory referral/consult to Neurology    NOTCH3 (CADASIL) DNA Sequencing            Carol Goodwin MD  Vascular Neurology

## 2024-11-21 NOTE — PATIENT INSTRUCTIONS
"- CT angiogram head/neck  - Blood work for "CADASIL"   - Stop fish oil supplements  - Referral to Neuro Immunology   - Continue to work on blood pressure.  Goal <130/80.      Mediterranean Diet Recommendations    Eat primarily plant-based foods, such as fruits and vegetables, whole grains, legumes (beans) and nuts.  Limit refined carbohydrates (white pasta, bread, rice).  Replace butter with healthy fats such as olive oil.  Use herbs and spices instead of salt to flavor foods.  Limit red meat and processed meats to no more than a few times a month.  Avoid sugary sodas, bakery goods, and sweets.  Eat fish and poultry at least twice a week.  Get plenty of exercise (150 minutes per week).    Adopted from Marina trevizo al, NEJ, 2018.      "

## 2025-02-05 DIAGNOSIS — R90.82 WHITE MATTER ABNORMALITY ON MRI OF BRAIN: Primary | ICD-10-CM

## 2025-02-24 ENCOUNTER — TELEPHONE (OUTPATIENT)
Dept: NEUROLOGY | Facility: CLINIC | Age: 60
End: 2025-02-24
Payer: COMMERCIAL

## 2025-03-06 ENCOUNTER — PATIENT MESSAGE (OUTPATIENT)
Dept: NEUROLOGY | Facility: CLINIC | Age: 60
End: 2025-03-06
Payer: COMMERCIAL

## 2025-03-17 ENCOUNTER — OFFICE VISIT (OUTPATIENT)
Dept: NEUROLOGY | Facility: CLINIC | Age: 60
End: 2025-03-17
Payer: COMMERCIAL

## 2025-03-17 VITALS
DIASTOLIC BLOOD PRESSURE: 92 MMHG | HEIGHT: 64 IN | SYSTOLIC BLOOD PRESSURE: 167 MMHG | WEIGHT: 142.75 LBS | BODY MASS INDEX: 24.37 KG/M2 | HEART RATE: 77 BPM

## 2025-03-17 DIAGNOSIS — R90.82 WHITE MATTER ABNORMALITY ON MRI OF BRAIN: ICD-10-CM

## 2025-03-17 PROCEDURE — 99215 OFFICE O/P EST HI 40 MIN: CPT | Mod: S$GLB,,, | Performed by: STUDENT IN AN ORGANIZED HEALTH CARE EDUCATION/TRAINING PROGRAM

## 2025-03-17 PROCEDURE — 1160F RVW MEDS BY RX/DR IN RCRD: CPT | Mod: CPTII,S$GLB,, | Performed by: STUDENT IN AN ORGANIZED HEALTH CARE EDUCATION/TRAINING PROGRAM

## 2025-03-17 PROCEDURE — 99999 PR PBB SHADOW E&M-EST. PATIENT-LVL IV: CPT | Mod: PBBFAC,,, | Performed by: STUDENT IN AN ORGANIZED HEALTH CARE EDUCATION/TRAINING PROGRAM

## 2025-03-17 PROCEDURE — 1159F MED LIST DOCD IN RCRD: CPT | Mod: CPTII,S$GLB,, | Performed by: STUDENT IN AN ORGANIZED HEALTH CARE EDUCATION/TRAINING PROGRAM

## 2025-03-17 PROCEDURE — 3008F BODY MASS INDEX DOCD: CPT | Mod: CPTII,S$GLB,, | Performed by: STUDENT IN AN ORGANIZED HEALTH CARE EDUCATION/TRAINING PROGRAM

## 2025-03-17 PROCEDURE — 3077F SYST BP >= 140 MM HG: CPT | Mod: CPTII,S$GLB,, | Performed by: STUDENT IN AN ORGANIZED HEALTH CARE EDUCATION/TRAINING PROGRAM

## 2025-03-17 PROCEDURE — 4010F ACE/ARB THERAPY RXD/TAKEN: CPT | Mod: CPTII,S$GLB,, | Performed by: STUDENT IN AN ORGANIZED HEALTH CARE EDUCATION/TRAINING PROGRAM

## 2025-03-17 PROCEDURE — 3080F DIAST BP >= 90 MM HG: CPT | Mod: CPTII,S$GLB,, | Performed by: STUDENT IN AN ORGANIZED HEALTH CARE EDUCATION/TRAINING PROGRAM

## 2025-03-17 NOTE — PROGRESS NOTES
"[facility]  New Patient Visit      History:     Previous records (physician notes, laboratory reports, and radiology reports) and imaging studies were reviewed and summarized. My recommendations will be communicated back to the patient's physician(s) by EMR/mail    She recently had a subarachnoid hemorrhage  and has followed up with stroke Neurology.  No aneurysm or vascular malformation were found.  Given her excessive burden of white matter disease that seem out of proportion to her comorbidities and cerebrovascular risk factors, she was referred to [facility] for further workup.  Notched 3 testing was also planned.     She denies ever having any symptoms that are consistent with a relapse.   She has frequent migraines for the past 2 weeks. She used to take magnesium nightly. She is now on Losartan for HTN and migraine prophylaxis.     She works out frequently (4x/week).     LILIA, RF, CRP normal.     She denies family history of cerebrovascular issues.    Her mother passed away from PSP related complicated in her late 70s. Her father passed away from hydrocephaly in his 80s.     She had MRI in 1990s and was told that she had "white spots" but she does not think they were as big or numerous as the scan she had in 12/2023.     She used to work as a neuro ICU nurse    ROS:     A complete 9 system ROS was reviewed by me and otherwise negative .     Past Medical History:   Diagnosis Date    Asthma     Hypertension     Lung collapse     Nontraumatic subarachnoid hemorrhage 12/08/2023    Pancreatitis     Pneumothorax     spontaneous       Past Surgical History:   Procedure Laterality Date    APPENDECTOMY      bilateral groin hernia repair      CEREBRAL ANGIOGRAM N/A 12/10/2023    Procedure: ANGIOGRAM-CEREBRAL; Need Anesthesia; Dr. Byron Iniguez;  Surgeon: Dina Mcnulty;  Location: [facility];  Service: Anesthesiology;  Laterality: N/A;  Need Anesthesia    COLONOSCOPY N/A 7/2/2024    Procedure: " COLONOSCOPY;  Surgeon: Manny Rojas MD;  Location: [facility];  Service: Endoscopy;  Laterality: N/A;       Family History   Problem Relation Name Age of Onset    Cataracts Mother      Diabetes Mother      Hypertension Mother      Progressive Supranuclear Palsy Mother      Stroke Mother      Pulmonary embolism Mother      Cataracts Father      Heart disease Father      Hypertension Father      Asthma Father      Diabetes Father      Bipolar disorder Sister 2     Chronic fatigue Sister 2     Hyperlipidemia Sister 2        Social History     Socioeconomic History    Marital status:     Number of children: 2   Occupational History     Employer: [employer]   Tobacco Use    Smoking status: Never     Passive exposure: Never    Smokeless tobacco: Never   Substance and Sexual Activity    Alcohol use: No    Drug use: No    Sexual activity: Yes     Partners: Male     Social Drivers of Health     Financial Resource Strain: Low Risk  (3/16/2025)    Overall Financial Resource Strain (CARDIA)     Difficulty of Paying Living Expenses: Not hard at all   Food Insecurity: No Food Insecurity (3/16/2025)    Hunger Vital Sign     Worried About Running Out of Food in the Last Year: Never true     Ran Out of Food in the Last Year: Never true   Transportation Needs: No Transportation Needs (3/16/2025)    PRAPARE - Transportation     Lack of Transportation (Medical): No     Lack of Transportation (Non-Medical): No   Physical Activity: Sufficiently Active (3/16/2025)    Exercise Vital Sign     Days of Exercise per Week: 4 days     Minutes of Exercise per Session: 60 min   Stress: Stress Concern Present (3/16/2025)    [employer] - Occupational Stress Questionnaire     Feeling of Stress : To some extent   Housing Stability: Low Risk  (3/16/2025)    Housing Stability Vital Sign     Unable to Pay for Housing in the Last Year: No     Number of Times Moved in the Last Year: 0     Homeless in the  "Last Year: No       Review of patient's allergies indicates:   Allergen Reactions    Amlodipine      GEN. PAIN    Erythromycin     Pcn [penicillins]        Patient Employment       Status   Full Time    Employer   [employer]    Address   [address],[place] [address]               Exam:     Vitals:    03/17/25 1020   BP: (!) 167/92   BP Location: Left arm   Patient Position: Sitting   Pulse: 77   Weight: 64.8 kg (142 lb 12 oz)   Height: 5' 4" (1.626 m)          In general, the patient is well nourished and appears to be in no acute distress.    MENTAL STATUS: language is fluent, normal verbal comprehension, short-term and remote memory is intact, attention is normal, patient is alert, fund of knowlege is appropriate by vocabulary. Behavior and judgment appropriate with full medical decision making capacity.     CRANIAL NERVE EXAM:  There is no intrernuclear ophthalmoplegia.  Extraocular muscles are intact. Pupils are equal, round, and reactive to light. No facial asymmetry. Facial sensation is intact bilaterally. There is no dysarthria. Uvula is midline, and palate moves symmetrically. Shoulder shrug intact bilaterlly. Tongue protrusion is midline. Hearing is intact to finger rub bilaterally. Neck is supple with full ROM    MOTOR EXAM: Normal bulk and tone throughout UE and LE bilaterally.   No pronator drift; rapid sequential movements are normal; Strength is  5/5 in all groups in the lower extremities and upper extremities    REFLEXES: 2+ and symmetric throughout in all four extremeties;no cross-adductors    SENSORY EXAM: Normal to light touch, vibration throughout.    COORDINATION: Normal finger-to-nose exam. Normal heel-to-shin exam.    GAIT: Narrow based and stable. Able to heel walk, toe walk, and perform tandem gait.     Negative Romberg's    Imaging (personally reviewed):   MRI Bran 12/17/23          Labs:     Lab Results   Component Value Date    QNMZDLMO81FQ 55.5 06/01/2022    " "LFSVKGKU17VY 30.7 10/11/2018     No results found for: "JCVINDEX", "JCVANTIBODY"  No results found for: "SI7CNINH", "ABSOLUTECD3", "LG7WIXAT", "ABSOLUTECD8", "KU2DEFXG", "ABSOLUTECD4", "LABCD48"  Lab Results   Component Value Date    WBC 4.80 11/06/2024    RBC 4.58 11/06/2024    HGB 14.0 11/06/2024    HCT 41.5 11/06/2024    MCV 91 11/06/2024    MCH 30.6 11/06/2024    MCHC 33.7 11/06/2024    RDW 13.2 11/06/2024     11/06/2024    MPV 6.9 (L) 11/06/2024    GRAN 2.6 11/06/2024    GRAN 53.6 11/06/2024    LYMPH 1.6 11/06/2024    LYMPH 34.0 11/06/2024    MONO 0.4 11/06/2024    MONO 7.6 11/06/2024    EOS 0.2 11/06/2024    BASO 0.10 11/06/2024    EOSINOPHIL 3.4 11/06/2024    BASOPHIL 1.4 11/06/2024     Sodium   Date Value Ref Range Status   11/06/2024 140 136 - 145 mmol/L Final     Potassium   Date Value Ref Range Status   11/06/2024 4.9 3.5 - 5.1 mmol/L Final     Chloride   Date Value Ref Range Status   11/06/2024 106 95 - 110 mmol/L Final     CO2   Date Value Ref Range Status   11/06/2024 28 23 - 29 mmol/L Final     Glucose   Date Value Ref Range Status   11/06/2024 103 74 - 106 mg/dL Final     BUN   Date Value Ref Range Status   11/06/2024 21 (H) 7 - 17 mg/dL Final     Creatinine   Date Value Ref Range Status   11/06/2024 0.92 0.70 - 1.20 mg/dL Final     Calcium   Date Value Ref Range Status   11/06/2024 9.4 8.4 - 10.2 mg/dL Final     Total Protein   Date Value Ref Range Status   11/06/2024 7.8 6.3 - 8.2 g/dL Final     Albumin   Date Value Ref Range Status   11/06/2024 4.3 3.5 - 5.2 g/dL Final     Total Bilirubin   Date Value Ref Range Status   11/06/2024 0.8 0.2 - 1.3 mg/dL Final     Alkaline Phosphatase   Date Value Ref Range Status   11/06/2024 85 38 - 145 U/L Final     AST   Date Value Ref Range Status   11/06/2024 27 14 - 36 U/L Final     ALT   Date Value Ref Range Status   11/06/2024 21 10 - 44 U/L Final     Anion Gap   Date Value Ref Range Status   11/06/2024 6 (L) 8 - 16 mmol/L Final     eGFR if African " American   Date Value Ref Range Status   06/01/2022 >60 >60 mL/min/1.73 m^2 Final     eGFR if non    Date Value Ref Range Status   06/01/2022 >60 >60 mL/min/1.73 m^2 Final     Comment:     Calculation used to obtain the estimated glomerular filtration  rate (eGFR) is the CKD-EPI equation.                   Diagnosis/Assessment/Plan:     MRI reviewed: no characteristic demyelinating lesions but she does have a microvascular disease burden more than expected for age. No follow up MRI to evaluate interval change. Will obtain updated demyelinating protocol and reassess. She does not have any clinical symptom history suspicious for relapse.  Follow up after MRI                Total time spent with the patient: 42 minutes, including face to face consultation, chart review and coordination of care, on the day of the visit. This includes face to face time and non-face to face time preparing to see the patient (eg, review of tests), obtaining and/or reviewing separately obtained history, documenting clinical information in the electronic or other health record, independently interpreting resultsand communicating results to the patient/family/caregiver, or care coordination.     Marissa Merrill MD, MSc  Attending neurologist

## 2025-03-25 ENCOUNTER — TELEPHONE (OUTPATIENT)
Dept: NEUROLOGY | Facility: CLINIC | Age: 60
End: 2025-03-25
Payer: COMMERCIAL

## 2025-03-25 ENCOUNTER — PATIENT MESSAGE (OUTPATIENT)
Dept: NEUROLOGY | Facility: CLINIC | Age: 60
End: 2025-03-25
Payer: COMMERCIAL

## 2025-03-25 NOTE — TELEPHONE ENCOUNTER
Spoke with pt and stated that the provider is not in today to do the orders but will be in tomorrow and i can fax them tomorrow to have them done.

## 2025-03-26 DIAGNOSIS — I10 ESSENTIAL HYPERTENSION: Primary | ICD-10-CM

## 2025-04-01 ENCOUNTER — RESULTS FOLLOW-UP (OUTPATIENT)
Dept: NEUROLOGY | Facility: HOSPITAL | Age: 60
End: 2025-04-01

## 2025-04-17 ENCOUNTER — PATIENT MESSAGE (OUTPATIENT)
Dept: NEUROLOGY | Facility: CLINIC | Age: 60
End: 2025-04-17
Payer: COMMERCIAL